# Patient Record
Sex: FEMALE | Race: WHITE | NOT HISPANIC OR LATINO | Employment: FULL TIME | URBAN - METROPOLITAN AREA
[De-identification: names, ages, dates, MRNs, and addresses within clinical notes are randomized per-mention and may not be internally consistent; named-entity substitution may affect disease eponyms.]

---

## 2018-04-13 ENCOUNTER — OFFICE VISIT (OUTPATIENT)
Dept: URGENT CARE | Facility: CLINIC | Age: 38
End: 2018-04-13
Payer: COMMERCIAL

## 2018-04-13 VITALS
BODY MASS INDEX: 21.34 KG/M2 | OXYGEN SATURATION: 97 % | HEIGHT: 64 IN | TEMPERATURE: 98 F | SYSTOLIC BLOOD PRESSURE: 133 MMHG | WEIGHT: 125 LBS | HEART RATE: 77 BPM | RESPIRATION RATE: 18 BRPM | DIASTOLIC BLOOD PRESSURE: 92 MMHG

## 2018-04-13 DIAGNOSIS — R39.15 URINARY URGENCY: Primary | ICD-10-CM

## 2018-04-13 DIAGNOSIS — R35.0 URINARY FREQUENCY: ICD-10-CM

## 2018-04-13 DIAGNOSIS — R30.0 DYSURIA: ICD-10-CM

## 2018-04-13 LAB
B-HCG UR QL: NEGATIVE
BILIRUB UR QL STRIP: NEGATIVE
CTP QC/QA: YES
GLUCOSE UR QL STRIP: NEGATIVE
KETONES UR QL STRIP: NEGATIVE
LEUKOCYTE ESTERASE UR QL STRIP: NEGATIVE
PH, POC UA: 6 (ref 5–8)
POC BLOOD, URINE: NEGATIVE
POC NITRATES, URINE: NEGATIVE
PROT UR QL STRIP: NEGATIVE
SP GR UR STRIP: 1.02 (ref 1–1.03)
UROBILINOGEN UR STRIP-ACNC: NORMAL (ref 0.1–1.1)

## 2018-04-13 PROCEDURE — 99203 OFFICE O/P NEW LOW 30 MIN: CPT | Mod: 25,S$GLB,, | Performed by: NURSE PRACTITIONER

## 2018-04-13 PROCEDURE — 81003 URINALYSIS AUTO W/O SCOPE: CPT | Mod: QW,S$GLB,, | Performed by: NURSE PRACTITIONER

## 2018-04-13 PROCEDURE — 81025 URINE PREGNANCY TEST: CPT | Mod: S$GLB,,, | Performed by: NURSE PRACTITIONER

## 2018-04-13 RX ORDER — LEVONORGESTREL AND ETHINYL ESTRADIOL 90; 20 UG/1; UG/1
TABLET ORAL
Refills: 12 | COMMUNITY
Start: 2018-03-29 | End: 2020-07-20

## 2018-04-13 RX ORDER — PHENAZOPYRIDINE HYDROCHLORIDE 200 MG/1
200 TABLET, FILM COATED ORAL 3 TIMES DAILY PRN
Qty: 6 TABLET | Refills: 0 | Status: SHIPPED | OUTPATIENT
Start: 2018-04-13 | End: 2018-04-15

## 2018-04-13 NOTE — PROGRESS NOTES
"Subjective:       Patient ID: Lauren Schmid is a 37 y.o. female.    Vitals:  height is 5' 4" (1.626 m) and weight is 56.7 kg (125 lb). Her oral temperature is 97.6 °F (36.4 °C). Her blood pressure is 133/92 (abnormal) and her pulse is 77. Her respiration is 18 and oxygen saturation is 97%.     Chief Complaint: Urinary Tract Infection    Patient presents with intermittent dysuria, urinary frequency, and urinary urgency x 6 weeks.  She states two days ago she started taking Cystex with some relief of symptoms.  She states that she gets somewhat recurrent UTIs and at one point in time was given a 90 day supply of Macrobid.  She states that she had a full panel of STD testing a month ago.  She is sexually active with continuous birth control and no condom use with one partner and is not concerned with STDs at this time.  She denies vaginal discharge.        Dysuria    This is a new problem. The current episode started more than 1 month ago. The problem occurs intermittently. The problem has been unchanged. The quality of the pain is described as burning. The patient is experiencing no pain. There has been no fever. The fever has been present for less than 1 day. She is sexually active. There is no history of pyelonephritis. Associated symptoms include frequency and urgency. Pertinent negatives include no behavior changes, chills, discharge, flank pain, hematuria, hesitancy, nausea, possible pregnancy, vomiting, bubble bath use, constipation or rash. Treatments tried: Cystex. The treatment provided moderate relief. Her past medical history is significant for recurrent UTIs. There is no history of catheterization, genitourinary reflux, hypertension, kidney stones, a single kidney, STD or a urological procedure.     Review of Systems   Constitution: Negative for chills, decreased appetite, fever, malaise/fatigue and night sweats.   Skin: Negative for itching, rash and suspicious lesions.   Musculoskeletal: Negative for " back pain.   Gastrointestinal: Negative for abdominal pain, constipation, nausea and vomiting.   Genitourinary: Positive for dysuria, frequency and urgency. Negative for flank pain, genital sores, hematuria, hesitancy, missed menses, non-menstrual bleeding and pelvic pain.       Objective:      Physical Exam   Constitutional: She is oriented to person, place, and time. She appears well-developed and well-nourished.   HENT:   Head: Normocephalic and atraumatic.   Right Ear: External ear normal.   Left Ear: External ear normal.   Nose: Nose normal. No nasal deformity. No epistaxis.   Mouth/Throat: Oropharynx is clear and moist and mucous membranes are normal.   Eyes: Conjunctivae and lids are normal.   Neck: Trachea normal, normal range of motion and phonation normal. Neck supple.   Cardiovascular: Normal rate, regular rhythm, normal heart sounds and normal pulses.    Pulmonary/Chest: Effort normal and breath sounds normal.   Abdominal: Soft. Normal appearance and bowel sounds are normal. She exhibits no distension and no mass. There is no tenderness. There is no rigidity, no rebound, no guarding and no CVA tenderness.   Neurological: She is alert and oriented to person, place, and time.   Skin: Skin is warm, dry and intact.   Psychiatric: She has a normal mood and affect. Her speech is normal and behavior is normal. Cognition and memory are normal.   Nursing note and vitals reviewed.      Results for orders placed or performed in visit on 04/13/18   POCT Urinalysis, Dipstick, Automated, W/O Scope   Result Value Ref Range    POC Blood, Urine Negative Negative    POC Bilirubin, Urine Negative Negative    POC Urobilinogen, Urine normal 0.1 - 1.1    POC Ketones, Urine Negative Negative    POC Protein, Urine Negative Negative    POC Nitrates, Urine Negative Negative    POC Glucose, Urine Negative Negative    pH, UA 6.0 5 - 8    POC Specific Gravity, Urine 1.020 1.003 - 1.029    POC Leukocytes, Urine Negative Negative    POCT urine pregnancy   Result Value Ref Range    POC Preg Test, Ur Negative Negative     Acceptable Yes      Assessment:       1. Urinary urgency    2. Urinary frequency    3. Dysuria        Plan:         Urinary urgency  -     POCT Urinalysis, Dipstick, Automated, W/O Scope  -     POCT urine pregnancy  -     Ambulatory referral to Urology  -     C. trachomatis/N. gonorrhoeae by AMP DNA Urine  -     Urine culture    Urinary frequency  -     Ambulatory referral to Urology  -     C. trachomatis/N. gonorrhoeae by AMP DNA Urine  -     Urine culture    Dysuria  -     Ambulatory referral to Urology  -     phenazopyridine (PYRIDIUM) 200 MG tablet; Take 1 tablet (200 mg total) by mouth 3 (three) times daily as needed for Pain.  Dispense: 6 tablet; Refill: 0  -     C. trachomatis/N. gonorrhoeae by AMP DNA Urine  -     Urine culture      Patient Instructions   Call [phone] to confirm appointment for follow up with urology.  Stop Cystex.  Start Pyridium as needed.  Increase water intake.  Stop use of caffeine, alcohol, or spicy foods as they can irritate your bladder.  We will call you with results of your cultures and call you in an antibiotic at that time if needed.        Dysuria with Uncertain Cause (Adult)    The urethra is the tube that allows urine to pass out of the body. In a woman, the urethra is the opening above the vagina. In men, the urethra is the opening on the tip of the penis. Dysuria is the feeling of pain or burning in the urethra when passing urine.  Dysuria can be caused by anything that irritates or inflames the urethra. An infection or chemical irritation can cause this reaction. A bladder infection is the most common cause of dysuria in adults. A urine test can diagnose this. A bladder infection needs antibiotic treatment.  Soaps, lotions, colognes and feminine hygiene products can cause dysuria. So can birth control jellies, creams, and foams. It will go away 1 to 3 days after using  these irritants.  Sexually transmitted diseases (STDs) such as chlamydia or gonorrhea can cause dysuria. Your healthcare provider may take a culture sample. Your provider may start you on antibiotic medicine before the culture test returns.  In women who have gone through menopause, dysuria can be from dryness in the lining of the urethra. This can be treated with hormones. Dysuria becomes long-term (chronic) when it lasts for weeks or months. You may need to see a specialist (urologist) to diagnose and treat chronic dysuria.  Home care  These home care tips may help:  · Don't use any chemicals or products that you think may be causing your symptoms.  · If you were given a prescription medicine, take as directed. Be sure to take it until it is all used up.  · If a culture was taken, don't have sex until you have been told that it is negative. This means you don't have an infection. Then follow your healthcare provider's advice to treat your condition.  If a culture was done and it is positive:  · Both you and your sexual partner may need to be treated. This is true even if your partner has no symptoms.  · Contact your healthcare provider or go to an urgent care clinic or the public health department to be looked at and treated.  · Don't have sex until both you and your partner(s) have finished all antibiotics and your healthcare provider says you are no longer contagious.  · Learn about and use safe sex practices. The safest sex is with a partner who has tested negative and only has sex with you. Condoms can prevent STDs from spreading, but they aren't a guarantee.  Follow-up care  Follow up with your healthcare provider, or as advised. If a culture was taken, you may call as directed for the results. If you have an STD, follow up with your provider or the public health department for a complete STD screening, including HIV testing. For more information, contact CDC-INFO at 976-099-7471.  When to seek medical  advice  Call your healthcare provider right away if any of these occur:  · You aren't better after 3 days of treatment  · Fever of 100.4ºF (38ºC) or higher, or as directed by your healthcare provider  · Back or belly pain that gets worse  · You can't urinate because of pain  · New discharge from the urethra, vagina, or penis  · Painful sores on the penis  · Rash or joint pain  · Painful lumps (lymph nodes) in the groin  · Testicle pain or swelling of the scrotum  Date Last Reviewed: 11/1/2016 © 2000-2017 [employer]. [address] [place], [place] [address]. All rights reserved. This information is not intended as a substitute for professional medical care. Always follow your healthcare professional's instructions.

## 2018-04-13 NOTE — PATIENT INSTRUCTIONS
Call [phone] to confirm appointment for follow up with urology.  Stop Cystex.  Start Pyridium as needed.  Increase water intake.  Stop use of caffeine, alcohol, or spicy foods as they can irritate your bladder.  We will call you with results of your cultures and call you in an antibiotic at that time if needed.        Dysuria with Uncertain Cause (Adult)    The urethra is the tube that allows urine to pass out of the body. In a woman, the urethra is the opening above the vagina. In men, the urethra is the opening on the tip of the penis. Dysuria is the feeling of pain or burning in the urethra when passing urine.  Dysuria can be caused by anything that irritates or inflames the urethra. An infection or chemical irritation can cause this reaction. A bladder infection is the most common cause of dysuria in adults. A urine test can diagnose this. A bladder infection needs antibiotic treatment.  Soaps, lotions, colognes and feminine hygiene products can cause dysuria. So can birth control jellies, creams, and foams. It will go away 1 to 3 days after using these irritants.  Sexually transmitted diseases (STDs) such as chlamydia or gonorrhea can cause dysuria. Your healthcare provider may take a culture sample. Your provider may start you on antibiotic medicine before the culture test returns.  In women who have gone through menopause, dysuria can be from dryness in the lining of the urethra. This can be treated with hormones. Dysuria becomes long-term (chronic) when it lasts for weeks or months. You may need to see a specialist (urologist) to diagnose and treat chronic dysuria.  Home care  These home care tips may help:  · Don't use any chemicals or products that you think may be causing your symptoms.  · If you were given a prescription medicine, take as directed. Be sure to take it until it is all used up.  · If a culture was taken, don't have sex until you have been told that it is negative. This means you don't have  an infection. Then follow your healthcare provider's advice to treat your condition.  If a culture was done and it is positive:  · Both you and your sexual partner may need to be treated. This is true even if your partner has no symptoms.  · Contact your healthcare provider or go to an urgent care clinic or the public health department to be looked at and treated.  · Don't have sex until both you and your partner(s) have finished all antibiotics and your healthcare provider says you are no longer contagious.  · Learn about and use safe sex practices. The safest sex is with a partner who has tested negative and only has sex with you. Condoms can prevent STDs from spreading, but they aren't a guarantee.  Follow-up care  Follow up with your healthcare provider, or as advised. If a culture was taken, you may call as directed for the results. If you have an STD, follow up with your provider or the public health department for a complete STD screening, including HIV testing. For more information, contact CDC-INFO at 872-459-8246.  When to seek medical advice  Call your healthcare provider right away if any of these occur:  · You aren't better after 3 days of treatment  · Fever of 100.4ºF (38ºC) or higher, or as directed by your healthcare provider  · Back or belly pain that gets worse  · You can't urinate because of pain  · New discharge from the urethra, vagina, or penis  · Painful sores on the penis  · Rash or joint pain  · Painful lumps (lymph nodes) in the groin  · Testicle pain or swelling of the scrotum  Date Last Reviewed: 11/1/2016  © 6359-5620 [employer]. [address] [place] [address], [place] [address]. All rights reserved. This information is not intended as a substitute for professional medical care. Always follow your healthcare professional's instructions.

## 2018-04-17 LAB
BACTERIA UR CULT: NORMAL
BACTERIA UR CULT: NORMAL

## 2018-04-18 ENCOUNTER — TELEPHONE (OUTPATIENT)
Dept: INTERNAL MEDICINE | Facility: CLINIC | Age: 38
End: 2018-04-18

## 2018-04-18 ENCOUNTER — TELEPHONE (OUTPATIENT)
Dept: URGENT CARE | Facility: CLINIC | Age: 38
End: 2018-04-18

## 2018-04-18 LAB
C TRACH RRNA SPEC QL NAA+PROBE: NEGATIVE
N GONORRHOEA RRNA SPEC QL NAA+PROBE: NEGATIVE

## 2018-04-18 NOTE — TELEPHONE ENCOUNTER
----- Message from Jenn Powell MD sent at 4/18/2018  9:30 AM CDT -----  Notify patient that all lab results are normal. No STD. Followup with primary care doctor as needed

## 2018-04-23 ENCOUNTER — OFFICE VISIT (OUTPATIENT)
Dept: UROLOGY | Facility: CLINIC | Age: 38
End: 2018-04-23
Payer: COMMERCIAL

## 2018-04-23 VITALS
DIASTOLIC BLOOD PRESSURE: 86 MMHG | WEIGHT: 119.69 LBS | BODY MASS INDEX: 20.43 KG/M2 | HEIGHT: 64 IN | HEART RATE: 92 BPM | SYSTOLIC BLOOD PRESSURE: 128 MMHG

## 2018-04-23 DIAGNOSIS — R30.0 DYSURIA: Primary | ICD-10-CM

## 2018-04-23 PROCEDURE — 99203 OFFICE O/P NEW LOW 30 MIN: CPT | Mod: S$GLB,,, | Performed by: UROLOGY

## 2018-04-23 PROCEDURE — 99999 PR PBB SHADOW E&M-EST. PATIENT-LVL III: CPT | Mod: PBBFAC,,, | Performed by: UROLOGY

## 2018-04-23 NOTE — PROGRESS NOTES
Subjective:       Patient ID: Lauren Schmid is a 37 y.o. female.    Chief Complaint: new patient (c/o burning with urination . she feel pressure like she have to urinate all the times for the pass 6weeks.)    JASONnt is here with several week history of dysuria.  She's had negative cultures done.  She's had UTIs in the past.  She was on antibiotics 1 time in the past.  No history of fever chills nausea vomiting flank pain history of stones.  She's never had a workup.  Her urinalysis is clear    History reviewed. No pertinent past medical history.    History reviewed. No pertinent surgical history.    History reviewed. No pertinent family history.    Social History     Social History    Marital status:      Spouse name: N/A    Number of children: N/A    Years of education: N/A     Occupational History    Not on file.     Social History Main Topics    Smoking status: Never Smoker    Smokeless tobacco: Never Used    Alcohol use Yes    Drug use: No    Sexual activity: Yes     Partners: Male     Other Topics Concern    Not on file     Social History Narrative    No narrative on file       Allergies:  Codeine    Medications:    Current Outpatient Prescriptions:     ROSALES 90-20 mcg per tablet, TK 1 T PO QD, Disp: , Rfl: 12    Review of Systems   Constitutional: Negative.    HENT: Negative.    Eyes: Negative.    Respiratory: Negative.    Endocrine: Negative.    Genitourinary: Negative.    Musculoskeletal: Negative.    Allergic/Immunologic: Negative.    Neurological: Negative.    Hematological: Negative.    Psychiatric/Behavioral: Negative.        Objective:      Physical Exam   Constitutional: She appears well-developed.   HENT:   Head: Normocephalic.   Cardiovascular: Normal rate.    Pulmonary/Chest: Effort normal.   Abdominal: Soft.   Musculoskeletal: Normal range of motion.   Neurological: She is alert.   Skin: Skin is warm.         Assessment:       1. Dysuria        Plan:       Lauren was seen  today for new patient.    Diagnoses and all orders for this visit:    Dysuria  -     US Retroperitoneal Complete (Kidney and; Future  -     Cystoscopy; Future     patient will take probiotics and drink plenty of fluids.  She will continue taking Cystex.  This is helping somewhat

## 2018-04-23 NOTE — LETTER
April 23, 2018      Sharon Morrow, NP  [address] [place] [address]           [facility] - Urology 4th Floor  [address]  [place] [address]  Phone: [phone]          Patient: Lauren Schmid   MR Number: [id number]   YOB: 1980   Date of Visit: 4/23/2018       Dear Sharon Morrow:    Thank you for referring Lauren Schmid to me for evaluation. Attached you will find relevant portions of my assessment and plan of care.    If you have questions, please do not hesitate to call me. I look forward to following Lauren Schmid along with you.    Sincerely,    Sung Salazar Jr., MD    Enclosure  CC:  No Recipients    If you would like to receive this communication electronically, please contact [email or web address] or [phone] to request more information on [employer] Link access.    For providers and/or their staff who would like to refer a patient to [facility], please contact us through our one-stop-shop provider referral line, [facility], at [phone].    If you feel you have received this communication in error or would no longer like to receive these types of communications, please e-mail [email or web address]

## 2018-06-10 RX ORDER — CIPROFLOXACIN 500 MG/1
500 TABLET ORAL ONCE
Status: CANCELLED | OUTPATIENT
Start: 2018-06-10 | End: 2018-06-10

## 2018-06-11 ENCOUNTER — HOSPITAL ENCOUNTER (OUTPATIENT)
Dept: UROLOGY | Facility: HOSPITAL | Age: 38
Discharge: HOME | End: 2018-06-11
Attending: UROLOGY
Payer: COMMERCIAL

## 2018-06-11 ENCOUNTER — HOSPITAL ENCOUNTER (OUTPATIENT)
Dept: RADIOLOGY | Facility: HOSPITAL | Age: 38
Discharge: HOME | End: 2018-06-11
Attending: UROLOGY
Payer: COMMERCIAL

## 2018-06-11 VITALS
WEIGHT: 120.56 LBS | HEIGHT: 64 IN | RESPIRATION RATE: 18 BRPM | HEART RATE: 85 BPM | BODY MASS INDEX: 20.58 KG/M2 | DIASTOLIC BLOOD PRESSURE: 92 MMHG | SYSTOLIC BLOOD PRESSURE: 145 MMHG | TEMPERATURE: 99 F

## 2018-06-11 DIAGNOSIS — R30.0 DYSURIA: ICD-10-CM

## 2018-06-11 PROCEDURE — 52000 CYSTOURETHROSCOPY: CPT

## 2018-06-11 PROCEDURE — 52000 CYSTOURETHROSCOPY: CPT | Mod: ,,, | Performed by: UROLOGY

## 2018-06-11 PROCEDURE — 76770 US EXAM ABDO BACK WALL COMP: CPT | Mod: 26,,, | Performed by: RADIOLOGY

## 2018-06-11 PROCEDURE — 76770 US EXAM ABDO BACK WALL COMP: CPT | Mod: TC

## 2018-06-11 RX ORDER — LIDOCAINE HYDROCHLORIDE 20 MG/ML
JELLY TOPICAL ONCE
Status: COMPLETED | OUTPATIENT
Start: 2018-06-11 | End: 2018-06-11

## 2018-06-11 RX ADMIN — LIDOCAINE HYDROCHLORIDE: 20 JELLY TOPICAL at 01:06

## 2018-06-11 NOTE — PATIENT INSTRUCTIONS
What to Expect After a Cystoscopy  For the next 24-48 hours, you may feel a mild burning when you urinate. This burning is normal and expected. Drink plenty of water to dilute the urine to help relieve the burning sensation. You may also see a small amount of blood in your urine after the procedure.    Unless you are already taking antibiotics, you may be given an antibiotic after the test to prevent infection.    Signs and Symptoms to Report  Call the [facility] at [phone] if you develop any of the following:  · Fever of 101 degrees or higher  · Chills or persistent bleeding  · Inability to urinate

## 2018-06-11 NOTE — OP NOTE
DATE OF PROCEDURE:  06/11/2018.    PREOPERATIVE DIAGNOSIS:  Dysuria.    POSTOPERATIVE DIAGNOSIS:  Chronic cystitis.    OPERATION PERFORMED:  Flexible cystoscopy.    PROCEDURE IN DETAIL:  The patient was having lower tract irritative symptoms   with urgency.  Her urine cultures were negative.  She was prepped and draped in   dorsal lithotomy position.  Xylocaine jelly was instilled per urethra.  Urethra   was normal without evidence of diverticula.  Bladder neck was normal.  Both the   orifices were normal in size, shape, and position with clear efflux.  There were   several reddish areas consistent with cystitis cystica.  There were no stones,   tumors, foreign bodies, or active infections noted.  There was clear efflux from   each ureteral orifice.    IMPRESSION:  Cystitis cystica.    RECOMMENDATIONS:  Bactrim DS one p.o. b.i.d. x30-60 days, and the patient will   return to clinic in six weeks for urinalysis.  I will add oxybutynin p.r.n.      KESHIA  dd: 06/11/2018 14:08:34 (CDT)  td: 06/11/2018 15:40:10 (CDT)  Doc ID   #[id number]  Job ID #[id number]    CC:

## 2018-06-25 ENCOUNTER — TELEPHONE (OUTPATIENT)
Dept: UROLOGY | Facility: CLINIC | Age: 38
End: 2018-06-25

## 2018-06-25 RX ORDER — SULFAMETHOXAZOLE AND TRIMETHOPRIM 800; 160 MG/1; MG/1
1 TABLET ORAL 2 TIMES DAILY
Qty: 60 TABLET | Refills: 2 | Status: SHIPPED | OUTPATIENT
Start: 2018-06-25 | End: 2018-07-25

## 2018-06-25 NOTE — TELEPHONE ENCOUNTER
----- Message from Michaela Moses LPN sent at 6/22/2018  3:56 PM CDT -----  Patient had cysto-I do not see that a urine culture was done   ----- Message -----  From: Rosalina Arrieta  Sent: 6/22/2018   3:17 PM  To: Marie ANDERSON Jr Staff    Needs Advice    Reason for call:  Pt states she was told that an antibiotic was being sent to her rx and rx has not received it. Pt states she was seen on 06/11/18    Communication Preference: [phone]  Additional Information:  [facility] [address] [place] [address]   [address] [facility]   [phone] (Phone)  [phone] (Fax)

## 2020-02-08 ENCOUNTER — OFFICE VISIT (OUTPATIENT)
Dept: OBSTETRICS AND GYNECOLOGY | Facility: CLINIC | Age: 40
End: 2020-02-08
Payer: COMMERCIAL

## 2020-02-08 VITALS
HEIGHT: 64 IN | DIASTOLIC BLOOD PRESSURE: 80 MMHG | SYSTOLIC BLOOD PRESSURE: 120 MMHG | BODY MASS INDEX: 23.66 KG/M2 | WEIGHT: 138.56 LBS

## 2020-02-08 DIAGNOSIS — Z30.011 ENCOUNTER FOR INITIAL PRESCRIPTION OF CONTRACEPTIVE PILLS: Primary | ICD-10-CM

## 2020-02-08 DIAGNOSIS — N93.9 VAGINAL SPOTTING: ICD-10-CM

## 2020-02-08 PROCEDURE — 99999 PR PBB SHADOW E&M-EST. PATIENT-LVL III: ICD-10-PCS | Mod: PBBFAC,,,

## 2020-02-08 PROCEDURE — 87481 CANDIDA DNA AMP PROBE: CPT | Mod: 59

## 2020-02-08 PROCEDURE — 99999 PR PBB SHADOW E&M-EST. PATIENT-LVL III: CPT | Mod: PBBFAC,,,

## 2020-02-08 PROCEDURE — 99204 OFFICE O/P NEW MOD 45 MIN: CPT | Mod: S$GLB,,, | Performed by: OBSTETRICS & GYNECOLOGY

## 2020-02-08 PROCEDURE — 3008F PR BODY MASS INDEX (BMI) DOCUMENTED: ICD-10-PCS | Mod: CPTII,S$GLB,, | Performed by: OBSTETRICS & GYNECOLOGY

## 2020-02-08 PROCEDURE — 99204 PR OFFICE/OUTPT VISIT, NEW, LEVL IV, 45-59 MIN: ICD-10-PCS | Mod: S$GLB,,, | Performed by: OBSTETRICS & GYNECOLOGY

## 2020-02-08 PROCEDURE — 87491 CHLMYD TRACH DNA AMP PROBE: CPT

## 2020-02-08 PROCEDURE — 3008F BODY MASS INDEX DOCD: CPT | Mod: CPTII,S$GLB,, | Performed by: OBSTETRICS & GYNECOLOGY

## 2020-02-08 RX ORDER — LEVONORGESTREL AND ETHINYL ESTRADIOL 90; 20 UG/1; UG/1
1 TABLET ORAL DAILY
Qty: 90 TABLET | Refills: 3 | Status: SHIPPED | OUTPATIENT
Start: 2020-02-08 | End: 2020-07-20

## 2020-02-08 NOTE — PROGRESS NOTES
HISTORY OF PRESENT ILLNESS:    Lauren Schmid is a 39 y.o. female, No obstetric history on file., No LMP recorded. (Menstrual status: Birth Control)., presents for an annual exam and ti initiate care with OB/GYN at [facility]. Pt reports normal Pap in 2019 thru [facility]. Pt is on OCPs and takes them continuously thru the month so as not to have a cycle. Has been taking them for approx 10 years with no problems. Pt reports new onset of spotting and mild cramping x 2 weeks. Spotting is light and does not require her to wear a pad or tampons. Pt desires to continue on OCPs.     History reviewed. No pertinent past medical history.    History reviewed. No pertinent surgical history.    MEDICATIONS AND ALLERGIES:      Current Outpatient Medications:     ROSALES 90-20 mcg per tablet, TK 1 T PO QD, Disp: , Rfl: 12    levonorgestrel-ethinyl estrad (LYBREL) 90-20 mcg (28) per tablet, Take 1 tablet by mouth once daily., Disp: 90 tablet, Rfl: 3    Review of patient's allergies indicates:   Allergen Reactions    Codeine Other (See Comments)     vomitting       History reviewed. No pertinent family history.    Social History     Socioeconomic History    Marital status:      Spouse name: Not on file    Number of children: Not on file    Years of education: Not on file    Highest education level: Not on file   Occupational History    Not on file   Social Needs    Financial resource strain: Not on file    Food insecurity:     Worry: Not on file     Inability: Not on file    Transportation needs:     Medical: Not on file     Non-medical: Not on file   Tobacco Use    Smoking status: Never Smoker    Smokeless tobacco: Never Used   Substance and Sexual Activity    Alcohol use: Yes    Drug use: No    Sexual activity: Yes     Partners: Male   Lifestyle    Physical activity:     Days per week: Not on file     Minutes per session: Not on file    Stress: Not on file   Relationships    Social connections:     " Talks on phone: Not on file     Gets together: Not on file     Attends [employer] witness service: Not on file     Active member of club or organization: Not on file     Attends meetings of clubs or organizations: Not on file     Relationship status: Not on file   Other Topics Concern    Not on file   Social History Narrative    Not on file       COMPREHENSIVE GYN HISTORY:  PAP History: Denies abnormal Paps.  Infection History: Denies STDs. Denies PID.  Benign History: Denies uterine fibroids. Denies ovarian cysts. Denies endometriosis. Denies other conditions.  Cancer History: Denies cervical cancer. Denies uterine cancer or hyperplasia. Denies ovarian cancer. Denies vulvar cancer or pre-cancer. Denies vaginal cancer or pre-cancer. Denies breast cancer. Denies colon cancer.  Sexual Activity History:  currently  sexually active  Menstrual History: No cycles sec to OCPs  Dysmenorrhea History:None  Contraception:OCPs    ROS:  GENERAL: No weight changes. No swelling. No fatigue. No fever.  CARDIOVASCULAR: No chest pain. No shortness of breath. No leg cramps.   NEUROLOGICAL: No headaches. No vision changes.  BREASTS: No pain. No lumps. No discharge.  ABDOMEN: No pain. No nausea. No vomiting. No diarrhea. No constipation.  REPRODUCTIVE: No abnormal bleeding.   VULVA: No pain. No lesions. No itching.  VAGINA: No relaxation. No itching. No odor. No discharge. No lesions.Reports spotting cramping x 2 weeks  URINARY: No incontinence. No nocturia. No frequency. No dysuria.    /80   Ht 5' 4" (1.626 m)   Wt 62.9 kg (138 lb 9 oz)   BMI 23.78 kg/m²     PE:  APPEARANCE: Well nourished, well developed, in no acute distress.  AFFECT: WNL, alert and oriented x 3. Interactive during exam  SKIN: No acne or hirsutism.  NECK: Neck symmetric, without masses or thyromegaly.  NODES: No i femoral lymph node enlargement.  CHEST: Good respiratory effort.   BREASTS: Symmetrical, no skin changes, visible lesions, palpable masses or nipple " discharge bilaterally.  PELVIC: External female genitalia without lesions.  Female hair distribution. Adequate perineal body, Normal urethral meatus. Vagina moist and well rugated without lesions or discharge.  No significant cystocele or rectocele present. Cervix pink without lesions, discharge or tenderness. Uterus is 4-6 week size, regular, mobile and nontender. Adnexa without masses or tenderness.  EXTREMITIES: No edema    PROCEDURES:      DIAGNOSIS:  1. Gyn exam    LABS AND TESTS ORDERED: DNA probe for GC/chlam/ Affirm    MEDICATIONS PRESCRIBED:Ikm OCPs    COUNSELING:  The patient was counseled today on: possible etiology of spotting and OCPs. Discussed monitor x 2 week and if no resolution will consider changing brands.Pt agrees.    -A.C.S. Pap and pelvic exam guidelines, , monthly self breast exams and to follow up with her PCP for other health maintenance.    FOLLOW-UP with me annually.

## 2020-02-09 LAB
C TRACH DNA SPEC QL NAA+PROBE: NOT DETECTED
N GONORRHOEA DNA SPEC QL NAA+PROBE: NOT DETECTED

## 2020-02-10 LAB
BACTERIAL VAGINOSIS DNA: NEGATIVE
CANDIDA GLABRATA DNA: NEGATIVE
CANDIDA KRUSEI DNA: NEGATIVE
CANDIDA RRNA VAG QL PROBE: POSITIVE
T VAGINALIS RRNA GENITAL QL PROBE: NEGATIVE

## 2020-02-12 ENCOUNTER — PATIENT MESSAGE (OUTPATIENT)
Dept: OBSTETRICS AND GYNECOLOGY | Facility: CLINIC | Age: 40
End: 2020-02-12

## 2020-02-12 DIAGNOSIS — B37.9 CANDIDA ALBICANS INFECTION: Primary | ICD-10-CM

## 2020-02-12 RX ORDER — FLUCONAZOLE 200 MG/1
200 TABLET ORAL EVERY OTHER DAY
Qty: 2 TABLET | Refills: 0 | Status: SHIPPED | OUTPATIENT
Start: 2020-02-12 | End: 2020-02-15

## 2020-03-02 ENCOUNTER — PATIENT MESSAGE (OUTPATIENT)
Dept: OBSTETRICS AND GYNECOLOGY | Facility: CLINIC | Age: 40
End: 2020-03-02

## 2020-03-09 ENCOUNTER — PATIENT MESSAGE (OUTPATIENT)
Dept: OBSTETRICS AND GYNECOLOGY | Facility: CLINIC | Age: 40
End: 2020-03-09

## 2020-03-10 ENCOUNTER — PATIENT MESSAGE (OUTPATIENT)
Dept: OBSTETRICS AND GYNECOLOGY | Facility: CLINIC | Age: 40
End: 2020-03-10

## 2020-07-14 ENCOUNTER — OFFICE VISIT (OUTPATIENT)
Dept: OBSTETRICS AND GYNECOLOGY | Facility: CLINIC | Age: 40
End: 2020-07-14
Payer: COMMERCIAL

## 2020-07-14 VITALS
DIASTOLIC BLOOD PRESSURE: 78 MMHG | SYSTOLIC BLOOD PRESSURE: 110 MMHG | WEIGHT: 142.63 LBS | HEIGHT: 64 IN | BODY MASS INDEX: 24.35 KG/M2

## 2020-07-14 DIAGNOSIS — R10.2 PELVIC PAIN: Primary | ICD-10-CM

## 2020-07-14 PROCEDURE — 99213 PR OFFICE/OUTPT VISIT, EST, LEVL III, 20-29 MIN: ICD-10-PCS | Mod: S$GLB,,, | Performed by: NURSE PRACTITIONER

## 2020-07-14 PROCEDURE — 99999 PR PBB SHADOW E&M-EST. PATIENT-LVL III: CPT | Mod: PBBFAC,,, | Performed by: NURSE PRACTITIONER

## 2020-07-14 PROCEDURE — 99999 PR PBB SHADOW E&M-EST. PATIENT-LVL III: ICD-10-PCS | Mod: PBBFAC,,, | Performed by: NURSE PRACTITIONER

## 2020-07-14 PROCEDURE — 3008F BODY MASS INDEX DOCD: CPT | Mod: CPTII,S$GLB,, | Performed by: NURSE PRACTITIONER

## 2020-07-14 PROCEDURE — 99213 OFFICE O/P EST LOW 20 MIN: CPT | Mod: S$GLB,,, | Performed by: NURSE PRACTITIONER

## 2020-07-14 PROCEDURE — 3008F PR BODY MASS INDEX (BMI) DOCUMENTED: ICD-10-PCS | Mod: CPTII,S$GLB,, | Performed by: NURSE PRACTITIONER

## 2020-07-14 NOTE — PROGRESS NOTES
CC: pain after orgasm    HPI: Pt is a 39 y.o.  female who presents for pain after orgasm. New patient to me-last seen by Marimar Duran in [facility] in February. Takes continuous OCPs to avoid cycles-occ BTB x 1-2 days q few months. No new partners-pain started about a year ago. Feels like period cramps-always comes after orgasm. Always spontaneously resolves after 10 minutes or so. Questioning if this is normal or a concern. Denies hx of ovarian cysts or pelvic surgery. Never been pregnant.     ROS:  GENERAL: Feeling well overall. Denies fever or chills.   SKIN: Denies rash or lesions.   HEAD: Denies head injury or headache.   NODES: Denies enlarged lymph nodes.   CHEST: Denies chest pain or shortness of breath.   CARDIOVASCULAR: Denies palpitations or left sided chest pain.   ABDOMEN: No abdominal pain, constipation, diarrhea, nausea, vomiting or rectal bleeding.   URINARY: No dysuria, hematuria, or burning on urination.  REPRODUCTIVE: See HPI.   BREASTS: Denies pain, lumps, or nipple discharge.   HEMATOLOGIC: No easy bruisability or excessive bleeding.   MUSCULOSKELETAL: Denies joint pain or swelling.   NEUROLOGIC: Denies syncope or weakness.   PSYCHIATRIC: Denies depression, anxiety or mood swings.    PE:   APPEARANCE: Well nourished, well developed, White female in no acute distress.  PELVIC: Deferred-talk only    Diagnosis:  1. Pelvic pain        Plan:     Orders Placed This Encounter    US Pelvis Comp with Transvag NON-OB (xpd     1. Pelvic US to r/o anatomical cause of pain-if normal d/w pt trying NSAIDs prn   2. C/w OCPs-d/w pt BTB and option of allowing a cycle q 3 months to decrease frequency of BTB    Total duration face to face visit time 15 minutes.     Follow-up pending US results

## 2020-07-17 ENCOUNTER — HOSPITAL ENCOUNTER (OUTPATIENT)
Dept: RADIOLOGY | Facility: OTHER | Age: 40
Discharge: HOME | End: 2020-07-17
Attending: NURSE PRACTITIONER
Payer: COMMERCIAL

## 2020-07-17 DIAGNOSIS — R10.2 PELVIC PAIN: ICD-10-CM

## 2020-07-17 PROCEDURE — 76856 US PELVIS COMP WITH TRANSVAG NON-OB (XPD): ICD-10-PCS | Mod: 26,,, | Performed by: RADIOLOGY

## 2020-07-17 PROCEDURE — 76830 TRANSVAGINAL US NON-OB: CPT | Mod: 26,,, | Performed by: RADIOLOGY

## 2020-07-17 PROCEDURE — 76830 US PELVIS COMP WITH TRANSVAG NON-OB (XPD): ICD-10-PCS | Mod: 26,,, | Performed by: RADIOLOGY

## 2020-07-17 PROCEDURE — 76830 TRANSVAGINAL US NON-OB: CPT | Mod: TC

## 2020-07-17 PROCEDURE — 76856 US EXAM PELVIC COMPLETE: CPT | Mod: 26,,, | Performed by: RADIOLOGY

## 2020-07-20 RX ORDER — LEVONORGESTREL / ETHINYL ESTRADIOL AND ETHINYL ESTRADIOL 150-30(84)
1 KIT ORAL DAILY
Qty: 91 EACH | Refills: 3 | Status: SHIPPED | OUTPATIENT
Start: 2020-07-20 | End: 2021-06-04 | Stop reason: SDUPTHER

## 2020-07-30 ENCOUNTER — TELEPHONE (OUTPATIENT)
Dept: OBSTETRICS AND GYNECOLOGY | Facility: CLINIC | Age: 40
End: 2020-07-30

## 2020-08-03 ENCOUNTER — OFFICE VISIT (OUTPATIENT)
Dept: OBSTETRICS AND GYNECOLOGY | Facility: CLINIC | Age: 40
End: 2020-08-03
Payer: COMMERCIAL

## 2020-08-03 VITALS
SYSTOLIC BLOOD PRESSURE: 122 MMHG | HEIGHT: 64 IN | DIASTOLIC BLOOD PRESSURE: 80 MMHG | WEIGHT: 143.75 LBS | BODY MASS INDEX: 24.54 KG/M2

## 2020-08-03 DIAGNOSIS — R10.2 VAGINAL PAIN: ICD-10-CM

## 2020-08-03 DIAGNOSIS — R10.2 PELVIC PAIN IN FEMALE: Primary | ICD-10-CM

## 2020-08-03 PROCEDURE — 99243 OFF/OP CNSLTJ NEW/EST LOW 30: CPT | Mod: S$GLB,,, | Performed by: OBSTETRICS & GYNECOLOGY

## 2020-08-03 PROCEDURE — 99243 PR OFFICE CONSULTATION,LEVEL III: ICD-10-PCS | Mod: S$GLB,,, | Performed by: OBSTETRICS & GYNECOLOGY

## 2020-08-03 PROCEDURE — 99999 PR PBB SHADOW E&M-EST. PATIENT-LVL III: CPT | Mod: PBBFAC,,, | Performed by: OBSTETRICS & GYNECOLOGY

## 2020-08-03 PROCEDURE — 99999 PR PBB SHADOW E&M-EST. PATIENT-LVL III: ICD-10-PCS | Mod: PBBFAC,,, | Performed by: OBSTETRICS & GYNECOLOGY

## 2020-08-03 RX ORDER — AMITRIPTYLINE HYDROCHLORIDE 10 MG/1
10 TABLET, FILM COATED ORAL NIGHTLY PRN
Qty: 30 TABLET | Refills: 5 | Status: SHIPPED | OUTPATIENT
Start: 2020-08-03 | End: 2020-09-21

## 2020-08-03 NOTE — PROGRESS NOTES
Subjective:       Patient ID: Lauren Schmid is a 39 y.o. female.    Chief Complaint:  Endometriosis - pevic pain (NP Jeffery - very bad cycles entire life - ), Dysmenorrhea (following intercourse and urination or BM), and Bloated (started Thursday)      History of Present Illness  Pt is 39 y.o. sent in consultation from MICHELLE Gil regarding pain after orgasms.  Pt has been on Kim OCP's for > 10 years and did well.  But recently started to have some spotting for a couple of days.  Had discussions about switching pills.    But over the last year, she has been having pain and cramps after an orgasm.  It only lasts 5 minutes.  Then has some slight spotting.  Now starting to have cramps after BM / urination.  Those episodes (not consistent) will last for 10 mintues.  + associated bloating.    No trauma/accidents.  Has been with the same partner.  No heavy lifting.  Has been doing yoga since 4/2019.  No blood in urine / stool        Pelvic Pain  The patient's primary symptoms include pelvic pain. The patient's pertinent negatives include no vaginal discharge. This is a recurrent problem. The current episode started more than 1 month ago. The problem occurs daily. The problem has been gradually worsening. The pain is moderate. She is not pregnant. Associated symptoms include abdominal pain, back pain, constipation, headaches and painful intercourse. Pertinent negatives include no anorexia, chills, diarrhea, discolored urine, dysuria, fever, flank pain, frequency, hematuria, nausea, rash, urgency or vomiting. The symptoms are aggravated by bowel movements, intercourse and urinating. She has tried heating pad and NSAIDs for the symptoms. The treatment provided no relief. She is sexually active. Yes, her partner has an STD. She uses oral contraceptives for contraception. Her past medical history is significant for herpes simplex, menorrhagia, metrorrhagia, an STD and vaginosis. There is no history of an abdominal surgery, a   section, an ectopic pregnancy, endometriosis, a gynecological surgery, miscarriage, ovarian cysts, perineal abscess, PID or a terminated pregnancy.         GYN & OB History  No LMP recorded (exact date). (Menstrual status: Birth Control).   Date of Last Pap: No result found    OB History    Para Term  AB Living   0 0 0 0 0 0   SAB TAB Ectopic Multiple Live Births   0 0 0 0 0       Review of Systems  Review of Systems   Constitutional: Negative for activity change, appetite change, chills, fatigue and fever.   HENT: Negative.  Negative for tinnitus.    Eyes: Negative.    Respiratory: Negative for cough and shortness of breath.    Cardiovascular: Negative for chest pain and palpitations.   Gastrointestinal: Positive for abdominal pain and constipation. Negative for anorexia, blood in stool, diarrhea, nausea and vomiting.   Endocrine: Negative.  Negative for hot flashes.   Genitourinary: Positive for menorrhagia and pelvic pain. Negative for dysmenorrhea, dyspareunia, dysuria, flank pain, frequency, hematuria, menstrual problem, urgency, vaginal discharge, urinary incontinence and postcoital bleeding.   Musculoskeletal: Positive for back pain. Negative for joint swelling.   Integumentary:  Negative for rash, breast mass, nipple discharge and breast skin changes.   Neurological: Positive for headaches.   Hematological: Negative.  Does not bruise/bleed easily.   Psychiatric/Behavioral: The patient is not nervous/anxious.    Breast: negative.  Negative for mass, nipple discharge and skin changes          Objective:    Physical Exam:   Constitutional: She is oriented to person, place, and time. She appears well-developed and well-nourished. No distress.    HENT:   Head: Normocephalic and atraumatic.    Eyes: Pupils are equal, round, and reactive to light. Conjunctivae and lids are normal.    Neck: Normal range of motion and full passive range of motion without pain. Neck supple.    Cardiovascular:  Normal rate and regular rhythm.  Exam reveals no edema.     Pulmonary/Chest: Effort normal and breath sounds normal. She has no wheezes.        Abdominal: Soft. Normal appearance and bowel sounds are normal. She exhibits no distension. There is no abdominal tenderness. There is no rebound and no guarding.     Genitourinary:    Vagina and uterus normal.      Pelvic exam was performed with patient supine.   There is no tenderness or lesion on the right labia. There is no tenderness or lesion on the left labia. Uterus is not deviated, not fixed and not hosting fibroids. Cervix is normal. Right adnexum displays no mass and no tenderness. Left adnexum displays no mass and no tenderness. No rectocele, cystocele or unspecified prolapse of vaginal walls in the vagina. Labial bartholins normal.Cervix exhibits no motion tenderness and no discharge.    Genitourinary Comments: No point tenderness vaginally.  Pelvic floor with normal tension  No CMT     negative for vaginal discharge          Musculoskeletal: Normal range of motion and moves all extremeties.       Neurological: She is alert and oriented to person, place, and time. She has normal strength.    Skin: Skin is warm and dry.    Psychiatric: She has a normal mood and affect. Her speech is normal and behavior is normal. Thought content normal. Her mood appears not anxious. She does not exhibit a depressed mood. She expresses no suicidal plans and no homicidal plans.          Assessment:        1. Pelvic pain in female    2. Vaginal pain                Plan:      Lauren was seen today for endometriosis - pevic pain, dysmenorrhea and bloated.    Diagnoses and all orders for this visit:    Pelvic pain in female  -     amitriptyline (ELAVIL) 10 MG tablet; Take 1 tablet (10 mg total) by mouth nightly as needed for Insomnia.  Had a long discussion with the patient today lasting approximately 30 min.  Her case is complicated as her pain is only with orgasm.  She does note  that she has mild discomfort with initiation of intercourse despite irrigation.  She may have some vaginal irritation/vulvodynia type pain.  We will try a course of Elavil to see if this helps.  If this does not help, we could consider pelvic floor physical therapy.  I do not feel that her pain is related to endometriosis.  The pain does not cycle only very with her menstrual cycle.    Patient will keep a pain diary and follow up in 6-8 weeks.  All questions were answered.  Vaginal pain  -     amitriptyline (ELAVIL) 10 MG tablet; Take 1 tablet (10 mg total) by mouth nightly as needed for Insomnia.

## 2020-09-21 ENCOUNTER — OFFICE VISIT (OUTPATIENT)
Dept: OBSTETRICS AND GYNECOLOGY | Facility: CLINIC | Age: 40
End: 2020-09-21
Payer: COMMERCIAL

## 2020-09-21 VITALS
HEIGHT: 64 IN | DIASTOLIC BLOOD PRESSURE: 86 MMHG | SYSTOLIC BLOOD PRESSURE: 120 MMHG | WEIGHT: 147.06 LBS | BODY MASS INDEX: 25.11 KG/M2

## 2020-09-21 DIAGNOSIS — R10.2 FEMALE PELVIC PAIN: Primary | ICD-10-CM

## 2020-09-21 PROCEDURE — 99214 PR OFFICE/OUTPT VISIT, EST, LEVL IV, 30-39 MIN: ICD-10-PCS | Mod: S$GLB,,, | Performed by: OBSTETRICS & GYNECOLOGY

## 2020-09-21 PROCEDURE — 3008F BODY MASS INDEX DOCD: CPT | Mod: CPTII,S$GLB,, | Performed by: OBSTETRICS & GYNECOLOGY

## 2020-09-21 PROCEDURE — 99214 OFFICE O/P EST MOD 30 MIN: CPT | Mod: S$GLB,,, | Performed by: OBSTETRICS & GYNECOLOGY

## 2020-09-21 PROCEDURE — 99999 PR PBB SHADOW E&M-EST. PATIENT-LVL III: CPT | Mod: PBBFAC,,, | Performed by: OBSTETRICS & GYNECOLOGY

## 2020-09-21 PROCEDURE — 3008F PR BODY MASS INDEX (BMI) DOCUMENTED: ICD-10-PCS | Mod: CPTII,S$GLB,, | Performed by: OBSTETRICS & GYNECOLOGY

## 2020-09-21 PROCEDURE — 99999 PR PBB SHADOW E&M-EST. PATIENT-LVL III: ICD-10-PCS | Mod: PBBFAC,,, | Performed by: OBSTETRICS & GYNECOLOGY

## 2020-09-22 NOTE — PROGRESS NOTES
Subjective:       Patient ID: Lauren Schmid is a 39 y.o. female.    Chief Complaint:  Pelvic Pain (follow up)      History of Present Illness  HPI  Pt is 39 y.o. here in follow up.  Note at last visit, pt was having pain isolated to times of orgasm.  We tried elavil but after 4 weeks, no improvements so she stopped.  Still taking ocp's with no menses.  Happens 80% of the time still.  No difference if orgasm is obtained through intercourse or masturbation.  No pain with intercourse.    GYN & OB History  No LMP recorded (exact date). (Menstrual status: Birth Control).   Date of Last Pap: No result found    OB History    Para Term  AB Living   0 0 0 0 0 0   SAB TAB Ectopic Multiple Live Births   0 0 0 0 0       Review of Systems  Review of Systems   Constitutional: Negative for activity change, appetite change and fatigue.   HENT: Negative.  Negative for tinnitus.    Eyes: Negative.    Respiratory: Negative for cough and shortness of breath.    Cardiovascular: Negative for chest pain and palpitations.   Gastrointestinal: Negative.  Negative for abdominal pain, blood in stool, constipation, diarrhea and nausea.   Endocrine: Negative.  Negative for hot flashes.   Genitourinary: Negative for dysmenorrhea, dyspareunia, dysuria, frequency, menstrual problem, pelvic pain, vaginal discharge, urinary incontinence and postcoital bleeding.   Musculoskeletal: Negative for back pain and joint swelling.   Integumentary:  Negative for rash, breast mass, nipple discharge and breast skin changes.   Neurological: Negative.  Negative for headaches.   Hematological: Negative.  Does not bruise/bleed easily.   Psychiatric/Behavioral: The patient is not nervous/anxious.    Breast: negative.  Negative for mass, nipple discharge and skin changes          Objective:    Physical Exam     def  Assessment:        1. Female pelvic pain    2. Orgasm disorder                Plan:      Lauren was seen today for pelvic  pain.    Diagnoses and all orders for this visit:    Female pelvic pain  -     Ambulatory referral/consult to Physical/Occupational Therapy; Future  We had a long discussion about the different origins of her pain.  Since this is only at the time of orgasm, it is possible that the muscular tension leads to increase in pelvic floor activity during orgasm that is less organized and the source of her pain (b/c it only lasts about 15 min).  Would start with a PT evaluation first.    We discussed how we could consider Dx LSC to rule out any endometriosis.  The pain pattern doesn't match endometriosis but it is possible.  Will see how pelvic floor PT works first.  We talked for about 35 min.  All questions answered.    Orgasm disorder  -     Ambulatory referral/consult to Physical/Occupational Therapy; Future

## 2020-10-07 ENCOUNTER — CLINICAL SUPPORT (OUTPATIENT)
Dept: REHABILITATION | Facility: OTHER | Age: 40
End: 2020-10-07
Attending: OBSTETRICS & GYNECOLOGY
Payer: COMMERCIAL

## 2020-10-07 DIAGNOSIS — R10.2 FEMALE PELVIC PAIN: ICD-10-CM

## 2020-10-07 DIAGNOSIS — R27.9 LACK OF COORDINATION: ICD-10-CM

## 2020-10-07 DIAGNOSIS — M79.10 MYALGIA: ICD-10-CM

## 2020-10-07 PROCEDURE — 97161 PT EVAL LOW COMPLEX 20 MIN: CPT

## 2020-10-07 PROCEDURE — 97112 NEUROMUSCULAR REEDUCATION: CPT

## 2020-10-07 PROCEDURE — 97140 MANUAL THERAPY 1/> REGIONS: CPT

## 2020-10-08 NOTE — PLAN OF CARE
[facility]  Pelvic Health Physical Therapy Initial Evaluation    Date: 10/7/2020   Name: Lauren Schmid  Clinic Number: [id number]  Therapy Diagnosis: No diagnosis found.  Physician: Eliud Govea MD    Physician Orders: PT Eval and Treat  Medical Diagnosis from Referral:   R10.2 (ICD-10-CM) - Female pelvic pain   F52.31 (ICD-10-CM) - Orgasm disorder   Evaluation Date: 10/7/2020  Authorization Period Expiration: 20 visits through 20  Plan of Care Expiration: 21  Visit # / Visits authorized:     Time In: 2:35  Time Out: 3:30  Total Appointment Time (timed & untimed codes): 55 minutes    Precautions: universal    Subjective     Date of onset: 2 years ago    History of current condition - Lauren reports: Started I/M, cramping immediately after orgasm, lasts 5-10 minutes. Now happens everytime. Started doing yoga daily 18 months ago. Has helped with back problems. Has herniated disc L5-S1 for a long time with back pain. Yoga really helps with that. Has been seeing chiropractor 2.5 years ago once/week. Hx of frequent UTIs, had cystoscopy two years ago. Recently (since January-Feb) began having cramping and bloating after BM, not every one.     OB/GYN History:   Birth Control: was on continuous birth control for 10 years, but recently switched two months ago to different kind and having less spotting and cramping  Sexually active? Yes  Pain with vaginal exams, intercourse or tampon use? No  Pleasure with sex? yes  Ability to achieve orgasm? Yes, painful    Bladder/Bowel History:    Frequency of urination:   Daytime: >2 hours           Nighttime: 1   Complete emptying: Yes   Bladder leakage: No   Urinary Urgency: No   Frequency of bowel movements: 2 times a day, gets bloated and cramping afterwards that lasts 10-15 minutes   Difficulty initiating BM: No   Quality/Shape of BM: Melvin Stool Chart 3-4   History of coccyx injury: no    Pain:  Location: abdomen  Current 0/10, worst  8/10, best 0/10   Description: cramping  Aggravating Factors/Activities that cause symptoms: Vaginal exam/provocation and Bowel movement    Easing Factors: supporting the area with hands     Medical History: Lauren  has a past medical history of Herpes simplex virus (HSV) infection.     Surgical History: Lauren Schmid  has no past surgical history on file.    Medications: Lauren has a current medication list which includes the following prescription(s): l norgest/e.estradiol-e.estrad.    Allergies:   Review of patient's allergies indicates:   Allergen Reactions    Codeine Other (See Comments)     vomitting          Prior Therapy/Previous treatment included: none  Social History:  lives with their partner  Current exercise: yoga every day, walking hour daily  Occupation: Pt works as a  and job-related duties include standing long hours.    Types of fluid intake: water - 75 ounces/day, coffee - 0-1 cups, wine - couple glasses every night  Diet: yogurt, granola bar, salad with grilled chicken, stir tapia for dinner   Habitus: well developed, well nourished  Abuse/Neglect: No     Pts goals: to figure out what is going on, to increase hope for the future    OBJECTIVE     See EMR under MEDIA for written consent provided 10/7/2020  Chaperone: Declined    ORTHO SCREEN  Posture in sitting: slouched   Posture in standing: posterior pelvic tilt , apparent lateral trunk flexion to R  Pelvic alignment: no sign of deviations noted in supine     ABDOMINAL WALL ASSESSMENT  Palpation: WNL  Abdominal strength: poor  Scarring: none  Pelvic Floor Muscle and Transverse Abdominus Synergy: absent  Diastasis: absent      BREATHING MECHANICS ASSESSMENT   Thorax Assessment During Quiet Respiration: Decreased excursion of abdominal wall  and Decreased excursion bilaterally of lateral ribs   Thorax Assessment During Deep Respiration: Decreased excursion of abdominal wall  and Decreased excursion bilaterally of lateral ribs      VAGINAL PELVIC FLOOR EXAM    EXTERNAL ASSESSMENT  Introitus: WNL  Skin condition: redness noted, some introital stenosis  Scarring: none   Sensation: WNL   Pain: none  Voluntary contraction: visible lift  Voluntary relaxation: visible drop  Involuntary contraction: bulge  Bearing down: visible drop  Perineal descent: absent  Comments: n/a      INTERNAL ASSESSMENT  Pain: tender areas noted as follows: throughout, significant sensitivity and max reflexive guarding response   Sensation: able to localized pressure appropriately   Vaginal vault: stenotic   Muscle Bulk: hypertonus   Muscle Power: 2/5  Muscle Endurance: 5 sec  # Reps To Fatigue: 2    Fast Contractions in 10 seconds: 4     Quality of contraction: slow relaxation and incomplete relaxation   Specificity: WNL   Coordination: tends to hold breath during PFM contration   Prolapse check: none  Comments: n/a    TREATMENT     Treatment Time In: 3:05  Treatment Time Out: 3:30  Total Treatment time (time-based codes) separate from Evaluation: 25 minutes    Neuromuscular Re-education to develop Coordination, Control, Down training and Proprioception for 10 minutes including:   pelvic floor relaxation/bulging training, diaphragmatic breathing and diaphragmatic breathing with Kegel    Manual Therapy to develop flexibility, extensibility and desensitization for 15 minutes including:   trigger point/myofascial release of pelvic floor mm, OI, endopelvic fascia    Patient Education provided:   general anatomy/physiology of urinary/ bowel  system and benefits of treatment were discussed with the pt. Additionally, anatomy/physiology of pelvic floor and diaphragmatic breathing were reviewed.     Home Exercises provided:  Written Home Exercises provided: Yes  Exercises were reviewed and Lauren was able to demonstrate them prior to the end of the session.    Lauren demonstrated good  understanding of the education provided.     See EMR under Patient Instructions for  exercises provided 10/7/2020.    Assessment     Lauren is a 39 y.o. female referred to outpatient Physical Therapy with a medical diagnosis of pelvic pain, orgasm disorder. Pt presents with altered posture, poor trunk stability, pelvic floor tenderness, increased tension of the pelvic muscles, poor quality of pelvic muscle contraction and unable to co-contract or co-relax abdominal wall and pelvic floor muscles. Overactivity of mm with high sensitivity noted. Maximal reflexive guarding demonstrated. She will benefit from downtraining as well as relaxation training to use during sexual activity. She also reports similar symptoms after BM and noted myofascial restrictions in abdominal wall. May be worthwhile to exam potential dietary contributors as she reports bloating as well. Worthy of note, pt reports longterm history of continuous use of OCP (20 years).    Pt prognosis is Excellent  Pt will benefit from skilled outpatient Physical Therapy to address the deficits stated above and in the chart below, provide pt/family education, and to maximize pt's level of independence.     Plan of care discussed with patient: Yes  Pt's spiritual, cultural and educational needs considered and patient is agreeable to the plan of care and goals as stated below:     Anticipated Barriers for therapy: None    Medical Necessity is demonstrated by the following:    History  Co-morbidities and personal factors that may impact the plan of care Co-morbidities   none    Personal Factors  no deficits     low   Examination  Body structures and functions, activity limitations and participation restrictions that may impact the plan of care Body Regions/Systems/Functions:  altered posture, poor trunk stability, pelvic floor tenderness, increased tension of the pelvic muscles, poor quality of pelvic muscle contraction and unable to co-contract or co-relax abdominal wall and pelvic floor muscles    Activity Limitations:  intercourse/vaginal  exam/tampon use without pain    Participation Restrictions:  relationship with spouse/partner    Activity limitations:   Learning and applying knowledge  No deficits    General Tasks and Commands  No deficits    Communication  No deficits    Mobility  No deficits    Self care  No deficits    Domestic Life  No deficits    Interactions/Relationships  No deficits    Life Areas  No deficits    Community and Social Life  No deficits       moderate   Clinical Presentation evolving clinical presentation with changing clinical characteristics moderate   Decision Making/ Complexity Score: low       Goals:  Short Term Goals: 4 weeks   Pt indep in HEP  Pt indep in functional brace technique for decreased strain of pelvic structures with activities which increase IAP  Pt demo complete contraction and deactivation of pelvic floor muscles x 10 reps    Long Term Goals: 8 weeks   Pt indep in progressive HEP  Pt reports able to have painfree orgasm for improved participation in ADLs and improved intimacy with partner  Pt reports no pain after BM for 2 weeks for improved participation in ADLs    Plan     Plan of care Certification: 10/7/2020 to 1/5/21.    Outpatient Physical Therapy 1 times weekly for 8 weeks to include the following interventions: therapeutic exercises, therapeutic activity, neuromuscular re-education, manual therapy, patient/family education and self care/home management    Tamara Pizarro, PT

## 2020-10-08 NOTE — PATIENT INSTRUCTIONS
Home Program 10/7/20:    1) Belly breathing - inhale long slow and deep so belly expands with inhalation. Do 5 and follow with:  2) Piston breath - inhale as above, as you exhale contract pelvic floor muscles. Do 10, and follow with:  3) Belly breathing - inhale long slow and deep so belly expands with inhalation. Do 5.    Do above sequence 3x/day

## 2020-10-19 ENCOUNTER — CLINICAL SUPPORT (OUTPATIENT)
Dept: REHABILITATION | Facility: OTHER | Age: 40
End: 2020-10-19
Attending: OBSTETRICS & GYNECOLOGY
Payer: COMMERCIAL

## 2020-10-19 DIAGNOSIS — R27.9 LACK OF COORDINATION: Primary | ICD-10-CM

## 2020-10-19 DIAGNOSIS — M79.10 MYALGIA: ICD-10-CM

## 2020-10-19 PROCEDURE — 97110 THERAPEUTIC EXERCISES: CPT | Mod: PN

## 2020-10-19 PROCEDURE — 97140 MANUAL THERAPY 1/> REGIONS: CPT | Mod: PN

## 2020-10-19 NOTE — PROGRESS NOTES
Pelvic Health Physical Therapy   Treatment Note     Name: Lauren Schmid  Clinic Number: [id number]    Therapy Diagnosis:   Encounter Diagnoses   Name Primary?   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 Back has been sore and has had limited time to do yoga but trying to do at least 10 minutes at night. Harder to get in PT exercises since bakery opening but doing at least once/day.   She was compliant with home exercise program.  Response to previous treatment: felt fine, felt really hopeful and positive after the evaluation  Functional change: no change    Pain: 0/10  Location: no pain    Objective     Lauren received therapeutic exercises to develop  ROM and flexibility for 14 minutes including: stretching: reclined pigeon b/l, hip IR stretch b/l, happy baby, nreathing    Lauren received the following manual therapy techniques: to develop flexibility and extensibility for 40 minutes including: trigger point/myofascial release of pelvic floor mm and abd wall    Lauren participated in neuromuscular re-education activities to develop Coordination, Control, Down training and Proprioception for 00 minutes including:not performed today    Lauren participated in dynamic functional therapeutic activities to improve functional performance for 00 minutes, including:  Not performed today     Home Exercises Provided and  Patient Education Provided     Education provided:   - anatomy/physiology of pelvic floor and diaphragmatic breathing  Discussed progression of plan of care with patient; educated pt in activity modification; reviewed HEP with pt. Pt demonstrated and verbalized understanding of all instruction and was provided with a handout of HEP (see Patient Instructions).    Written Home Exercises Provided: yes.  Exercises were reviewed and Lauren was able to demonstrate them prior to the end of the session.  Lauren demonstrated good  understanding of the education provided.     See EMR under Patient Instructions for exercises provided 10/19/2020.    Assessment     Decreased sensitivity today and improved demo of voluntary relaxation.   Lauren is progressing well towards her goals.   Pt prognosis is Excellent.     Pt will continue to benefit from skilled outpatient physical therapy to address the deficits listed in the problem list box on initial evaluation, provide pt/family education and to maximize pt's level of independence in the home and community environment.     Pt's spiritual, cultural and educational needs considered and pt agreeable to plan of care and goals.     Anticipated barriers to physical therapy: none    Goals:   Short Term Goals: 4 weeks   Pt indep in HEP  Pt indep in functional brace technique for decreased strain of pelvic structures with activities which increase IAP  Pt demo complete contraction and deactivation of pelvic floor muscles x 10 reps     Long Term Goals: 8 weeks   Pt indep in progressive HEP  Pt reports able to have painfree orgasm for improved participation in ADLs and improved intimacy with partner  Pt reports no pain after BM for 2 weeks for improved participation in ADLs      Plan     Manual, progress exercises    Tamara Pizarro, PT

## 2020-10-19 NOTE — PATIENT INSTRUCTIONS
Home Program 10/19/20:    1) Continue same pelvic floor exercise    2) Add these stretches, 60 seconds each. 1 or 2 times/day    Reclined pigeon    Seated version    Hip internal rotation stretch    Happy baby

## 2020-10-21 NOTE — PROGRESS NOTES
Pelvic Health Physical Therapy   Treatment Note     Name: Lauren Schmid  Clinic Number: [id number]    Therapy Diagnosis:   Encounter Diagnoses   Name Primary?    Lack of coordination Yes    Myalgia      Physician: Eliud Govea MD    Visit Date: 10/26/2020    Physician Orders: PT Eval and Treat  Medical Diagnosis from Referral:   R10.2 (ICD-10-CM) - Female pelvic pain   F52.31 (ICD-10-CM) - Orgasm disorder   Evaluation Date: 10/7/2020  Authorization Period Expiration: 20 visits through 12/31/20  Plan of Care Expiration: 1/5/21  Visit # / Visits authorized: 3/ 20    Cancelled Visits: 0  No Show Visits: 0    Time In: 3:30  Time Out: 4:30  Total Billable Time: 60 minutes    Precautions: Standard    Subjective     Pt reports: Had two orgasms this week and both were painfree. BM have also continued painfree. Has continued to notice the pulling jocelynn diagonally across RLQ in morning, lasts about an hour.   She was compliant with home exercise program.  Response to previous treatment: felt fine, felt really hopeful and positive after the evaluation  Functional change: no change    Pain: 0/10  Location: no pain    Objective     Lauren received therapeutic exercises to develop  ROM and flexibility for 20 minutes including: stretching: reclined pigeon b/l, hip IR stretch b/l, happy baby, breathing  Pelvic clocks 12-6, then 3-9, then 5x Cw/CCW  TrA isolation    Lauren received the following manual therapy techniques: to develop flexibility and extensibility for 40 minutes including: trigger point/myofascial release of pelvic floor mm and abd wall    Lauren participated in neuromuscular re-education activities to develop Coordination, Control, Down training and Proprioception for 00 minutes including:not performed today    Lauren participated in dynamic functional therapeutic activities to improve functional performance for 00 minutes, including:  Not performed today     Home Exercises Provided and Patient Education  Provided     Education provided:   - anatomy/physiology of pelvic floor and diaphragmatic breathing  Discussed progression of plan of care with patient; educated pt in activity modification; reviewed HEP with pt. Pt demonstrated and verbalized understanding of all instruction and was provided with a handout of HEP (see Patient Instructions).    Written Home Exercises Provided: yes.  Exercises were reviewed and Lauren was able to demonstrate them prior to the end of the session.  Lauren demonstrated good  understanding of the education provided.     See EMR under Patient Instructions for exercises provided 10/19/2020.    Assessment     Pt symptoms improving. Noted much more relaxation of mm and decreased TTP. Some pain in RLQ to mild pressure which reproduces the sensation she gets in morning.  Lauren is progressing well towards her goals.   Pt prognosis is Excellent.     Pt will continue to benefit from skilled outpatient physical therapy to address the deficits listed in the problem list box on initial evaluation, provide pt/family education and to maximize pt's level of independence in the home and community environment.     Pt's spiritual, cultural and educational needs considered and pt agreeable to plan of care and goals.     Anticipated barriers to physical therapy: none    Goals:   Short Term Goals: 4 weeks   Pt indep in HEP MET  Pt indep in functional brace technique for decreased strain of pelvic structures with activities which increase IAP PROGRESSING  Pt demo complete contraction and deactivation of pelvic floor muscles x 10 reps PROGRESSING     Long Term Goals: 8 weeks   Pt indep in progressive HEP  PROGRESSING  Pt reports able to have painfree orgasm for improved participation in ADLs and improved intimacy with partner  PROGRESSING  Pt reports no pain after BM for 2 weeks for improved participation in ADLs PROGRESSING      Plan     Review and progress Karen Pizarro, PT

## 2020-10-26 ENCOUNTER — CLINICAL SUPPORT (OUTPATIENT)
Dept: REHABILITATION | Facility: OTHER | Age: 40
End: 2020-10-26
Attending: OBSTETRICS & GYNECOLOGY
Payer: COMMERCIAL

## 2020-10-26 DIAGNOSIS — R27.9 LACK OF COORDINATION: Primary | ICD-10-CM

## 2020-10-26 DIAGNOSIS — M79.10 MYALGIA: ICD-10-CM

## 2020-10-26 PROCEDURE — 97110 THERAPEUTIC EXERCISES: CPT | Mod: PN

## 2020-10-26 PROCEDURE — 97140 MANUAL THERAPY 1/> REGIONS: CPT | Mod: PN

## 2020-10-29 NOTE — PROGRESS NOTES
Pelvic Health Physical Therapy   Treatment Note     Name: Lauren Schmid  Clinic Number: [id number]    Therapy Diagnosis:   Encounter Diagnoses   Name Primary?    Lack of coordination Yes    Myalgia      Physician: Eliud Govea MD    Visit Date: 11/2/2020    Physician Orders: PT Eval and Treat  Medical Diagnosis from Referral:   R10.2 (ICD-10-CM) - Female pelvic pain   F52.31 (ICD-10-CM) - Orgasm disorder   Evaluation Date: 10/7/2020  Authorization Period Expiration: 20 visits through 12/31/20  Plan of Care Expiration: 1/5/21  Visit # / Visits authorized: 4/ 20    Cancelled Visits: 0  No Show Visits: 0    Time In:4:35  Time Out: 5:30  Total Billable Time: 55 minutes    Precautions: Standard    Subjective     Pt reports: Still not having pain after BM. Because of storm and stress did not do yoga and stretching as much. Had orgasm yesterday and did not go well - had pain. Tried to stay relaxed through it and thinks maybe pain resolved 1-2 minutes sooner, but not dramatic difference.   She was compliant with home exercise program.  Response to previous treatment: felt fine, felt really hopeful and positive after the evaluation  Functional change: no change    Pain: 0/10  Location: no pain    Objective     Lauren received therapeutic exercises to develop  ROM and flexibility for 15 minutes including: stretching: reclined pigeon b/l, hip IR stretch b/l, happy baby, breathing  Pelvic clocks 10x Cw/CCW  TrA isolation, 5 sec x 10  TrA +  Bridging x 10  Reclined pigeon  PPU stretch    Lauren received the following manual therapy techniques: to develop flexibility and extensibility for 40 minutes including: trigger point/myofascial release of pelvic floor mm and abd wall    Lauren participated in neuromuscular re-education activities to develop Coordination, Control, Down training and Proprioception for 00 minutes including:not performed today    Lauren participated in dynamic functional therapeutic activities to  improve functional performance for 00 minutes, including:  Not performed today     Home Exercises Provided and Patient Education Provided     Education provided:   - anatomy/physiology of pelvic floor and diaphragmatic breathing  Discussed progression of plan of care with patient; educated pt in activity modification; reviewed HEP with pt. Pt demonstrated and verbalized understanding of all instruction and was provided with a handout of HEP (see Patient Instructions).    Written Home Exercises Provided: yes.  Exercises were reviewed and Lauren was able to demonstrate them prior to the end of the session.  Lauren demonstrated good  understanding of the education provided.     See EMR under Patient Instructions for exercises provided 10/19/2020.    Assessment     Some increased tension compared with last session. Required reinforcement of correct TrA isolation.  Lauren is progressing well towards her goals.   Pt prognosis is Excellent.     Pt will continue to benefit from skilled outpatient physical therapy to address the deficits listed in the problem list box on initial evaluation, provide pt/family education and to maximize pt's level of independence in the home and community environment.     Pt's spiritual, cultural and educational needs considered and pt agreeable to plan of care and goals.     Anticipated barriers to physical therapy: none    Goals:   Short Term Goals: 4 weeks   Pt indep in HEP MET  Pt indep in functional brace technique for decreased strain of pelvic structures with activities which increase IAP PROGRESSING  Pt demo complete contraction and deactivation of pelvic floor muscles x 10 reps PROGRESSING     Long Term Goals: 8 weeks   Pt indep in progressive HEP  PROGRESSING  Pt reports able to have painfree orgasm for improved participation in ADLs and improved intimacy with partner  PROGRESSING  Pt reports no pain after BM for 2 weeks for improved participation in ADLs PROGRESSING      Plan      Progress core, add hip strengthening, manual  Tamara Pizarro, PT

## 2020-11-02 ENCOUNTER — CLINICAL SUPPORT (OUTPATIENT)
Dept: REHABILITATION | Facility: OTHER | Age: 40
End: 2020-11-02
Attending: OBSTETRICS & GYNECOLOGY
Payer: COMMERCIAL

## 2020-11-02 DIAGNOSIS — M79.10 MYALGIA: ICD-10-CM

## 2020-11-02 DIAGNOSIS — R27.9 LACK OF COORDINATION: Primary | ICD-10-CM

## 2020-11-02 PROCEDURE — 97140 MANUAL THERAPY 1/> REGIONS: CPT | Mod: PN

## 2020-11-02 PROCEDURE — 97110 THERAPEUTIC EXERCISES: CPT | Mod: PN

## 2020-11-09 ENCOUNTER — CLINICAL SUPPORT (OUTPATIENT)
Dept: REHABILITATION | Facility: OTHER | Age: 40
End: 2020-11-09
Attending: OBSTETRICS & GYNECOLOGY
Payer: COMMERCIAL

## 2020-11-09 DIAGNOSIS — R27.9 LACK OF COORDINATION: Primary | ICD-10-CM

## 2020-11-09 DIAGNOSIS — M79.10 MYALGIA: ICD-10-CM

## 2020-11-09 PROCEDURE — 97110 THERAPEUTIC EXERCISES: CPT | Mod: PN

## 2020-11-09 PROCEDURE — 97140 MANUAL THERAPY 1/> REGIONS: CPT | Mod: PN

## 2020-11-09 NOTE — PROGRESS NOTES
Pelvic Health Physical Therapy   Treatment Note     Name: Lauren Schmid  Clinic Number: [id number]    Therapy Diagnosis:   No diagnosis found.  Physician: Eliud Govea MD    Visit Date: 11/9/2020    Physician Orders: PT Eval and Treat  Medical Diagnosis from Referral:   R10.2 (ICD-10-CM) - Female pelvic pain   F52.31 (ICD-10-CM) - Orgasm disorder   Evaluation Date: 10/7/2020  Authorization Period Expiration: 20 visits through 12/31/20  Plan of Care Expiration: 1/5/21  Visit # / Visits authorized: 5/ 20    Cancelled Visits: 0  No Show Visits: 0    Time In:8:05  Time Out: 9:00  Total Billable Time: 55 minutes    Precautions: Standard    Subjective     Pt reports: Hasn't had orgasm so nothing to report on that. Still hasnt had any pain with BM.  She was compliant with home exercise program.  Response to previous treatment: felt fine, felt really hopeful and positive after the evaluation  Functional change: no change    Pain: 0/10  Location: no pain    Objective     Lauren received therapeutic exercises to develop  ROM and flexibility for 23 minutes including: stretching: reclined pigeon b/l, hip IR stretch b/l, happy baby, breathing  Pelvic clocks 10x 12-6 with coordination with breath and awareness of PFM position  TrA isolation with 5 sec hold and self palpation 1 x 10 add hip adduction co-contract, 1 x 10  Clamshells, 3 x 10    Lauren received the following manual therapy techniques: to develop flexibility and extensibility for 32 minutes including: trigger point/myofascial release of pelvic floor mm and abd wall    Lauren participated in neuromuscular re-education activities to develop Coordination, Control, Down training and Proprioception for 00 minutes including:not performed today    Lauren participated in dynamic functional therapeutic activities to improve functional performance for 00 minutes, including:  Not performed today     Home Exercises Provided and Patient Education Provided     Education  provided:   - anatomy/physiology of pelvic floor and diaphragmatic breathing  Discussed progression of plan of care with patient; educated pt in activity modification; reviewed HEP with pt. Pt demonstrated and verbalized understanding of all instruction and was provided with a handout of HEP (see Patient Instructions).    Written Home Exercises Provided: yes.  Exercises were reviewed and Lauren was able to demonstrate them prior to the end of the session.  Lauren demonstrated good  understanding of the education provided.     See EMR under Patient Instructions for exercises provided 10/19/2020.    Assessment   Continues elevated tension particularly posterior PFM and piriformis. Progressed exercise program for hip strengthening, for improved pelvic stability as pelvic floor may be clenching during prolonged standing at work. Good demo of TrA isolaton, though required cues for maintaining neutral pelvis. Progressed for hip adduction co-contract.  Lauren is progressing well towards her goals.   Pt prognosis is Excellent.     Pt will continue to benefit from skilled outpatient physical therapy to address the deficits listed in the problem list box on initial evaluation, provide pt/family education and to maximize pt's level of independence in the home and community environment.     Pt's spiritual, cultural and educational needs considered and pt agreeable to plan of care and goals.     Anticipated barriers to physical therapy: none    Goals:   Short Term Goals: 4 weeks   Pt indep in HEP MET  Pt indep in functional brace technique for decreased strain of pelvic structures with activities which increase IAP PROGRESSING  Pt demo complete contraction and deactivation of pelvic floor muscles x 10 reps PROGRESSING     Long Term Goals: 8 weeks   Pt indep in progressive HEP  PROGRESSING  Pt reports able to have painfree orgasm for improved participation in ADLs and improved intimacy with partner  PROGRESSING  Pt reports  no pain after BM for 2 weeks for improved participation in ADLs PROGRESSING      Plan     Progress core - planks?, progress hip strengthening - SL hip abd, circles, manual  Tamara Pizarro, PT

## 2020-11-09 NOTE — PATIENT INSTRUCTIONS
Home Program 11/9/20:    1) Clamshells -   Lay on side with knees bent. Lift top knee while keeping ankles together. Do not let pelvis roll backwards.       Do 3 sets of 10.    2) Continue 1 set of 10 of isolating deep core muscle, hold each squeeze for 5 seconds. Feel inside hip bones for correct contraction and keep spine and pelvis  neutral throughout. Add a second set where you engage deep core then squeeze knees together on either a yoga block or folded pillow.

## 2020-12-07 ENCOUNTER — CLINICAL SUPPORT (OUTPATIENT)
Dept: REHABILITATION | Facility: OTHER | Age: 40
End: 2020-12-07
Attending: OBSTETRICS & GYNECOLOGY
Payer: COMMERCIAL

## 2020-12-07 DIAGNOSIS — R27.9 LACK OF COORDINATION: Primary | ICD-10-CM

## 2020-12-07 DIAGNOSIS — M79.10 MYALGIA: ICD-10-CM

## 2020-12-07 PROCEDURE — 97110 THERAPEUTIC EXERCISES: CPT | Mod: PN

## 2020-12-07 PROCEDURE — 97140 MANUAL THERAPY 1/> REGIONS: CPT | Mod: PN

## 2020-12-07 NOTE — PROGRESS NOTES
Pelvic Health Physical Therapy   Treatment Note     Name: Lauren Schmid  Clinic Number: [id number]    Therapy Diagnosis:   Encounter Diagnoses   Name Primary?    Lack of coordination Yes    Myalgia      Physician: Eliud Govea MD    Visit Date: 12/7/2020    Physician Orders: PT Eval and Treat  Medical Diagnosis from Referral:   R10.2 (ICD-10-CM) - Female pelvic pain   F52.31 (ICD-10-CM) - Orgasm disorder   Evaluation Date: 10/7/2020  Authorization Period Expiration: 20 visits through 12/31/20  Plan of Care Expiration: 1/5/21  Visit # / Visits authorized: 6/ 20    Cancelled Visits: 0  No Show Visits: 0    Time In: 4:34  Time Out: 5:29  Total Billable Time: 55 minutes    Precautions: Standard    Subjective     Pt reports: Has had a lot of work stress and didn't have as much time for exercises, yoga, etc. But hasn't had any lower abd pain. Had one orgasm and it wasn't painful.   She was compliant with home exercise program.  Response to previous treatment: felt fine, felt really hopeful and positive after the evaluation  Functional change: no change    Pain: 0/10  Location: no pain    Objective     Lauren received therapeutic exercises to develop  ROM and flexibility for 17 minutes including: stretching: reclined pigeon b/l, hip IR stretch b/l, happy baby, breathing  Cat-cow x 10, open books x 10 b/l  TrA isolation with 5 sec hold and self palpation 1 x 10 add bridge x 10  Clamshells, 3 x 10  Sl hip abd    Lauren received the following manual therapy techniques: to develop flexibility and extensibility for 38 minutes including: trigger point/myofascial release of pelvic floor mm and abd wall, L hip flexor mobilization, CPA through low thoracic and mid lumbar spine    Lauren participated in neuromuscular re-education activities to develop Coordination, Control, Down training and Proprioception for 00 minutes including:not performed today    Lauren participated in dynamic functional therapeutic activities to  improve functional performance for 00 minutes, including:  Not performed today     Home Exercises Provided and Patient Education Provided     Education provided:   - anatomy/physiology of pelvic floor and diaphragmatic breathing  Discussed progression of plan of care with patient; educated pt in activity modification; reviewed HEP with pt. Pt demonstrated and verbalized understanding of all instruction and was provided with a handout of HEP (see Patient Instructions).    Written Home Exercises Provided: yes.  Exercises were reviewed and Lauren was able to demonstrate them prior to the end of the session.  Lauren demonstrated good  understanding of the education provided.     See EMR under Patient Instructions for exercises provided 10/19/2020.    Assessment   Greatly normalized resting position of pelvic floor mm with reduced areas of tenderness. L anterior LA and b/l OI . Some myofascial restrictions noted through lower abd wall, R>L. Tolerates all exercises and progressions well.  Lauren is progressing well towards her goals.   Pt prognosis is Excellent.     Pt will continue to benefit from skilled outpatient physical therapy to address the deficits listed in the problem list box on initial evaluation, provide pt/family education and to maximize pt's level of independence in the home and community environment.     Pt's spiritual, cultural and educational needs considered and pt agreeable to plan of care and goals.     Anticipated barriers to physical therapy: none    Goals:   Short Term Goals: 4 weeks   Pt indep in HEP MET  Pt indep in functional brace technique for decreased strain of pelvic structures with activities which increase IAP PROGRESSING  Pt demo complete contraction and deactivation of pelvic floor muscles x 10 reps PROGRESSING     Long Term Goals: 8 weeks   Pt indep in progressive HEP  PROGRESSING  Pt reports able to have painfree orgasm for improved participation in ADLs and  improved intimacy with partner  PROGRESSING  Pt reports no pain after BM for 2 weeks for improved participation in ADLs PROGRESSING      Plan     Manual, progress hip and core, stretching  Tamara Pizarro, PT

## 2020-12-07 NOTE — PATIENT INSTRUCTIONS
Home Program 12/7/20:    1) Sidelying leg raises - pelvis rolled forward, leg back in line with trunk, lift and lower. 2 sets of 10.      2) Bridges -   Lay on mat with knees bent. Tuck pelvis and lift hips up off table.  Do 10 times and perform 2 sets.

## 2020-12-11 NOTE — PROGRESS NOTES
Pelvic Health Physical Therapy   Treatment Note     Name: Lauren Schmid  Clinic Number: [id number]    Therapy Diagnosis:   Encounter Diagnoses   Name Primary?    Lack of coordination Yes    Myalgia      Physician: Eliud Govea MD    Visit Date: 12/14/2020    Physician Orders: PT Eval and Treat  Medical Diagnosis from Referral:   R10.2 (ICD-10-CM) - Female pelvic pain   F52.31 (ICD-10-CM) - Orgasm disorder   Evaluation Date: 10/7/2020  Authorization Period Expiration: 20 visits through 12/31/20  Plan of Care Expiration: 1/5/21  Visit # / Visits authorized: 7/ 20    Cancelled Visits: 0  No Show Visits: 0    Time In: 4:30  Time Out: 5:25  Total Billable Time: 55 minutes    Precautions: Standard    Subjective     Pt reports: Everything feeling good.   She was compliant with home exercise program.  Response to previous treatment: felt fine, felt really hopeful and positive after the evaluation  Functional change: no change    Pain: 0/10  Location: no pain    Objective     Lauren received therapeutic exercises to develop  ROM and flexibility for 25 minutes including: stretching: reclined pigeon b/l, hip IR stretch b/l, happy baby, breathing  Cat-cow x 10, open books x 10 b/l  TrA + bridge x 10, add bridge + hip add x 10  Clamshells, 3 x 10  Reverse clams 3 x 10  Sl hip abd    Lauren received the following manual therapy techniques: to develop flexibility and extensibility for 30 minutes including: trigger point/myofascial release of pelvic floor mm and abd wall , coccyx mob    Lauren participated in neuromuscular re-education activities to develop Coordination, Control, Down training and Proprioception for 00 minutes including:not performed today    Lauren participated in dynamic functional therapeutic activities to improve functional performance for 00 minutes, including:  Not performed today     Home Exercises Provided and Patient Education Provided     Education provided:   - anatomy/physiology of pelvic  floor and diaphragmatic breathing  Discussed progression of plan of care with patient; educated pt in activity modification; reviewed HEP with pt. Pt demonstrated and verbalized understanding of all instruction and was provided with a handout of HEP (see Patient Instructions).    Written Home Exercises Provided: yes.  Exercises were reviewed and Lauren was able to demonstrate them prior to the end of the session.  Lauren demonstrated good  understanding of the education provided.     See EMR under Patient Instructions for exercises provided 10/19/2020.    Assessment   Most tension posterior pelvic floor mm, coccygeus and OI. Responded well to coccyx mob. Tolerated progressions to strengthening well including pelvic floor, excellent deactivation after each contraction.   Lauren is progressing well towards her goals.   Pt prognosis is Excellent.     Pt will continue to benefit from skilled outpatient physical therapy to address the deficits listed in the problem list box on initial evaluation, provide pt/family education and to maximize pt's level of independence in the home and community environment.     Pt's spiritual, cultural and educational needs considered and pt agreeable to plan of care and goals.     Anticipated barriers to physical therapy: none    Goals:   Short Term Goals: 4 weeks   Pt indep in HEP MET  Pt indep in functional brace technique for decreased strain of pelvic structures with activities which increase IAP PROGRESSING  Pt demo complete contraction and deactivation of pelvic floor muscles x 10 reps PROGRESSING     Long Term Goals: 8 weeks   Pt indep in progressive HEP  PROGRESSING  Pt reports able to have painfree orgasm for improved participation in ADLs and improved intimacy with partner  PROGRESSING  Pt reports no pain after BM for 2 weeks for improved participation in ADLs PROGRESSING      Plan   Manual, progress stretching and strengthening  Tamara Pizarro, PT

## 2020-12-14 ENCOUNTER — CLINICAL SUPPORT (OUTPATIENT)
Dept: REHABILITATION | Facility: OTHER | Age: 40
End: 2020-12-14
Attending: OBSTETRICS & GYNECOLOGY
Payer: COMMERCIAL

## 2020-12-14 DIAGNOSIS — R27.9 LACK OF COORDINATION: Primary | ICD-10-CM

## 2020-12-14 DIAGNOSIS — M79.10 MYALGIA: ICD-10-CM

## 2020-12-14 PROCEDURE — 97110 THERAPEUTIC EXERCISES: CPT | Mod: PN

## 2020-12-14 PROCEDURE — 97140 MANUAL THERAPY 1/> REGIONS: CPT | Mod: PN

## 2020-12-28 ENCOUNTER — CLINICAL SUPPORT (OUTPATIENT)
Dept: REHABILITATION | Facility: OTHER | Age: 40
End: 2020-12-28
Attending: OBSTETRICS & GYNECOLOGY
Payer: COMMERCIAL

## 2020-12-28 DIAGNOSIS — R27.9 LACK OF COORDINATION: Primary | ICD-10-CM

## 2020-12-28 DIAGNOSIS — M79.10 MYALGIA: ICD-10-CM

## 2020-12-28 PROCEDURE — 97140 MANUAL THERAPY 1/> REGIONS: CPT | Mod: PN

## 2020-12-28 PROCEDURE — 97110 THERAPEUTIC EXERCISES: CPT | Mod: PN

## 2020-12-28 NOTE — PROGRESS NOTES
"  Pelvic Health Physical Therapy   Treatment Note     Name: Lauren Schmid  Clinic Number: [id number]    Therapy Diagnosis:   No diagnosis found.  Physician: Eliud Govea MD    Visit Date: 12/28/2020    Physician Orders: PT Eval and Treat  Medical Diagnosis from Referral:   R10.2 (ICD-10-CM) - Female pelvic pain   F52.31 (ICD-10-CM) - Orgasm disorder   Evaluation Date: 10/7/2020  Authorization Period Expiration: 20 visits through 12/31/20  Plan of Care Expiration: 1/5/21  Visit # / Visits authorized: 8/ 20    Cancelled Visits: 0  No Show Visits: 0    Time In: 4:35  Time Out: 5:30  Total Billable Time: 55 minutes    Precautions: Standard    Subjective     Pt reports: Had 3 orgasms since last session and "did not go well". Pain with each.   She was compliant with home exercise program.  Response to previous treatment: felt fine, felt really hopeful and positive after the evaluation  Functional change: no change    Pain: 0/10  Location: no pain    Objective     Lauren received therapeutic exercises to develop  ROM and flexibility for 15 minutes including: stretching: reclined pigeon b/l, hip IR stretch b/l, happy baby, breathing  Cat-cow x 10, open books x 10 b/l      Lauren received the following manual therapy techniques: to develop flexibility and extensibility for 40 minutes including: trigger point/myofascial release of pelvic floor mm and abd wall , coccyx mob, R hip flexor mob, CT mobs abd wall    Lauren participated in neuromuscular re-education activities to develop Coordination, Control, Down training and Proprioception for 00 minutes including:not performed today    Lauren participated in dynamic functional therapeutic activities to improve functional performance for 00 minutes, including:  Not performed today     Home Exercises Provided and Patient Education Provided     Education provided:   - anatomy/physiology of pelvic floor and diaphragmatic breathing  Discussed progression of plan of care with " patient; educated pt in activity modification; reviewed HEP with pt. Pt demonstrated and verbalized understanding of all instruction and was provided with a handout of HEP (see Patient Instructions).    Written Home Exercises Provided: yes.  Exercises were reviewed and Lauren was able to demonstrate them prior to the end of the session.  Lauren demonstrated good  understanding of the education provided.     See EMR under Patient Instructions for exercises provided 10/19/2020.    Assessment   Continues pelvic floor mm tension, treated findings. It seems like overalls ome improvement but continues with fluctuations which make it hard to fully assess if therapy is helping. Will continue two more sessions at bi-weekly intervals with switching HEP to relaxation only to continue to monitor.  Lauren is progressing well towards her goals.   Pt prognosis is Excellent.     Pt will continue to benefit from skilled outpatient physical therapy to address the deficits listed in the problem list box on initial evaluation, provide pt/family education and to maximize pt's level of independence in the home and community environment.     Pt's spiritual, cultural and educational needs considered and pt agreeable to plan of care and goals.     Anticipated barriers to physical therapy: none    Goals:   Short Term Goals: 4 weeks   Pt indep in HEP MET  Pt indep in functional brace technique for decreased strain of pelvic structures with activities which increase IAP PROGRESSING  Pt demo complete contraction and deactivation of pelvic floor muscles x 10 reps PROGRESSING     Long Term Goals: 8 weeks   Pt indep in progressive HEP  PROGRESSING  Pt reports able to have painfree orgasm for improved participation in ADLs and improved intimacy with partner  PROGRESSING  Pt reports no pain after BM for 2 weeks for improved participation in ADLs PROGRESSING      Plan   2 add'l visits at bi-weekly frequency -Manual, stretching   Tamara Pizarro  PT

## 2021-02-08 ENCOUNTER — CLINICAL SUPPORT (OUTPATIENT)
Dept: REHABILITATION | Facility: OTHER | Age: 41
End: 2021-02-08
Attending: OBSTETRICS & GYNECOLOGY
Payer: COMMERCIAL

## 2021-02-08 DIAGNOSIS — R27.9 LACK OF COORDINATION: Primary | ICD-10-CM

## 2021-02-08 DIAGNOSIS — M79.10 MYALGIA: ICD-10-CM

## 2021-02-08 PROCEDURE — 97110 THERAPEUTIC EXERCISES: CPT | Mod: PN

## 2021-02-08 PROCEDURE — 97140 MANUAL THERAPY 1/> REGIONS: CPT | Mod: PN

## 2021-02-20 ENCOUNTER — OFFICE VISIT (OUTPATIENT)
Dept: URGENT CARE | Facility: CLINIC | Age: 41
End: 2021-02-20
Payer: COMMERCIAL

## 2021-02-20 VITALS
BODY MASS INDEX: 23.9 KG/M2 | WEIGHT: 140 LBS | SYSTOLIC BLOOD PRESSURE: 123 MMHG | TEMPERATURE: 99 F | DIASTOLIC BLOOD PRESSURE: 78 MMHG | HEIGHT: 64 IN | RESPIRATION RATE: 18 BRPM | HEART RATE: 95 BPM | OXYGEN SATURATION: 98 %

## 2021-02-20 DIAGNOSIS — J02.9 SORE THROAT: Primary | ICD-10-CM

## 2021-02-20 LAB
CTP QC/QA: YES
CTP QC/QA: YES
MOLECULAR STREP A: NEGATIVE
SARS-COV-2 RDRP RESP QL NAA+PROBE: NEGATIVE

## 2021-02-20 PROCEDURE — 99214 PR OFFICE/OUTPT VISIT, EST, LEVL IV, 30-39 MIN: ICD-10-PCS | Mod: S$GLB,,, | Performed by: NURSE PRACTITIONER

## 2021-02-20 PROCEDURE — U0002 COVID-19 LAB TEST NON-CDC: HCPCS | Mod: QW,S$GLB,, | Performed by: NURSE PRACTITIONER

## 2021-02-20 PROCEDURE — 99214 OFFICE O/P EST MOD 30 MIN: CPT | Mod: S$GLB,,, | Performed by: NURSE PRACTITIONER

## 2021-02-20 PROCEDURE — 3008F BODY MASS INDEX DOCD: CPT | Mod: CPTII,S$GLB,, | Performed by: NURSE PRACTITIONER

## 2021-02-20 PROCEDURE — 87651 STREP A DNA AMP PROBE: CPT | Mod: QW,S$GLB,, | Performed by: NURSE PRACTITIONER

## 2021-02-20 PROCEDURE — 3008F PR BODY MASS INDEX (BMI) DOCUMENTED: ICD-10-PCS | Mod: CPTII,S$GLB,, | Performed by: NURSE PRACTITIONER

## 2021-02-20 PROCEDURE — 87651 POCT STREP A MOLECULAR: ICD-10-PCS | Mod: QW,S$GLB,, | Performed by: NURSE PRACTITIONER

## 2021-02-20 PROCEDURE — U0002: ICD-10-PCS | Mod: QW,S$GLB,, | Performed by: NURSE PRACTITIONER

## 2021-02-20 RX ORDER — MELOXICAM 15 MG/1
TABLET ORAL
COMMUNITY
Start: 2021-02-04 | End: 2021-10-06

## 2021-02-20 RX ORDER — COVID-19 MOLECULAR TEST ASSAY
KIT MISCELLANEOUS
COMMUNITY
Start: 2021-01-04 | End: 2023-08-22

## 2021-04-26 ENCOUNTER — PATIENT MESSAGE (OUTPATIENT)
Dept: RESEARCH | Facility: HOSPITAL | Age: 41
End: 2021-04-26

## 2021-06-04 RX ORDER — LEVONORGESTREL / ETHINYL ESTRADIOL AND ETHINYL ESTRADIOL 150-30(84)
1 KIT ORAL DAILY
Qty: 91 EACH | Refills: 0 | Status: SHIPPED | OUTPATIENT
Start: 2021-06-04 | End: 2021-10-06 | Stop reason: SDUPTHER

## 2021-10-06 ENCOUNTER — OFFICE VISIT (OUTPATIENT)
Dept: OBSTETRICS AND GYNECOLOGY | Facility: CLINIC | Age: 41
End: 2021-10-06
Payer: COMMERCIAL

## 2021-10-06 VITALS
BODY MASS INDEX: 23.53 KG/M2 | WEIGHT: 137.81 LBS | HEIGHT: 64 IN | SYSTOLIC BLOOD PRESSURE: 128 MMHG | DIASTOLIC BLOOD PRESSURE: 80 MMHG

## 2021-10-06 DIAGNOSIS — Z01.419 WELL WOMAN EXAM WITH ROUTINE GYNECOLOGICAL EXAM: Primary | ICD-10-CM

## 2021-10-06 DIAGNOSIS — Z30.41 ENCOUNTER FOR SURVEILLANCE OF CONTRACEPTIVE PILLS: ICD-10-CM

## 2021-10-06 PROCEDURE — 1159F MED LIST DOCD IN RCRD: CPT | Mod: CPTII,S$GLB,, | Performed by: NURSE PRACTITIONER

## 2021-10-06 PROCEDURE — 99396 PREV VISIT EST AGE 40-64: CPT | Mod: S$GLB,,, | Performed by: NURSE PRACTITIONER

## 2021-10-06 PROCEDURE — 3008F PR BODY MASS INDEX (BMI) DOCUMENTED: ICD-10-PCS | Mod: CPTII,S$GLB,, | Performed by: NURSE PRACTITIONER

## 2021-10-06 PROCEDURE — 99396 PR PREVENTIVE VISIT,EST,40-64: ICD-10-PCS | Mod: S$GLB,,, | Performed by: NURSE PRACTITIONER

## 2021-10-06 PROCEDURE — 3074F PR MOST RECENT SYSTOLIC BLOOD PRESSURE < 130 MM HG: ICD-10-PCS | Mod: CPTII,S$GLB,, | Performed by: NURSE PRACTITIONER

## 2021-10-06 PROCEDURE — 3074F SYST BP LT 130 MM HG: CPT | Mod: CPTII,S$GLB,, | Performed by: NURSE PRACTITIONER

## 2021-10-06 PROCEDURE — 1160F RVW MEDS BY RX/DR IN RCRD: CPT | Mod: CPTII,S$GLB,, | Performed by: NURSE PRACTITIONER

## 2021-10-06 PROCEDURE — 1160F PR REVIEW ALL MEDS BY PRESCRIBER/CLIN PHARMACIST DOCUMENTED: ICD-10-PCS | Mod: CPTII,S$GLB,, | Performed by: NURSE PRACTITIONER

## 2021-10-06 PROCEDURE — 3079F DIAST BP 80-89 MM HG: CPT | Mod: CPTII,S$GLB,, | Performed by: NURSE PRACTITIONER

## 2021-10-06 PROCEDURE — 3008F BODY MASS INDEX DOCD: CPT | Mod: CPTII,S$GLB,, | Performed by: NURSE PRACTITIONER

## 2021-10-06 PROCEDURE — 1159F PR MEDICATION LIST DOCUMENTED IN MEDICAL RECORD: ICD-10-PCS | Mod: CPTII,S$GLB,, | Performed by: NURSE PRACTITIONER

## 2021-10-06 PROCEDURE — 99999 PR PBB SHADOW E&M-EST. PATIENT-LVL III: CPT | Mod: PBBFAC,,, | Performed by: NURSE PRACTITIONER

## 2021-10-06 PROCEDURE — 99999 PR PBB SHADOW E&M-EST. PATIENT-LVL III: ICD-10-PCS | Mod: PBBFAC,,, | Performed by: NURSE PRACTITIONER

## 2021-10-06 PROCEDURE — 3079F PR MOST RECENT DIASTOLIC BLOOD PRESSURE 80-89 MM HG: ICD-10-PCS | Mod: CPTII,S$GLB,, | Performed by: NURSE PRACTITIONER

## 2021-10-06 RX ORDER — LEVONORGESTREL / ETHINYL ESTRADIOL AND ETHINYL ESTRADIOL 150-30(84)
1 KIT ORAL DAILY
Qty: 91 EACH | Refills: 3 | Status: SHIPPED | OUTPATIENT
Start: 2021-10-06 | End: 2022-09-15 | Stop reason: SDUPTHER

## 2021-10-06 RX ORDER — AMOXICILLIN 875 MG/1
875 TABLET, FILM COATED ORAL 2 TIMES DAILY
COMMUNITY
Start: 2021-10-05 | End: 2023-08-22

## 2021-10-08 ENCOUNTER — IMMUNIZATION (OUTPATIENT)
Dept: PHARMACY | Facility: CLINIC | Age: 41
End: 2021-10-08
Payer: COMMERCIAL

## 2021-11-06 NOTE — PROGRESS NOTES
Therapy plan by Tamara reviewed and I agree with the plan.  Thanks,     Eliud Govea MD       stretcher

## 2022-04-20 ENCOUNTER — OFFICE VISIT (OUTPATIENT)
Dept: URGENT CARE | Facility: CLINIC | Age: 42
End: 2022-04-20
Payer: COMMERCIAL

## 2022-04-20 VITALS
HEART RATE: 96 BPM | SYSTOLIC BLOOD PRESSURE: 137 MMHG | DIASTOLIC BLOOD PRESSURE: 84 MMHG | RESPIRATION RATE: 18 BRPM | WEIGHT: 137 LBS | OXYGEN SATURATION: 98 % | HEIGHT: 64 IN | BODY MASS INDEX: 23.39 KG/M2 | TEMPERATURE: 98 F

## 2022-04-20 DIAGNOSIS — M25.521 RIGHT ELBOW PAIN: Primary | ICD-10-CM

## 2022-04-20 DIAGNOSIS — M77.8 ELBOW TENDONITIS: ICD-10-CM

## 2022-04-20 PROCEDURE — 3075F PR MOST RECENT SYSTOLIC BLOOD PRESS GE 130-139MM HG: ICD-10-PCS | Mod: CPTII,S$GLB,, | Performed by: NURSE PRACTITIONER

## 2022-04-20 PROCEDURE — 73080 XR ELBOW COMPLETE 3 VIEW RIGHT: ICD-10-PCS | Mod: FY,RT,S$GLB, | Performed by: RADIOLOGY

## 2022-04-20 PROCEDURE — 3075F SYST BP GE 130 - 139MM HG: CPT | Mod: CPTII,S$GLB,, | Performed by: NURSE PRACTITIONER

## 2022-04-20 PROCEDURE — 3008F BODY MASS INDEX DOCD: CPT | Mod: CPTII,S$GLB,, | Performed by: NURSE PRACTITIONER

## 2022-04-20 PROCEDURE — 1159F MED LIST DOCD IN RCRD: CPT | Mod: CPTII,S$GLB,, | Performed by: NURSE PRACTITIONER

## 2022-04-20 PROCEDURE — 1159F PR MEDICATION LIST DOCUMENTED IN MEDICAL RECORD: ICD-10-PCS | Mod: CPTII,S$GLB,, | Performed by: NURSE PRACTITIONER

## 2022-04-20 PROCEDURE — 3079F PR MOST RECENT DIASTOLIC BLOOD PRESSURE 80-89 MM HG: ICD-10-PCS | Mod: CPTII,S$GLB,, | Performed by: NURSE PRACTITIONER

## 2022-04-20 PROCEDURE — 73080 X-RAY EXAM OF ELBOW: CPT | Mod: FY,RT,S$GLB, | Performed by: RADIOLOGY

## 2022-04-20 PROCEDURE — 1160F PR REVIEW ALL MEDS BY PRESCRIBER/CLIN PHARMACIST DOCUMENTED: ICD-10-PCS | Mod: CPTII,S$GLB,, | Performed by: NURSE PRACTITIONER

## 2022-04-20 PROCEDURE — 3079F DIAST BP 80-89 MM HG: CPT | Mod: CPTII,S$GLB,, | Performed by: NURSE PRACTITIONER

## 2022-04-20 PROCEDURE — 99213 OFFICE O/P EST LOW 20 MIN: CPT | Mod: S$GLB,,, | Performed by: NURSE PRACTITIONER

## 2022-04-20 PROCEDURE — 3008F PR BODY MASS INDEX (BMI) DOCUMENTED: ICD-10-PCS | Mod: CPTII,S$GLB,, | Performed by: NURSE PRACTITIONER

## 2022-04-20 PROCEDURE — 1160F RVW MEDS BY RX/DR IN RCRD: CPT | Mod: CPTII,S$GLB,, | Performed by: NURSE PRACTITIONER

## 2022-04-20 PROCEDURE — 99213 PR OFFICE/OUTPT VISIT, EST, LEVL III, 20-29 MIN: ICD-10-PCS | Mod: S$GLB,,, | Performed by: NURSE PRACTITIONER

## 2022-04-20 RX ORDER — DICLOFENAC SODIUM 10 MG/G
2 GEL TOPICAL 4 TIMES DAILY
Qty: 1 EACH | Refills: 0 | Status: SHIPPED | OUTPATIENT
Start: 2022-04-20 | End: 2023-08-22

## 2022-04-20 RX ORDER — MELOXICAM 7.5 MG/1
7.5 TABLET ORAL DAILY
Qty: 14 TABLET | Refills: 0 | Status: SHIPPED | OUTPATIENT
Start: 2022-04-20 | End: 2022-05-04

## 2022-04-20 NOTE — PROGRESS NOTES
"Subjective:       Patient ID: Lauren Schmid is a 41 y.o. female.    Vitals:  height is 5' 4" (1.626 m) and weight is 62.1 kg (137 lb). Her temperature is 97.9 °F (36.6 °C). Her blood pressure is 137/84 and her pulse is 96. Her respiration is 18 and oxygen saturation is 98%.     Chief Complaint: Elbow Pain    Patient reports that she has been having right elbow pain for the last several weeks. She is a  and thought it was from over use. This weekend the pain got worse and started moving into her forearm and is now having weakness in her right arm.   Provider note begins below  Patient is a patient .  She has an orthopedic that she sees for her back.  She just finished a Medrol Dosepak which has not helped for the right elbow pain.  Pain is provoked with flexing and extending the bicep.  Denies any known trauma.  States that it has been intermittent and usually goes away with rest.  This time is not.  He mobilizing arm has helped.    Elbow Pain  This is a new problem. The current episode started 1 to 4 weeks ago. Associated symptoms include arthralgias and joint swelling. Pertinent negatives include no chest pain, coughing, diaphoresis, fatigue, fever, nausea or vomiting. The symptoms are aggravated by bending. She has tried NSAIDs and ice for the symptoms. The treatment provided mild relief.       Constitution: Negative for sweating, fatigue and fever.   Cardiovascular: Negative for chest pain and sob on exertion.   Respiratory: Negative for cough and shortness of breath.    Gastrointestinal: Negative for nausea, vomiting, constipation and diarrhea.   Musculoskeletal: Positive for pain, joint pain and joint swelling. Negative for trauma.       Objective:      Physical Exam   Constitutional: She is oriented to person, place, and time.   HENT:   Head: Normocephalic and atraumatic.   Cardiovascular: Normal rate and regular rhythm.   Pulmonary/Chest: Effort normal and breath sounds normal. No " respiratory distress.   Abdominal: Normal appearance.   Musculoskeletal:      Right elbow: She exhibits decreased range of motion. She exhibits no swelling, no effusion, no deformity and no laceration. Tenderness found. Medial epicondyle tenderness noted.   Neurological: She is alert and oriented to person, place, and time.   Skin: Skin is warm and dry.   Psychiatric: Her behavior is normal. Mood normal.           XR ELBOW COMPLETE 3 VIEW RIGHT    Result Date: 4/20/2022  EXAMINATION: XR ELBOW COMPLETE 3 VIEW RIGHT CLINICAL HISTORY: . Pain in right elbow TECHNIQUE: AP, lateral, and oblique views of the right elbow were performed. COMPARISON: None FINDINGS: No fracture.  No malalignment.  No joint effusion.  No obvious soft tissue swelling.     No acute or significant bony findings. Electronically signed by: Asael Rico Date:    04/20/2022 Time:    15:30  Assessment:       1. Right elbow pain    2. Elbow tendonitis          Plan:       Patient had recent Medrol Dosepak.  Will lack of steroids today.  Follow-up with orthopedic  Topical Voltaren gel  Mobic.  Do not take with other anti-inflammatories.  Recommend an elbow brace, rest in eyes.  Concerning for right biceps tendon inflammation  Right elbow x ray- no fracture        Right elbow pain  -     XR ELBOW COMPLETE 3 VIEW RIGHT; Future; Expected date: 04/20/2022  -     diclofenac sodium (VOLTAREN) 1 % Gel; Apply 2 g topically 4 (four) times daily. for 5 days  Dispense: 1 each; Refill: 0  -     meloxicam (MOBIC) 7.5 MG tablet; Take 1 tablet (7.5 mg total) by mouth once daily. for 14 days  Dispense: 14 tablet; Refill: 0    Elbow tendonitis

## 2022-09-15 DIAGNOSIS — Z30.41 ENCOUNTER FOR SURVEILLANCE OF CONTRACEPTIVE PILLS: ICD-10-CM

## 2022-09-15 RX ORDER — LEVONORGESTREL / ETHINYL ESTRADIOL AND ETHINYL ESTRADIOL 150-30(84)
1 KIT ORAL DAILY
Qty: 91 EACH | Refills: 0 | Status: SHIPPED | OUTPATIENT
Start: 2022-09-15 | End: 2022-10-13 | Stop reason: SDUPTHER

## 2022-10-13 ENCOUNTER — OFFICE VISIT (OUTPATIENT)
Dept: OBSTETRICS AND GYNECOLOGY | Facility: CLINIC | Age: 42
End: 2022-10-13
Payer: COMMERCIAL

## 2022-10-13 VITALS
BODY MASS INDEX: 23.45 KG/M2 | HEIGHT: 64 IN | DIASTOLIC BLOOD PRESSURE: 82 MMHG | WEIGHT: 137.38 LBS | SYSTOLIC BLOOD PRESSURE: 120 MMHG

## 2022-10-13 DIAGNOSIS — Z12.31 VISIT FOR SCREENING MAMMOGRAM: ICD-10-CM

## 2022-10-13 DIAGNOSIS — Z01.419 WELL WOMAN EXAM WITH ROUTINE GYNECOLOGICAL EXAM: Primary | ICD-10-CM

## 2022-10-13 DIAGNOSIS — Z30.41 ENCOUNTER FOR SURVEILLANCE OF CONTRACEPTIVE PILLS: ICD-10-CM

## 2022-10-13 DIAGNOSIS — Z12.4 PAP SMEAR FOR CERVICAL CANCER SCREENING: ICD-10-CM

## 2022-10-13 PROCEDURE — 1160F PR REVIEW ALL MEDS BY PRESCRIBER/CLIN PHARMACIST DOCUMENTED: ICD-10-PCS | Mod: CPTII,S$GLB,, | Performed by: NURSE PRACTITIONER

## 2022-10-13 PROCEDURE — 3079F PR MOST RECENT DIASTOLIC BLOOD PRESSURE 80-89 MM HG: ICD-10-PCS | Mod: CPTII,S$GLB,, | Performed by: NURSE PRACTITIONER

## 2022-10-13 PROCEDURE — 1160F RVW MEDS BY RX/DR IN RCRD: CPT | Mod: CPTII,S$GLB,, | Performed by: NURSE PRACTITIONER

## 2022-10-13 PROCEDURE — 99396 PREV VISIT EST AGE 40-64: CPT | Mod: S$GLB,,, | Performed by: NURSE PRACTITIONER

## 2022-10-13 PROCEDURE — 3074F PR MOST RECENT SYSTOLIC BLOOD PRESSURE < 130 MM HG: ICD-10-PCS | Mod: CPTII,S$GLB,, | Performed by: NURSE PRACTITIONER

## 2022-10-13 PROCEDURE — 3074F SYST BP LT 130 MM HG: CPT | Mod: CPTII,S$GLB,, | Performed by: NURSE PRACTITIONER

## 2022-10-13 PROCEDURE — 88175 CYTOPATH C/V AUTO FLUID REDO: CPT | Performed by: NURSE PRACTITIONER

## 2022-10-13 PROCEDURE — 87624 HPV HI-RISK TYP POOLED RSLT: CPT | Performed by: NURSE PRACTITIONER

## 2022-10-13 PROCEDURE — 99999 PR PBB SHADOW E&M-EST. PATIENT-LVL III: ICD-10-PCS | Mod: PBBFAC,,, | Performed by: NURSE PRACTITIONER

## 2022-10-13 PROCEDURE — 3079F DIAST BP 80-89 MM HG: CPT | Mod: CPTII,S$GLB,, | Performed by: NURSE PRACTITIONER

## 2022-10-13 PROCEDURE — 99396 PR PREVENTIVE VISIT,EST,40-64: ICD-10-PCS | Mod: S$GLB,,, | Performed by: NURSE PRACTITIONER

## 2022-10-13 PROCEDURE — 1159F MED LIST DOCD IN RCRD: CPT | Mod: CPTII,S$GLB,, | Performed by: NURSE PRACTITIONER

## 2022-10-13 PROCEDURE — 1159F PR MEDICATION LIST DOCUMENTED IN MEDICAL RECORD: ICD-10-PCS | Mod: CPTII,S$GLB,, | Performed by: NURSE PRACTITIONER

## 2022-10-13 PROCEDURE — 99999 PR PBB SHADOW E&M-EST. PATIENT-LVL III: CPT | Mod: PBBFAC,,, | Performed by: NURSE PRACTITIONER

## 2022-10-13 RX ORDER — LEVONORGESTREL / ETHINYL ESTRADIOL AND ETHINYL ESTRADIOL 150-30(84)
1 KIT ORAL DAILY
Qty: 91 EACH | Refills: 3 | Status: SHIPPED | OUTPATIENT
Start: 2022-10-13 | End: 2023-10-16 | Stop reason: SDUPTHER

## 2022-10-13 NOTE — PROGRESS NOTES
CC: Annual  HPI: Pt is a 41 y.o.  female who presents for routine annual exam. She uses ocps for contraception. She does not want STD screening. Wants to start doing mammograms through [facility] versus [facility]. Met with pelvic floor PT a few years back for pelvic pain and orgasm disorder- did help some. Pain still occurs sometimes but is much more manageable. Works as a  on [employer] at [employer].    Notes from last gyn visit with me in 2021-  CC: Annual  HPI: Pt is a 40 y.o.  female who presents for routine annual exam. She uses OCPs for contraception. She does not want STD screening. Likes pills, requesting refill today. [place]- results were negative, told she does have dense breasts. Initial BP reading elevated- repeat normal. No personal history of HTN, mother does have it.   Notes from dr zhou visit in 2020 with pelvic pain  History of Present Illness  HPI  Pt is 39 y.o. here in follow up.  Note at last visit, pt was having pain isolated to times of orgasm.  We tried elavil but after 4 weeks, no improvements so she stopped.  Still taking ocp's with no menses.  Happens 80% of the time still.  No difference if orgasm is obtained through intercourse or masturbation.  No pain with intercourse.    FH:   Breast cancer: none  Colon cancer: none  Ovarian cancer: none  Uterine cancer: none    Last pap smear: 2019 at [facility]  History of abnormal pap smears: no   Colonoscopy: na  DEXA: na  Mammogram: current- October 2021 negative at [place]  STD history: HSV  Birth control: ocps  OB history: G0  Tobacco use: no    ROS:  GENERAL: Feeling well overall. Denies fever or chills.   SKIN: Denies rash or lesions.   HEAD: Denies head injury or headache.   NODES: Denies enlarged lymph nodes.   CHEST: Denies chest pain or shortness of breath.   CARDIOVASCULAR: Denies palpitations or left sided chest pain.   ABDOMEN: No abdominal pain, constipation, diarrhea, nausea, vomiting or rectal  bleeding.   URINARY: No dysuria, hematuria, or burning on urination.  REPRODUCTIVE: See HPI.   BREASTS: Denies pain, lumps, or nipple discharge.   HEMATOLOGIC: No easy bruisability or excessive bleeding.   MUSCULOSKELETAL: Denies joint pain or swelling.   NEUROLOGIC: Denies syncope or weakness.   PSYCHIATRIC: Denies depression, anxiety or mood swings.    PE:   APPEARANCE: Well nourished, well developed, White female in no acute distress.  NODES: no cervical, supraclavicular, or inguinal lymphadenopathy  BREASTS: Symmetrical, no skin changes or visible lesions. No palpable masses, nipple discharge or adenopathy bilaterally.  ABDOMEN: Soft. No tenderness or masses. No distention. No hernias palpated. No CVA tenderness.  VULVA: No lesions. Normal external female genitalia.  URETHRAL MEATUS: Normal size and location, no lesions, no prolapse.  URETHRA: No masses, tenderness, or prolapse.  VAGINA: Moist. No lesions or lacerations noted. No abnormal discharge present. No odor present.   CERVIX: No lesions or discharge. No cervical motion tenderness.   UTERUS: Normal size, regular shape, mobile, non-tender.  ADNEXA: No tenderness. No fullness or masses palpated in the adnexal regions.   ANUS PERINEUM: Normal.      Diagnosis:  1. Well woman exam with routine gynecological exam    2. Encounter for surveillance of contraceptive pills    3. Pap smear for cervical cancer screening    4. Visit for screening mammogram        Plan:     Orders Placed This Encounter    HPV High Risk Genotypes, PCR    Mammo Digital Screening Bilat w/ Neri    Liquid-Based Pap Smear, Screening    L norgest/e.estradioL-e.estrad (SEASONIQUE) 0.15 mg-30 mcg (84)/10 mcg (7) 3MPk     Pap updated  Mammogram scheduled  C/w ocps    Patient was counseled today on the new ACS guidelines for cervical cytology screening as well as the current recommendations for breast cancer screening. She was counseled to follow up with her PCP for other routine health  maintenance. Counseling session lasted approximately 10 minutes, and all her questions were answered.  For women over the age of 65, you can stop having cervical cancer screenings if you have never had abnormal cervical cells or cervical cancer, and youve had three negative Pap tests in a row. (You also can stop screening if youve had two negative Pap and HPV tests in a row in the past 10 years, with at least one test in the past 5 years.),    Follow-up with me in 1 year for routine exam; pap in 3 years.     I spent a total of 15 minutes on the day of the visit.This includes face to face time and non-face to face time preparing to see the patient (eg, review of tests), obtaining and/or reviewing separately obtained history, documenting clinical information in the electronic or other health record, independently interpreting results and communicating results to the patient/family/caregiver, or care coordinator.

## 2022-10-19 LAB
CLINICAL INFO: NORMAL
CYTO CVX: NORMAL
CYTOLOGIST CVX/VAG CYTO: NORMAL
CYTOLOGIST CVX/VAG CYTO: NORMAL
CYTOLOGY CMNT CVX/VAG CYTO-IMP: NORMAL
CYTOLOGY PAP THIN PREP EXPLANATION: NORMAL
DATE OF PREVIOUS PAP: NO
DATE PREVIOUS BX: NO
GEN CATEG CVX/VAG CYTO-IMP: NORMAL
HPV I/H RISK 4 DNA CVX QL NAA+PROBE: NOT DETECTED
LMP START DATE: NORMAL
MICROORGANISM CVX/VAG CYTO: NORMAL
PATHOLOGIST CVX/VAG CYTO: NORMAL
SERVICE CMNT-IMP: NORMAL
SPECIMEN SOURCE CVX/VAG CYTO: NORMAL
STAT OF ADQ CVX/VAG CYTO-IMP: NORMAL

## 2023-01-05 ENCOUNTER — HOSPITAL ENCOUNTER (OUTPATIENT)
Dept: RADIOLOGY | Facility: OTHER | Age: 43
Discharge: HOME | End: 2023-01-05
Attending: NURSE PRACTITIONER
Payer: COMMERCIAL

## 2023-01-05 DIAGNOSIS — Z12.31 VISIT FOR SCREENING MAMMOGRAM: ICD-10-CM

## 2023-01-05 PROCEDURE — 77067 SCR MAMMO BI INCL CAD: CPT | Mod: TC

## 2023-01-05 PROCEDURE — 77067 SCR MAMMO BI INCL CAD: CPT | Mod: 26,,, | Performed by: RADIOLOGY

## 2023-01-05 PROCEDURE — 77063 MAMMO DIGITAL SCREENING BILAT WITH TOMO: ICD-10-PCS | Mod: 26,,, | Performed by: RADIOLOGY

## 2023-01-05 PROCEDURE — 77067 MAMMO DIGITAL SCREENING BILAT WITH TOMO: ICD-10-PCS | Mod: 26,,, | Performed by: RADIOLOGY

## 2023-01-05 PROCEDURE — 77063 BREAST TOMOSYNTHESIS BI: CPT | Mod: 26,,, | Performed by: RADIOLOGY

## 2023-08-22 ENCOUNTER — OFFICE VISIT (OUTPATIENT)
Dept: OBSTETRICS AND GYNECOLOGY | Facility: CLINIC | Age: 43
End: 2023-08-22
Payer: COMMERCIAL

## 2023-08-22 ENCOUNTER — LAB VISIT (OUTPATIENT)
Dept: LAB | Facility: OTHER | Age: 43
End: 2023-08-22
Attending: NURSE PRACTITIONER
Payer: COMMERCIAL

## 2023-08-22 VITALS
WEIGHT: 140.63 LBS | DIASTOLIC BLOOD PRESSURE: 90 MMHG | BODY MASS INDEX: 24.14 KG/M2 | SYSTOLIC BLOOD PRESSURE: 130 MMHG

## 2023-08-22 DIAGNOSIS — R63.5 WEIGHT GAIN: ICD-10-CM

## 2023-08-22 DIAGNOSIS — R63.5 WEIGHT GAIN: Primary | ICD-10-CM

## 2023-08-22 LAB
BASOPHILS # BLD AUTO: 0.06 K/UL (ref 0–0.2)
BASOPHILS NFR BLD: 1 % (ref 0–1.9)
DIFFERENTIAL METHOD: NORMAL
EOSINOPHIL # BLD AUTO: 0.1 K/UL (ref 0–0.5)
EOSINOPHIL NFR BLD: 2.1 % (ref 0–8)
ERYTHROCYTE [DISTWIDTH] IN BLOOD BY AUTOMATED COUNT: 12.9 % (ref 11.5–14.5)
ESTIMATED AVG GLUCOSE: 88 MG/DL (ref 68–131)
HBA1C MFR BLD: 4.7 % (ref 4–5.6)
HCT VFR BLD AUTO: 43.6 % (ref 37–48.5)
HGB BLD-MCNC: 14 G/DL (ref 12–16)
IMM GRANULOCYTES # BLD AUTO: 0.03 K/UL (ref 0–0.04)
IMM GRANULOCYTES NFR BLD AUTO: 0.5 % (ref 0–0.5)
LYMPHOCYTES # BLD AUTO: 1.8 K/UL (ref 1–4.8)
LYMPHOCYTES NFR BLD: 30.1 % (ref 18–48)
MCH RBC QN AUTO: 29.1 PG (ref 27–31)
MCHC RBC AUTO-ENTMCNC: 32.1 G/DL (ref 32–36)
MCV RBC AUTO: 91 FL (ref 82–98)
MONOCYTES # BLD AUTO: 0.5 K/UL (ref 0.3–1)
MONOCYTES NFR BLD: 8.9 % (ref 4–15)
NEUTROPHILS # BLD AUTO: 3.3 K/UL (ref 1.8–7.7)
NEUTROPHILS NFR BLD: 57.4 % (ref 38–73)
NRBC BLD-RTO: 0 /100 WBC
PLATELET # BLD AUTO: 198 K/UL (ref 150–450)
PMV BLD AUTO: 10.6 FL (ref 9.2–12.9)
RBC # BLD AUTO: 4.81 M/UL (ref 4–5.4)
T4 FREE SERPL-MCNC: 0.79 NG/DL (ref 0.71–1.51)
TSH SERPL DL<=0.005 MIU/L-ACNC: 1.32 UIU/ML (ref 0.4–4)
WBC # BLD AUTO: 5.82 K/UL (ref 3.9–12.7)

## 2023-08-22 PROCEDURE — 3008F BODY MASS INDEX DOCD: CPT | Mod: CPTII,S$GLB,, | Performed by: NURSE PRACTITIONER

## 2023-08-22 PROCEDURE — 1160F PR REVIEW ALL MEDS BY PRESCRIBER/CLIN PHARMACIST DOCUMENTED: ICD-10-PCS | Mod: CPTII,S$GLB,, | Performed by: NURSE PRACTITIONER

## 2023-08-22 PROCEDURE — 1160F RVW MEDS BY RX/DR IN RCRD: CPT | Mod: CPTII,S$GLB,, | Performed by: NURSE PRACTITIONER

## 2023-08-22 PROCEDURE — 99999 PR PBB SHADOW E&M-EST. PATIENT-LVL III: CPT | Mod: PBBFAC,,, | Performed by: NURSE PRACTITIONER

## 2023-08-22 PROCEDURE — 3008F PR BODY MASS INDEX (BMI) DOCUMENTED: ICD-10-PCS | Mod: CPTII,S$GLB,, | Performed by: NURSE PRACTITIONER

## 2023-08-22 PROCEDURE — 83036 HEMOGLOBIN GLYCOSYLATED A1C: CPT | Performed by: NURSE PRACTITIONER

## 2023-08-22 PROCEDURE — 99213 PR OFFICE/OUTPT VISIT, EST, LEVL III, 20-29 MIN: ICD-10-PCS | Mod: S$GLB,,, | Performed by: NURSE PRACTITIONER

## 2023-08-22 PROCEDURE — 85025 COMPLETE CBC W/AUTO DIFF WBC: CPT | Performed by: NURSE PRACTITIONER

## 2023-08-22 PROCEDURE — 84439 ASSAY OF FREE THYROXINE: CPT | Performed by: NURSE PRACTITIONER

## 2023-08-22 PROCEDURE — 99213 OFFICE O/P EST LOW 20 MIN: CPT | Mod: S$GLB,,, | Performed by: NURSE PRACTITIONER

## 2023-08-22 PROCEDURE — 1159F PR MEDICATION LIST DOCUMENTED IN MEDICAL RECORD: ICD-10-PCS | Mod: CPTII,S$GLB,, | Performed by: NURSE PRACTITIONER

## 2023-08-22 PROCEDURE — 3080F DIAST BP >= 90 MM HG: CPT | Mod: CPTII,S$GLB,, | Performed by: NURSE PRACTITIONER

## 2023-08-22 PROCEDURE — 36415 COLL VENOUS BLD VENIPUNCTURE: CPT | Performed by: NURSE PRACTITIONER

## 2023-08-22 PROCEDURE — 3075F SYST BP GE 130 - 139MM HG: CPT | Mod: CPTII,S$GLB,, | Performed by: NURSE PRACTITIONER

## 2023-08-22 PROCEDURE — 3075F PR MOST RECENT SYSTOLIC BLOOD PRESS GE 130-139MM HG: ICD-10-PCS | Mod: CPTII,S$GLB,, | Performed by: NURSE PRACTITIONER

## 2023-08-22 PROCEDURE — 84443 ASSAY THYROID STIM HORMONE: CPT | Performed by: NURSE PRACTITIONER

## 2023-08-22 PROCEDURE — 99999 PR PBB SHADOW E&M-EST. PATIENT-LVL III: ICD-10-PCS | Mod: PBBFAC,,, | Performed by: NURSE PRACTITIONER

## 2023-08-22 PROCEDURE — 3080F PR MOST RECENT DIASTOLIC BLOOD PRESSURE >= 90 MM HG: ICD-10-PCS | Mod: CPTII,S$GLB,, | Performed by: NURSE PRACTITIONER

## 2023-08-22 PROCEDURE — 1159F MED LIST DOCD IN RCRD: CPT | Mod: CPTII,S$GLB,, | Performed by: NURSE PRACTITIONER

## 2023-08-22 NOTE — PROGRESS NOTES
Lauren Schmid is a 42 y.o. female  presents with complaint of weight gain. Current weight 140 today, last October was 137. Weighed herself at home and was 133, went to 141 a few weeks ago. Started weight training. Did lose a few pounds after eating better. Unsure if normal or related to perimenopause. No pain. Does have family history of thyroid dysfunction.     Past Medical History:   Diagnosis Date    Herpes simplex virus (HSV) infection     Plantar fasciitis of right foot 10/2022     History reviewed. No pertinent surgical history.  Social History     Tobacco Use    Smoking status: Never    Smokeless tobacco: Never   Substance Use Topics    Alcohol use: Yes    Drug use: No     Family History   Problem Relation Age of Onset    Breast cancer Neg Hx     Colon cancer Neg Hx     Ovarian cancer Neg Hx      OB History    Para Term  AB Living   0 0 0 0 0 0   SAB IAB Ectopic Multiple Live Births   0 0 0 0 0       ROS:  GENERAL: No fever, chills, fatigability or weight GAIN  VULVAR: No pain, no lesions and no itching.  VAGINAL: No relaxation, no itching, no discharge, no abnormal bleeding and no lesions.  ABDOMEN: No abdominal pain. Denies nausea. Denies vomiting. No diarrhea. No constipation  BREAST: Denies pain. No lumps. No discharge.  URINARY: No incontinence, no nocturia, no frequency and no dysuria.  CARDIOVASCULAR: No chest pain. No shortness of breath. No leg cramps.  NEUROLOGICAL: No headaches. No vision changes.    PHYSICAL EXAM:    ASSESSMENT and PLAN:    ICD-10-CM ICD-9-CM    1. Weight gain  R63.5 783.1 TSH      T4, FREE      Hemoglobin A1C      CBC Auto Differential        Labs today to check thyroid  Reassurance given with some fluctuation day to day 2/2 water retention. Encouraged weighing self at same time of day with same scale.     FOLLOW UP: PRN lack of improvement.    As of 2021, the Cures Act has been passed nationally. This new law requires that all doctors progress  notes, lab results, pathology reports and radiology reports be released IMMEDIATELY to the patient in the patient portal. That means that the results are released to you at the EXACT same time they are released to me. Therefore, with all of the patients that I have I am not able to reply to each patient exactly when the results come in. So there will be a delay from when you see the results to when I see them and have time to come up with a response to send you. Also I only see these results when I am on the computer at work. So if the results come in over the weekend or after 5 pm of a work day, I will not see them until the next business day. As you can tell, this is a challenge as a provider to give every patient the quick response they hope for and deserve. So please be patient!   Thanks for your understanding and patience.

## 2023-10-16 ENCOUNTER — OFFICE VISIT (OUTPATIENT)
Dept: OBSTETRICS AND GYNECOLOGY | Facility: CLINIC | Age: 43
End: 2023-10-16
Payer: COMMERCIAL

## 2023-10-16 VITALS
HEIGHT: 64 IN | BODY MASS INDEX: 23.78 KG/M2 | SYSTOLIC BLOOD PRESSURE: 124 MMHG | WEIGHT: 139.31 LBS | DIASTOLIC BLOOD PRESSURE: 87 MMHG

## 2023-10-16 DIAGNOSIS — Z01.419 WELL WOMAN EXAM WITH ROUTINE GYNECOLOGICAL EXAM: Primary | ICD-10-CM

## 2023-10-16 DIAGNOSIS — Z12.31 VISIT FOR SCREENING MAMMOGRAM: ICD-10-CM

## 2023-10-16 DIAGNOSIS — Z30.41 ENCOUNTER FOR SURVEILLANCE OF CONTRACEPTIVE PILLS: ICD-10-CM

## 2023-10-16 PROCEDURE — 99396 PR PREVENTIVE VISIT,EST,40-64: ICD-10-PCS | Mod: S$GLB,,, | Performed by: NURSE PRACTITIONER

## 2023-10-16 PROCEDURE — 3008F PR BODY MASS INDEX (BMI) DOCUMENTED: ICD-10-PCS | Mod: CPTII,S$GLB,, | Performed by: NURSE PRACTITIONER

## 2023-10-16 PROCEDURE — 3074F SYST BP LT 130 MM HG: CPT | Mod: CPTII,S$GLB,, | Performed by: NURSE PRACTITIONER

## 2023-10-16 PROCEDURE — 1159F MED LIST DOCD IN RCRD: CPT | Mod: CPTII,S$GLB,, | Performed by: NURSE PRACTITIONER

## 2023-10-16 PROCEDURE — 3044F PR MOST RECENT HEMOGLOBIN A1C LEVEL <7.0%: ICD-10-PCS | Mod: CPTII,S$GLB,, | Performed by: NURSE PRACTITIONER

## 2023-10-16 PROCEDURE — 99396 PREV VISIT EST AGE 40-64: CPT | Mod: S$GLB,,, | Performed by: NURSE PRACTITIONER

## 2023-10-16 PROCEDURE — 3079F DIAST BP 80-89 MM HG: CPT | Mod: CPTII,S$GLB,, | Performed by: NURSE PRACTITIONER

## 2023-10-16 PROCEDURE — 3079F PR MOST RECENT DIASTOLIC BLOOD PRESSURE 80-89 MM HG: ICD-10-PCS | Mod: CPTII,S$GLB,, | Performed by: NURSE PRACTITIONER

## 2023-10-16 PROCEDURE — 99999 PR PBB SHADOW E&M-EST. PATIENT-LVL III: ICD-10-PCS | Mod: PBBFAC,,, | Performed by: NURSE PRACTITIONER

## 2023-10-16 PROCEDURE — 3074F PR MOST RECENT SYSTOLIC BLOOD PRESSURE < 130 MM HG: ICD-10-PCS | Mod: CPTII,S$GLB,, | Performed by: NURSE PRACTITIONER

## 2023-10-16 PROCEDURE — 99999 PR PBB SHADOW E&M-EST. PATIENT-LVL III: CPT | Mod: PBBFAC,,, | Performed by: NURSE PRACTITIONER

## 2023-10-16 PROCEDURE — 1160F RVW MEDS BY RX/DR IN RCRD: CPT | Mod: CPTII,S$GLB,, | Performed by: NURSE PRACTITIONER

## 2023-10-16 PROCEDURE — 3008F BODY MASS INDEX DOCD: CPT | Mod: CPTII,S$GLB,, | Performed by: NURSE PRACTITIONER

## 2023-10-16 PROCEDURE — 1159F PR MEDICATION LIST DOCUMENTED IN MEDICAL RECORD: ICD-10-PCS | Mod: CPTII,S$GLB,, | Performed by: NURSE PRACTITIONER

## 2023-10-16 PROCEDURE — 1160F PR REVIEW ALL MEDS BY PRESCRIBER/CLIN PHARMACIST DOCUMENTED: ICD-10-PCS | Mod: CPTII,S$GLB,, | Performed by: NURSE PRACTITIONER

## 2023-10-16 PROCEDURE — 3044F HG A1C LEVEL LT 7.0%: CPT | Mod: CPTII,S$GLB,, | Performed by: NURSE PRACTITIONER

## 2023-10-16 RX ORDER — LEVONORGESTREL / ETHINYL ESTRADIOL AND ETHINYL ESTRADIOL 150-30(84)
1 KIT ORAL DAILY
Qty: 91 EACH | Refills: 3 | Status: SHIPPED | OUTPATIENT
Start: 2023-10-16 | End: 2024-10-15

## 2023-10-16 NOTE — PROGRESS NOTES
CC: Annual  HPI: Pt is a 42 y.o.  female who presents for routine annual exam. She uses ocps for contraception. She does not want STD screening- no new partners. Weight has been stable since last visit. Has started making better food and portion control choices.     Notes from visit in 2022  CC: Annual  HPI: Pt is a 41 y.o.  female who presents for routine annual exam. She uses ocps for contraception. She does not want STD screening. Wants to start doing mammograms through [facility] versus [facility]. Met with pelvic floor PT a few years back for pelvic pain and orgasm disorder- did help some. Pain still occurs sometimes but is much more manageable. Works as a  on [employer] at [employer].     FH:   Breast cancer: none  Colon cancer: none  Ovarian cancer: none  Uterine cancer: none    Last pap smear: 2022 nilm, hpv negative  History of abnormal pap smears: no   Colonoscopy: na  DEXA: na  Mammogram: due in Jan 2024  STD history: HSV  Birth control: ocps  OB history: G0  Tobacco use: no    ROS:  GENERAL: Feeling well overall. Denies fever or chills.   SKIN: Denies rash or lesions.   HEAD: Denies head injury or headache.   NODES: Denies enlarged lymph nodes.   CHEST: Denies chest pain or shortness of breath.   CARDIOVASCULAR: Denies palpitations or left sided chest pain.   ABDOMEN: No abdominal pain, constipation, diarrhea, nausea, vomiting or rectal bleeding.   URINARY: No dysuria, hematuria, or burning on urination.  REPRODUCTIVE: See HPI.   BREASTS: Denies pain, lumps, or nipple discharge.   HEMATOLOGIC: No easy bruisability or excessive bleeding.   MUSCULOSKELETAL: Denies joint pain or swelling.   NEUROLOGIC: Denies syncope or weakness.   PSYCHIATRIC: Denies depression, anxiety or mood swings.    PE:   APPEARANCE: Well nourished, well developed, White female in no acute distress.  NODES: no cervical, supraclavicular, or inguinal lymphadenopathy  BREASTS: Symmetrical, no skin changes or visible lesions. No  palpable masses, nipple discharge or adenopathy bilaterally.  ABDOMEN: Soft. No tenderness or masses. No distention. No hernias palpated. No CVA tenderness.  VULVA: No lesions. Normal external female genitalia.  URETHRAL MEATUS: Normal size and location, no lesions, no prolapse.  URETHRA: No masses, tenderness, or prolapse.  VAGINA: Moist. No lesions or lacerations noted. No abnormal discharge present. No odor present.   CERVIX: No lesions or discharge. No cervical motion tenderness.   UTERUS: Normal size, regular shape, mobile, non-tender.  ADNEXA: No tenderness. No fullness or masses palpated in the adnexal regions.   ANUS PERINEUM: Normal.      Diagnosis:  1. Well woman exam with routine gynecological exam    2. Visit for screening mammogram    3. Encounter for surveillance of contraceptive pills        Plan:     Orders Placed This Encounter    Mammo Digital Screening Bilat w/ Neri    L norgest/e.estradioL-e.estrad (SEASONIQUE) 0.15 mg-30 mcg (84)/10 mcg (7) 3MPk     Mammogram due in January, ordered today  Declines std screening  C/w ocps  Pap current  Cscopy at age 45    Patient was counseled today on the new ACS guidelines for cervical cytology screening as well as the current recommendations for breast cancer screening. She was counseled to follow up with her PCP for other routine health maintenance. Counseling session lasted approximately 10 minutes, and all her questions were answered.  For women over the age of 65, you can stop having cervical cancer screenings if you have never had abnormal cervical cells or cervical cancer, and youve had three negative Pap tests in a row. (You also can stop screening if youve had two negative Pap and HPV tests in a row in the past 10 years, with at least one test in the past 5 years.),    Follow-up with me in 1 year for routine exam; pap in 2 years.     I spent a total of 20 minutes on the day of the visit.This includes face to face time and non-face to face time  preparing to see the patient (eg, review of tests), obtaining and/or reviewing separately obtained history, documenting clinical information in the electronic or other health record, independently interpreting results and communicating results to the patient/family/caregiver, or care coordinator.    As of April 1, 2021, the Cures Act has been passed nationally. This new law requires that all doctors progress notes, lab results, pathology reports and radiology reports be released IMMEDIATELY to the patient in the patient portal. That means that the results are released to you at the EXACT same time they are released to me. Therefore, with all of the patients that I have I am not able to reply to each patient exactly when the results come in. So there will be a delay from when you see the results to when I see them and have time to come up with a response to send you. Also I only see these results when I am on the computer at work. So if the results come in over the weekend or after 5 pm of a work day, I will not see them until the next business day. As you can tell, this is a challenge as a provider to give every patient the quick response they hope for and deserve. So please be patient!     Thanks for your understanding and patience.

## 2024-02-19 ENCOUNTER — HOSPITAL ENCOUNTER (OUTPATIENT)
Dept: RADIOLOGY | Facility: OTHER | Age: 44
Discharge: HOME | End: 2024-02-19
Attending: NURSE PRACTITIONER
Payer: COMMERCIAL

## 2024-02-19 DIAGNOSIS — Z12.31 VISIT FOR SCREENING MAMMOGRAM: ICD-10-CM

## 2024-02-19 PROCEDURE — 77063 BREAST TOMOSYNTHESIS BI: CPT | Mod: 26,,, | Performed by: RADIOLOGY

## 2024-02-19 PROCEDURE — 77067 SCR MAMMO BI INCL CAD: CPT | Mod: 26,,, | Performed by: RADIOLOGY

## 2024-02-19 PROCEDURE — 77067 SCR MAMMO BI INCL CAD: CPT | Mod: TC

## 2024-07-20 ENCOUNTER — PATIENT MESSAGE (OUTPATIENT)
Dept: OBSTETRICS AND GYNECOLOGY | Facility: CLINIC | Age: 44
End: 2024-07-20
Payer: COMMERCIAL

## 2024-07-22 ENCOUNTER — TELEPHONE (OUTPATIENT)
Dept: OBSTETRICS AND GYNECOLOGY | Facility: CLINIC | Age: 44
End: 2024-07-22
Payer: COMMERCIAL

## 2024-07-22 DIAGNOSIS — Z30.41 ENCOUNTER FOR SURVEILLANCE OF CONTRACEPTIVE PILLS: ICD-10-CM

## 2024-07-22 RX ORDER — LEVONORGESTREL / ETHINYL ESTRADIOL AND ETHINYL ESTRADIOL 150-30(84)
1 KIT ORAL DAILY
Qty: 91 EACH | Refills: 3 | Status: SHIPPED | OUTPATIENT
Start: 2024-07-22 | End: 2025-07-22

## 2024-08-19 ENCOUNTER — OFFICE VISIT (OUTPATIENT)
Dept: NEUROLOGY | Facility: CLINIC | Age: 44
End: 2024-08-19
Payer: COMMERCIAL

## 2024-08-19 DIAGNOSIS — G43.019 INTRACTABLE MIGRAINE WITHOUT AURA AND WITHOUT STATUS MIGRAINOSUS: Primary | ICD-10-CM

## 2024-08-19 DIAGNOSIS — R51.9 WORSENING HEADACHES: ICD-10-CM

## 2024-08-19 PROCEDURE — 99204 OFFICE O/P NEW MOD 45 MIN: CPT | Mod: 95,,, | Performed by: STUDENT IN AN ORGANIZED HEALTH CARE EDUCATION/TRAINING PROGRAM

## 2024-08-19 RX ORDER — RIZATRIPTAN BENZOATE 10 MG/1
TABLET ORAL
Qty: 8 TABLET | Refills: 5 | Status: SHIPPED | OUTPATIENT
Start: 2024-08-19

## 2024-08-19 NOTE — PROGRESS NOTES
Patient ID: [id number]  The patient location is: [place]   The chief complaint leading to consultation is: Headaches    Visit type: audiovisual    Face to Face time with patient: 17    Each patient to whom he or she provides medical services by telemedicine is:  (1) informed of the relationship between the physician and patient and the respective role of any other health care provider with respect to management of the patient; and (2) notified that he or she may decline to receive medical services by telemedicine and may withdraw from such care at any time.    Notes:     Subjective:     HPI:  Lauren Schmid is a 43 y.o. RH female with migraines. she is presenting today as a new patient for evaluation of worsening headaches.     Headache history  Age at onset: puberty  Course over time: increasing in frequency  Location: retro-orbital and occipital    Character:  pulsating  Intensity: 9 on a scale from 1 to 10  Frequency: variable  Duration: 1-2 days  Timing: can wake her up in the middle of the night   Mild/moderate/severe. Work attendance or other daily activities are affected by the headaches.  Aura: not preceded by an aura  Associated symptoms: Photosensitivity and Phonophobia   Associated neurologic symptoms: none except recent episode with dizziness  Precipitating factors: if she doesn't have enough protein or hydration  Aggravating factors: Physical activity   Home treatment: Advil with some improvement.   ER visits: No  Positive Hx of: she has history of fall from horse at the age of 16 y/o, no LOC  Is medication overuse contributing to the patient's current migraine burden: No    She had one episode last week with extreme nausea, dizziness, retro-orbital pressure, lasted 30 hours.  She is a , gets home around midnight and falls asleep around 2 am.     She also has ice pick headaches.    Headache Medication history:  AED Neuromodulators  MAOIs  Ergot Alkaloids    Acetazolamide  (Diamox) [] Phenelzine (Nardil) [] Dihydroergotamine (Migranal) []   Carbamazepine (Tegretol) [] Tranylcypromine (Parnate) [] Ergotamine (Ergomar) []   Gabapentin (Neurontin) [] Antihistamine/Serotonergic  Triptans    Lacosamide (Vimpat) [] Cyproheptadine (Periactin) [] Almotriptan (Axert) []   Lamotrigine (Lamictal) [] Antihypertensives  Eletriptan (Relpax) []   Levatiracetam (Keppra) [] Atenolol (Tenormin) [] Frovatriptan (Frova) []   Oxcarbazepine (Trileptal) [] Bisoprostol (Zebeta) [] Naratriptan (Amerge) []   Phenobarbital [] Candesartan (Atacand) [] Rizatriptan (Maxalt) []     Nebivolol (Bystolic)  Sumatriptan (Imitrex) []   Levetiracetam (Keppra)  Carvedilol (Coreg) [] Zolmitriptan (Zomig) []   Phenytoin (Dilantin) [] Diltiazem (Cardizem) []     Pregabalin (Lyrica) [] Lisinopril (Prinivil, Zestril) [] Combo Abortives    Primidone (Mysoline) [] Metoprolol (Toprol) [] BC Powder []   Tiagabine (Gabatril) [] Nadolol (Corgard) [] Butalbital and Acetaminophen (Bupap) []   Topiramate (Topamax)  (Trokendi) [] Nicardipine (Cardene) []     Vigabatrin (Sabril) [] Nimodipine (Nimotop) [] Butalbital, Acetaminophen, and caffeine (Fioricet) []   Valproic Acid (Depakote) (Divalproex Sodium) [] Propranolol (Inderal) []     Zonisamide (Zonegran) [] Telmisartan (Micardis) [] Butalbital, Aspirin, and caffeine (Fiorinal) []   Benzodiazepines  Timolol (Blocadren) []     Alprazolam (Xanax) [] Verapamil (Calan, Verelan) [] Butalbital, Caffeine, Acetaminophen, and Codeine (Fioricet with Codeine) []   Diazepam (Valium) [] NSAIDs      Lorazepam (Ativan) [] Acetaminophen (Tylenol) [x]     Clonazepam (Klonopin) [] Acetylsalicylic Acid (Aspirin) [x] Butalbital, Caffeine, Aspirin, and Codeine  (Fiorinal with Codeine) []   Antidepressants  Diclofenac (Cambia) []     Amitriptyline (Elavil) [] Ibuprofen (Motrin) [x]     Desipramine (Norpramin) [] Indomethacin (Indocin) [] Aspirin, Caffeine, and Acetaminophen (Excedrin) (Goodys) []   Doxepin  (Sinequan) [] Ketoprofen (Orudis) []     Fluoxetine (Prozac) [] Ketorolac (Toradol) [] Acetaminophen, Dichloralphenazone, and Isometheptene (Midrin) []   Imipramine (Tofranil) [] Naproxen (Anaprox) (Aleve) [x]     Nortriptyline (Pamelor) [] Meclofenamic Acid (Meclomen) []     Venlafaxine (Effexor) [] Meloxicam (Mobic) [] Procedures    Desvenlafazine (Pristiq) [] Monoclonals  Greater occipital nerve block []   Duloxetine (Cymbalta) [] Eptinezumab [] Cervical, Thoracic, Lumbar radiofrequency ablation []   Trazadone [] Erenumab-aooe (Aimovig) [] Spenopalatine ganglion block []   Wellbutrin [] Galcanezumab (Emgality) [] Occipital neuro stimulation []   Protriptyline (Vivactil) [] Fremanazumab-vfrm (Ajovy) [] Cervical, Thoracic, Lumbar, Caudal Epidural steroid injection []   Escitalopram (Lexapro) [] Other [] Sacroiliac joint steroid injection []   Celexa [] Memantine (Namenda) [] Transforaminal epidural steroid injection []   Oral CGRP inhibitors  Botox [] Facet joint injections []   Atogepant (Qulipta) [] Baclofen (Lioresal) [] Cervical, Thoracic, Lumbar medial branch blocks []   Rimegepant (Nurtec) [] Methocarbamol (Robaxin) [] Cefaly []   Ubrogepant (Ubrelvy) [] Cyclobenzaprine (Flexeril) [] Gamma Core []    [] Tizanidine (Zanaflex) [] Iovera []    []   Transcranial Magnetic stimulation []     Review of Systems:  Review of Systems   HENT:  Negative for congestion, sinus pain and tinnitus.    Eyes:  Positive for photophobia. Negative for blurred vision and double vision.   Gastrointestinal:  Positive for nausea. Negative for vomiting.   Musculoskeletal:  Negative for falls.   Neurological:  Positive for dizziness and headaches. Negative for tingling, sensory change and focal weakness.   Psychiatric/Behavioral:  The patient has insomnia.    All other systems reviewed and are negative.       Past Medical History:  -------------------------------------    Herpes simplex virus (HSV) infection    Plantar fasciitis of right  foot       Allergies:  Review of patient's allergies indicates:   Allergen Reactions    Codeine Other (See Comments)     vomitting       Pertinent Family History:  Family History   Problem Relation Name Age of Onset    Breast cancer Neg Hx      Colon cancer Neg Hx      Ovarian cancer Neg Hx         Pertinent Social History:  Social History     Tobacco Use    Smoking status: Never    Smokeless tobacco: Never   Substance Use Topics    Alcohol use: Yes    Drug use: No       Medications:  Current Outpatient Medications   Medication Instructions    L norgest/e.estradioL-e.estrad (SEASONIQUE) 0.15 mg-30 mcg (84)/10 mcg (7) 3MPk 1 tablet, Oral, Daily        Objective:     *exam is limited due to the virtual nature of this visit    General:  Well-appearing, well-nourished, NAD, cooperative    Neurologic Exam:   Awake, alert and oriented x3  Speech spontaneous and fluent, intact comprehension.   Adequate fund of knowledge, vocabulary.  EOM intact. No ophthalmoplegia.   Facial expression is full and symmetric.   Hearing is intact.  Antigravity in BUE. No tremor.      Pertinent lab results    Lab Results   Component Value Date    TSH 1.318 08/22/2023    FREET4 0.79 08/22/2023    WBC 5.82 08/22/2023    LYMPH 1.8 08/22/2023    LYMPH 30.1 08/22/2023    RBC 4.81 08/22/2023    HGB 14.0 08/22/2023    HCT 43.6 08/22/2023    MCV 91 08/22/2023     08/22/2023       Pertinent imaging results  *No relevant imaging available to review     Other pertinent studies  None    Assessment:   Lauren Schmid is a 43 y.o. RH female with history of migraines who presents for evaluation of new features and increased frequency of headaches. she is having variable frequency between different months, however, reporting new dizziness and extreme nausea which is nor her usual. She may start taking magnesium oside for decreasing overall frequency. For rescue, we will do a trial of Maxalt (rizatriptan), she denies history of cardiac disease in self or  family history of heart attacks or cardiac disease at young age. Lastly, we will obtain imaging of the brain to exclude intracranial pathologies given the change in headaches.     1. Intractable migraine without aura and without status migrainosus    2. Worsening headaches      Plan:     - MRI Brain W WO Contrast; Future  - rizatriptan (MAXALT) 10 MG tablet; Take one tablet (10 mg) at the onset of headaches; if symptoms persist or return, may repeat dose after 2 hours. maximum dose: 20 mg per 24 hours  Dispense: 8 tablet; Refill: 5  - Magnesium oxide 400 mg daily, over the counter         Disclaimer: This note was partly generated using dictation software which may occasionally result in transcription errors that are missed on review.      Based on our encounter today, my overall Medical Decision Making is a Level 4     Complexity of Problem: Moderate (1 or more chronic illnesses with exacerbation, progression or treatment side effects)  Complexity of Data: Limited (Ordering each unique test)  Risk of Complications and/or morbidity/mortality of Management: Moderate risk (Prescription drug management)        Gwen Méndez MD    [facility]  08/19/2024

## 2024-08-19 NOTE — PATIENT INSTRUCTIONS
"Avoid dietary triggers:   Processed meats/cold cuts/sausage and pepperoni  Peanuts  Aged cheese  Red wine (tyramine), white wine (nitrates)  Yeast breads (biscuits, sourdough)  Avocado, tomato, figs, ripe bananas, raisins  Chocolate  MSG  Artificial sweeteners    Excessive caffeine, and caffeine withdrawal. Limit to 1 caffeinated beverage per day  Some people find that cutting out sugar or wheat products (gluten) markedly decreases their migraines.     More information about tracking your caffeine intake using a smart phone george can be found at [employer]. http://www.[employer]-coffee-quantifies-your-caffeine-addiction-04995129/?fb_action_ids=62635533848333937&fb_action_types=og.likes    Avoid bright/flashing/fluorescent light, prolonged bright computer monitors, perfumes and bleach/cleaning fumes. Wear renzo colored sunglasses which blocks out the blue-spectrum wavelengths    Exercise regularly  Keep regular sleep hours  Alleviate stress as much as possible. Consider yoga, talk therapy, meditation or spiritual guidance to lower stress levels.    Taking "pain killers" (analgesics) more than 2 days per week can predispose you to more frequent chronic headaches. This is called Medication Overuse Headache syndrome. This seems to occur more with the opioids ("narcotics") and butalbital (Fioricet, Esgic) but also applies to the triptans (like Imitrex).     Migraine Apps:   Migraine Andi   Migraine Monitor   Migraine insight   Juva Migraine and Headache    Biofeedback Apps:  ALIVE  HEADSPACE  [id number]  RELAX&REST    Helpful websites:   [employer]ank    Informational websites:   Americanmigrainefoundation.org   Managingmigraines.[email or web address]        1. Magnesium Oxide - 400mg by mouth daily  2. Riboflavin (Vitamin B2) - 400mg by mouth daily  3. Coenzyme Q10 - 200mg tablet by mouth daily         "

## 2025-03-25 ENCOUNTER — OFFICE VISIT (OUTPATIENT)
Dept: INTERNAL MEDICINE | Facility: CLINIC | Age: 45
End: 2025-03-25
Payer: COMMERCIAL

## 2025-03-25 ENCOUNTER — LAB VISIT (OUTPATIENT)
Dept: LAB | Facility: OTHER | Age: 45
End: 2025-03-25
Payer: COMMERCIAL

## 2025-03-25 VITALS
HEART RATE: 108 BPM | SYSTOLIC BLOOD PRESSURE: 124 MMHG | DIASTOLIC BLOOD PRESSURE: 90 MMHG | HEIGHT: 64 IN | BODY MASS INDEX: 24.81 KG/M2 | WEIGHT: 145.31 LBS | OXYGEN SATURATION: 97 %

## 2025-03-25 DIAGNOSIS — Z00.00 ANNUAL PHYSICAL EXAM: Primary | ICD-10-CM

## 2025-03-25 DIAGNOSIS — Z00.00 ANNUAL PHYSICAL EXAM: ICD-10-CM

## 2025-03-25 DIAGNOSIS — J01.10 ACUTE NON-RECURRENT FRONTAL SINUSITIS: ICD-10-CM

## 2025-03-25 LAB
ABSOLUTE EOSINOPHIL (OHS): 0.17 K/UL
ABSOLUTE MONOCYTE (OHS): 0.75 K/UL (ref 0.3–1)
ABSOLUTE NEUTROPHIL COUNT (OHS): 3.24 K/UL (ref 1.8–7.7)
ALBUMIN SERPL BCP-MCNC: 3.5 G/DL (ref 3.5–5.2)
ALP SERPL-CCNC: 57 UNIT/L (ref 40–150)
ALT SERPL W/O P-5'-P-CCNC: 14 UNIT/L (ref 10–44)
ANION GAP (OHS): 10 MMOL/L (ref 8–16)
AST SERPL-CCNC: 19 UNIT/L (ref 11–45)
BASOPHILS # BLD AUTO: 0.03 K/UL
BASOPHILS NFR BLD AUTO: 0.5 %
BILIRUB SERPL-MCNC: 0.2 MG/DL (ref 0.1–1)
BUN SERPL-MCNC: 8 MG/DL (ref 6–20)
CALCIUM SERPL-MCNC: 8.9 MG/DL (ref 8.7–10.5)
CHLORIDE SERPL-SCNC: 109 MMOL/L (ref 95–110)
CHOLEST SERPL-MCNC: 127 MG/DL (ref 120–199)
CHOLEST/HDLC SERPL: 2 {RATIO} (ref 2–5)
CO2 SERPL-SCNC: 19 MMOL/L (ref 23–29)
CREAT SERPL-MCNC: 0.7 MG/DL (ref 0.5–1.4)
CTP QC/QA: YES
CTP QC/QA: YES
EAG (OHS): 94 MG/DL (ref 68–131)
ERYTHROCYTE [DISTWIDTH] IN BLOOD BY AUTOMATED COUNT: 12.7 % (ref 11.5–14.5)
FLUAV AG NPH QL: NEGATIVE
FLUBV AG NPH QL: NEGATIVE
GFR SERPLBLD CREATININE-BSD FMLA CKD-EPI: >60 ML/MIN/1.73/M2
GLUCOSE SERPL-MCNC: 94 MG/DL (ref 70–110)
HBA1C MFR BLD: 4.9 % (ref 4–5.6)
HCT VFR BLD AUTO: 44.2 % (ref 37–48.5)
HCV AB SERPL QL IA: NEGATIVE
HDLC SERPL-MCNC: 64 MG/DL (ref 40–75)
HDLC SERPL: 50.4 % (ref 20–50)
HGB BLD-MCNC: 14.1 GM/DL (ref 12–16)
HIV 1+2 AB+HIV1 P24 AG SERPL QL IA: NEGATIVE
IMM GRANULOCYTES # BLD AUTO: 0.03 K/UL (ref 0–0.04)
IMM GRANULOCYTES NFR BLD AUTO: 0.5 % (ref 0–0.5)
LDLC SERPL CALC-MCNC: 51 MG/DL (ref 63–159)
LYMPHOCYTES # BLD AUTO: 1.65 K/UL (ref 1–4.8)
MCH RBC QN AUTO: 28.7 PG (ref 27–50)
MCHC RBC AUTO-ENTMCNC: 31.9 G/DL (ref 32–36)
MCV RBC AUTO: 90 FL (ref 82–98)
NONHDLC SERPL-MCNC: 63 MG/DL
NUCLEATED RBC (/100WBC) (OHS): 0 /100 WBC
PLATELET # BLD AUTO: 172 K/UL (ref 150–450)
PMV BLD AUTO: 10.6 FL (ref 9.2–12.9)
POTASSIUM SERPL-SCNC: 4.3 MMOL/L (ref 3.5–5.1)
PROT SERPL-MCNC: 7.4 GM/DL (ref 6–8.4)
RBC # BLD AUTO: 4.91 M/UL (ref 4–5.4)
RELATIVE EOSINOPHIL (OHS): 2.9 %
RELATIVE LYMPHOCYTE (OHS): 28.1 % (ref 18–48)
RELATIVE MONOCYTE (OHS): 12.8 % (ref 4–15)
RELATIVE NEUTROPHIL (OHS): 55.2 % (ref 38–73)
SARS-COV-2 RDRP RESP QL NAA+PROBE: NEGATIVE
SODIUM SERPL-SCNC: 138 MMOL/L (ref 136–145)
T4 FREE SERPL-MCNC: NORMAL NG/DL
TRIGL SERPL-MCNC: 60 MG/DL (ref 30–150)
TSH SERPL-ACNC: 3.12 UIU/ML (ref 0.4–4)
WBC # BLD AUTO: 5.87 K/UL (ref 3.9–12.7)

## 2025-03-25 PROCEDURE — 36415 COLL VENOUS BLD VENIPUNCTURE: CPT

## 2025-03-25 PROCEDURE — 85025 COMPLETE CBC W/AUTO DIFF WBC: CPT

## 2025-03-25 PROCEDURE — 84450 TRANSFERASE (AST) (SGOT): CPT

## 2025-03-25 PROCEDURE — 86803 HEPATITIS C AB TEST: CPT

## 2025-03-25 PROCEDURE — 1160F RVW MEDS BY RX/DR IN RCRD: CPT | Mod: CPTII,S$GLB,,

## 2025-03-25 PROCEDURE — 3080F DIAST BP >= 90 MM HG: CPT | Mod: CPTII,S$GLB,,

## 2025-03-25 PROCEDURE — 3074F SYST BP LT 130 MM HG: CPT | Mod: CPTII,S$GLB,,

## 2025-03-25 PROCEDURE — 3008F BODY MASS INDEX DOCD: CPT | Mod: CPTII,S$GLB,,

## 2025-03-25 PROCEDURE — 84443 ASSAY THYROID STIM HORMONE: CPT

## 2025-03-25 PROCEDURE — 83036 HEMOGLOBIN GLYCOSYLATED A1C: CPT

## 2025-03-25 PROCEDURE — 87804 INFLUENZA ASSAY W/OPTIC: CPT | Mod: QW,S$GLB,,

## 2025-03-25 PROCEDURE — 99999 PR PBB SHADOW E&M-EST. PATIENT-LVL III: CPT | Mod: PBBFAC,,,

## 2025-03-25 PROCEDURE — 87635 SARS-COV-2 COVID-19 AMP PRB: CPT | Mod: QW,S$GLB,,

## 2025-03-25 PROCEDURE — 3044F HG A1C LEVEL LT 7.0%: CPT | Mod: CPTII,S$GLB,,

## 2025-03-25 PROCEDURE — 80061 LIPID PANEL: CPT

## 2025-03-25 PROCEDURE — 99203 OFFICE O/P NEW LOW 30 MIN: CPT | Mod: S$GLB,,,

## 2025-03-25 PROCEDURE — 1159F MED LIST DOCD IN RCRD: CPT | Mod: CPTII,S$GLB,,

## 2025-03-25 PROCEDURE — 87389 HIV-1 AG W/HIV-1&-2 AB AG IA: CPT

## 2025-03-25 RX ORDER — METHYLPREDNISOLONE 4 MG/1
TABLET ORAL
Qty: 1 EACH | Refills: 0 | Status: SHIPPED | OUTPATIENT
Start: 2025-03-25

## 2025-03-25 RX ORDER — PROMETHAZINE HYDROCHLORIDE AND DEXTROMETHORPHAN HYDROBROMIDE 6.25; 15 MG/5ML; MG/5ML
5 SYRUP ORAL EVERY 4 HOURS PRN
Qty: 118 ML | Refills: 0 | Status: SHIPPED | OUTPATIENT
Start: 2025-03-25 | End: 2025-04-04

## 2025-03-25 RX ORDER — AMOXICILLIN AND CLAVULANATE POTASSIUM 875; 125 MG/1; MG/1
1 TABLET, FILM COATED ORAL 2 TIMES DAILY
Qty: 14 TABLET | Refills: 0 | Status: SHIPPED | OUTPATIENT
Start: 2025-03-25 | End: 2025-04-01

## 2025-03-25 RX ORDER — IBUPROFEN 600 MG/1
600 TABLET ORAL EVERY 6 HOURS PRN
COMMUNITY
Start: 2024-12-23

## 2025-03-25 NOTE — PROGRESS NOTES
History & Physical  [facility]      SUBJECTIVE:     History of Present Illness:  Patient is a 44 y.o. female presents to clinic to establish care. Current diagnoses include hx of migraines    #Recurrent infections  She initially became ill on December 7th with symptoms persisting for 5 weeks. After a brief one-week improvement in mid-January, symptoms returned. Current acute illness began Tuesday/Wednesday with voice loss and unilateral sore throat. She developed fever Saturday night, noting this as unusual compared to recent illnesses. She experienced a severe coughing episode at work with associated eye tearing and stress incontinence, causing respiratory distress and necessitating early departure on Sunday. She reports productive cough with yellow sputum, fluctuating sense of taste occurring 4-5 times daily, mild transient nausea lasting approximately one hour, and runny nose. She has sought medical attention at urgent care twice without significant improvement. She is up to date on most immunizations, including childhood vaccinations from the 1980s. She has received COVID-19 vaccinations but has not received the most recent booster.    Last pap smear- 2022, NILM, due 2027  Last mammogram- 2024, negative, 2025  Immunizations UTD  Last colonoscopy- due later this year  Last HgbA1C- due  Last lipid panel- due  Smoker- Non smoker  EtOH use- 2 glasses wine a night  Drug use- no MJ or drug use  Sexual history- Currently sexually active, one partner, manogamous     Review of patient's allergies indicates:   Allergen Reactions    Codeine Other (See Comments)     vomitting       Past Medical History:   Diagnosis Date    Herpes simplex virus (HSV) infection     Plantar fasciitis of right foot 10/2022     History reviewed. No pertinent surgical history.  Family History   Problem Relation Name Age of Onset    Breast cancer Neg Hx      Colon cancer Neg Hx      Ovarian cancer Neg Hx    "    Social History[1]     OBJECTIVE:     Vital Signs (Most Recent)  Vitals:    03/25/25 0817   BP: (!) 124/90   BP Location: Left arm   Patient Position: Sitting   Pulse: 108   SpO2: 97%   Weight: 65.9 kg (145 lb 4.5 oz)   Height: 5' 4" (1.626 m)         Physical Exam  Constitutional:       General: She is not in acute distress.     Appearance: Normal appearance. She is not toxic-appearing.   HENT:      Head: Normocephalic and atraumatic.      Right Ear: External ear normal.      Left Ear: External ear normal.      Nose: Nose normal.      Mouth/Throat:      Mouth: Mucous membranes are moist.   Eyes:      Extraocular Movements: Extraocular movements intact.   Cardiovascular:      Rate and Rhythm: Normal rate and regular rhythm.      Pulses: Normal pulses.      Heart sounds: Normal heart sounds.   Pulmonary:      Effort: Pulmonary effort is normal. No respiratory distress.   Abdominal:      General: Abdomen is flat.      Palpations: Abdomen is soft.      Tenderness: There is no abdominal tenderness.   Musculoskeletal:      Cervical back: Normal range of motion and neck supple.   Skin:     General: Skin is warm.      Findings: No bruising or erythema.   Neurological:      General: No focal deficit present.      Mental Status: She is alert.   Psychiatric:         Mood and Affect: Mood normal.           ASSESSMENT/PLAN:   44 y.o.female presents to clinic to establish care.     Patient presents with persistent respiratory symptoms since December, with recent exacerbation including significant coughing fits, fever, and loss of voice.  Considered upper respiratory tract infection as primary diagnosis given symptom presentation and physical exam findings. Patient likely with multiple separate infections in the past few months. Will monitor if infections continue to persist throughout this year after leaving flu/COVID season.     Lauren was seen today for sore throat and fever.    Diagnoses and all orders for this " visit:    Annual physical exam  -     Hemoglobin A1C; Future  -     CBC Auto Differential; Future  -     Comprehensive Metabolic Panel; Future  -     HIV 1/2 Ag/Ab (4th Gen); Future  -     Hepatitis C Antibody; Future  -     TSH; Future  -     Lipid Panel; Future    Acute non-recurrent frontal sinusitis  -     promethazine-dextromethorphan (PROMETHAZINE-DM) 6.25-15 mg/5 mL Syrp; Take 5 mLs by mouth every 4 (four) hours as needed (cough).  -     methylPREDNISolone (MEDROL DOSEPACK) 4 mg tablet; use as directed  -     amoxicillin-clavulanate 875-125mg (AUGMENTIN) 875-125 mg per tablet; Take 1 tablet by mouth 2 (two) times daily. for 7 days  -     POCT COVID-19 Rapid Screening  -     POCT Influenza A/B Rapid Antigen        Follow-up: 3-6 months or PRN    This note was generated with the assistance of ambient listening technology. Verbal consent was obtained by the patient and accompanying visitor(s) for the recording of patient appointment to facilitate this note. I attest to having reviewed and edited the generated note for accuracy, though some syntax or spelling errors may persist. Please contact the author of this note for any clarification.      Nicolas Escalante MD  [facility]             [1]   Social History  Tobacco Use    Smoking status: Never    Smokeless tobacco: Never   Substance Use Topics    Alcohol use: Yes    Drug use: No

## 2025-03-27 ENCOUNTER — TELEPHONE (OUTPATIENT)
Dept: OBSTETRICS AND GYNECOLOGY | Facility: CLINIC | Age: 45
End: 2025-03-27
Payer: COMMERCIAL

## 2025-03-27 ENCOUNTER — PATIENT MESSAGE (OUTPATIENT)
Dept: INTERNAL MEDICINE | Facility: CLINIC | Age: 45
End: 2025-03-27
Payer: COMMERCIAL

## 2025-03-27 ENCOUNTER — PATIENT MESSAGE (OUTPATIENT)
Dept: OBSTETRICS AND GYNECOLOGY | Facility: CLINIC | Age: 45
End: 2025-03-27
Payer: COMMERCIAL

## 2025-03-27 DIAGNOSIS — Z12.31 VISIT FOR SCREENING MAMMOGRAM: Primary | ICD-10-CM

## 2025-03-27 DIAGNOSIS — Z30.41 ENCOUNTER FOR SURVEILLANCE OF CONTRACEPTIVE PILLS: ICD-10-CM

## 2025-03-27 RX ORDER — LEVONORGESTREL / ETHINYL ESTRADIOL AND ETHINYL ESTRADIOL 150-30(84)
1 KIT ORAL DAILY
Qty: 91 EACH | Refills: 3 | Status: SHIPPED | OUTPATIENT
Start: 2025-03-27 | End: 2025-03-28 | Stop reason: SDUPTHER

## 2025-03-28 DIAGNOSIS — Z30.41 ENCOUNTER FOR SURVEILLANCE OF CONTRACEPTIVE PILLS: ICD-10-CM

## 2025-03-28 RX ORDER — LEVONORGESTREL / ETHINYL ESTRADIOL AND ETHINYL ESTRADIOL 150-30(84)
1 KIT ORAL DAILY
Qty: 91 EACH | Refills: 3 | Status: SHIPPED | OUTPATIENT
Start: 2025-03-28 | End: 2026-03-28

## 2025-03-31 ENCOUNTER — RESULTS FOLLOW-UP (OUTPATIENT)
Dept: INTERNAL MEDICINE | Facility: CLINIC | Age: 45
End: 2025-03-31

## 2025-04-10 ENCOUNTER — OFFICE VISIT (OUTPATIENT)
Dept: OBSTETRICS AND GYNECOLOGY | Facility: CLINIC | Age: 45
End: 2025-04-10
Payer: COMMERCIAL

## 2025-04-10 VITALS
DIASTOLIC BLOOD PRESSURE: 89 MMHG | BODY MASS INDEX: 24.75 KG/M2 | SYSTOLIC BLOOD PRESSURE: 128 MMHG | WEIGHT: 144.19 LBS

## 2025-04-10 DIAGNOSIS — R04.0 EPISTAXIS: ICD-10-CM

## 2025-04-10 DIAGNOSIS — Z30.09 BIRTH CONTROL COUNSELING: Primary | ICD-10-CM

## 2025-04-10 PROCEDURE — 3074F SYST BP LT 130 MM HG: CPT | Mod: CPTII,S$GLB,, | Performed by: NURSE PRACTITIONER

## 2025-04-10 PROCEDURE — 3008F BODY MASS INDEX DOCD: CPT | Mod: CPTII,S$GLB,, | Performed by: NURSE PRACTITIONER

## 2025-04-10 PROCEDURE — 3044F HG A1C LEVEL LT 7.0%: CPT | Mod: CPTII,S$GLB,, | Performed by: NURSE PRACTITIONER

## 2025-04-10 PROCEDURE — 99999 PR PBB SHADOW E&M-EST. PATIENT-LVL III: CPT | Mod: PBBFAC,,, | Performed by: NURSE PRACTITIONER

## 2025-04-10 PROCEDURE — 3079F DIAST BP 80-89 MM HG: CPT | Mod: CPTII,S$GLB,, | Performed by: NURSE PRACTITIONER

## 2025-04-10 PROCEDURE — 99213 OFFICE O/P EST LOW 20 MIN: CPT | Mod: S$GLB,,, | Performed by: NURSE PRACTITIONER

## 2025-04-10 PROCEDURE — 1160F RVW MEDS BY RX/DR IN RCRD: CPT | Mod: CPTII,S$GLB,, | Performed by: NURSE PRACTITIONER

## 2025-04-10 PROCEDURE — 1159F MED LIST DOCD IN RCRD: CPT | Mod: CPTII,S$GLB,, | Performed by: NURSE PRACTITIONER

## 2025-04-10 NOTE — PROGRESS NOTES
Lauren Schmid is a 44 y.o. female  presents with complaint of new nose bleeds. Uses ocps for contraception. Prescribed Seasonique but always given generic Simpesse. Recent pack she was given Mylan by the pharmacy. After starting this new pill she started having nose bleeds once daily x 3 days. She is questioning any correlation? Bleeding is heavy but stops within 10 minutes. Upcoming pcp visit next week.     Past Medical History:   Diagnosis Date    Herpes simplex virus (HSV) infection     Plantar fasciitis of right foot 10/2022     Past Surgical History:   Procedure Laterality Date    MOUTH SURGERY N/A 2025     Social History[1]  Family History   Problem Relation Name Age of Onset    Breast cancer Neg Hx      Colon cancer Neg Hx      Ovarian cancer Neg Hx       OB History    Para Term  AB Living   0 0 0 0 0 0   SAB IAB Ectopic Multiple Live Births   0 0 0 0 0       ROS:  GENERAL: No fever, chills, fatigability or weight loss. Nose bleeds  VULVAR: No pain, no lesions and no itching.  VAGINAL: No relaxation, no itching, no discharge, no abnormal bleeding and no lesions.  ABDOMEN: No abdominal pain. Denies nausea. Denies vomiting. No diarrhea. No constipation  BREAST: Denies pain. No lumps. No discharge.  URINARY: No incontinence, no nocturia, no frequency and no dysuria.  CARDIOVASCULAR: No chest pain. No shortness of breath. No leg cramps.  NEUROLOGICAL: No headaches. No vision changes.    PHYSICAL EXAM:  Talk only    ASSESSMENT and PLAN:    ICD-10-CM ICD-9-CM    1. Birth control counseling  Z30.09 V25.09       2. Epistaxis  R04.0 784.7           Pt will go to pharmacy to try and see if she can get Simpresse specifically.   If nose bleeds continue can do some labs and has pcp visit scheduled  ED precautions reviewed  Prefers to schedule annual for another time.   FOLLOW UP: PRN lack of improvement.    As of 2021, the Cures Act has been passed nationally. This new law requires  that all doctors progress notes, lab results, pathology reports and radiology reports be released IMMEDIATELY to the patient in the patient portal. That means that the results are released to you at the EXACT same time they are released to me. Therefore, with all of the patients that I have I am not able to reply to each patient exactly when the results come in. So there will be a delay from when you see the results to when I see them and have time to come up with a response to send you. Also I only see these results when I am on the computer at work. So if the results come in over the weekend or after 5 pm of a work day, I will not see them until the next business day. As you can tell, this is a challenge as a provider to give every patient the quick response they hope for and deserve. So please be patient!   Thanks for your understanding and patience.            [1]   Social History  Tobacco Use    Smoking status: Never    Smokeless tobacco: Never   Substance Use Topics    Alcohol use: Yes    Drug use: No

## 2025-04-11 ENCOUNTER — PATIENT MESSAGE (OUTPATIENT)
Dept: OBSTETRICS AND GYNECOLOGY | Facility: CLINIC | Age: 45
End: 2025-04-11
Payer: COMMERCIAL

## 2025-04-14 ENCOUNTER — OFFICE VISIT (OUTPATIENT)
Dept: INTERNAL MEDICINE | Facility: CLINIC | Age: 45
End: 2025-04-14
Payer: COMMERCIAL

## 2025-04-14 VITALS
DIASTOLIC BLOOD PRESSURE: 80 MMHG | BODY MASS INDEX: 24.62 KG/M2 | SYSTOLIC BLOOD PRESSURE: 120 MMHG | HEIGHT: 64 IN | HEART RATE: 74 BPM | OXYGEN SATURATION: 100 % | WEIGHT: 144.19 LBS

## 2025-04-14 DIAGNOSIS — R04.0 BLEEDING NOSE: ICD-10-CM

## 2025-04-14 DIAGNOSIS — H61.23 BILATERAL IMPACTED CERUMEN: Primary | ICD-10-CM

## 2025-04-14 PROCEDURE — 3074F SYST BP LT 130 MM HG: CPT | Mod: CPTII,S$GLB,,

## 2025-04-14 PROCEDURE — 99213 OFFICE O/P EST LOW 20 MIN: CPT | Mod: 25,S$GLB,,

## 2025-04-14 PROCEDURE — 3008F BODY MASS INDEX DOCD: CPT | Mod: CPTII,S$GLB,,

## 2025-04-14 PROCEDURE — 69209 REMOVE IMPACTED EAR WAX UNI: CPT | Mod: S$GLB,,,

## 2025-04-14 PROCEDURE — 3079F DIAST BP 80-89 MM HG: CPT | Mod: CPTII,S$GLB,,

## 2025-04-14 PROCEDURE — 99999 PR PBB SHADOW E&M-EST. PATIENT-LVL III: CPT | Mod: PBBFAC,,,

## 2025-04-14 PROCEDURE — 1159F MED LIST DOCD IN RCRD: CPT | Mod: CPTII,S$GLB,,

## 2025-04-14 PROCEDURE — 3044F HG A1C LEVEL LT 7.0%: CPT | Mod: CPTII,S$GLB,,

## 2025-04-14 NOTE — PROGRESS NOTES
CHIEF COMPLAINT     Chief Complaint   Patient presents with    Cerumen Impaction     Right ear    Epistaxis     Every 25/36 hours a week.        HPI     Lauren Schmid is a 44 y.o. female who presents for xxx today.    PCP is Nicolas Escalante MD, patient is new to me. This note was generated with the assistance of ambient listening technology. Verbal consent was obtained by the patient and accompanying visitor(s) for the recording of patient appointment to facilitate this note. I attest to having reviewed and edited the generated note for accuracy, though some syntax or spelling errors may persist. Please contact the author of this note for any clarification.     History of Present Illness    HPI:  Patient presents with concerns about ear congestion and recent onset of nosebleeds. Patient reports recurrent illnesses, increasing in frequency since early December. She recently completed a course of antibiotics and steroids prescribed by Dr. Nettles, which alleviated her symptoms. Following this illness, she had right ear congestion for several days. Although improved, she still has hearing difficulty in the right ear when waking, which resolves after approximately 1 hour. She has been previously informed of earwax accumulation.    She developed nosebleeds starting last Monday, occurring every 24 to 36 hours, exclusively from the left nostril. She has no prior history of nosebleeds. Each episode lasts about 15 minutes and produces approximately 0.5-0.75 ounces of blood (about 1.5 tablespoons). The bleeding begins as liquid and progresses to a thicker consistency.    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 She reports a history of more frequent and prolonged illnesses compared to the average person.    She denies any pain, dizziness, or other symptoms associated with the nosebleeds. She also denies any recent injuries or illnesses besides the recurring  colds.    MEDICATIONS:  Patient has completed courses of antibiotics and steroids for a respiratory illness.    MEDICAL HISTORY:  Patient has a history of frequent respiratory illnesses.    SOCIAL HISTORY:  Occupation: Works in a restaurant      ROS:  General: -fever, -chills, -fatigue, -weight gain, -weight loss  Eyes: -vision changes, -redness, -discharge  ENT: -ear pain, -nasal congestion, -sore throat, +ear pressure, +difficulty hearing, +nosebleeds  Cardiovascular: -chest pain, -palpitations, -lower extremity edema  Respiratory: -cough, -shortness of breath  Gastrointestinal: -abdominal pain, -nausea, -vomiting, -diarrhea, -constipation, -blood in stool  Genitourinary: -dysuria, -hematuria, -frequency  Musculoskeletal: -joint pain, -muscle pain  Skin: -rash, -lesion  Neurological: -headache, -dizziness, -numbness, -tingling  Psychiatric: -anxiety, -depression, -sleep difficulty          Past Medical History:  Past Medical History:   Diagnosis Date    Herpes simplex virus (HSV) infection     Plantar fasciitis of right foot 10/2022       Home Medications:  Prior to Admission medications    Medication Sig Start Date End Date Taking? Authorizing Provider   ibuprofen (ADVIL,MOTRIN) 600 MG tablet Take 600 mg by mouth every 6 (six) hours as needed. 12/23/24  Yes Provider, Historical   L norgest/e.estradioL-e.estrad (SEASONIQUE) 0.15 mg-30 mcg (84)/10 mcg (7) 3MPk Take 1 tablet by mouth once daily. ( take pills continuously) 3/28/25 3/28/26 Yes Kat Gil NP   methylPREDNISolone (MEDROL DOSEPACK) 4 mg tablet use as directed 3/25/25  Yes Nicolas Escalante MD   rizatriptan (MAXALT) 10 MG tablet Take one tablet (10 mg) at the onset of headaches; if symptoms persist or return, may repeat dose after 2 hours. maximum dose: 20 mg per 24 hours 8/19/24  Yes Gwen Méndez MD       Review of Systems:  Review of Systems   Constitutional: Negative.    HENT:  Positive for nosebleeds.    Respiratory: Negative.    "  Cardiovascular: Negative.        Health Maintainence:   Immunizations:  Health Maintenance         Date Due Completion Date    Influenza Vaccine (1) 09/01/2024 11/7/2023    COVID-19 Vaccine (5 - 2024-25 season) 09/01/2024 11/7/2023    Mammogram 02/19/2025 2/19/2024    Cervical Cancer Screening 10/13/2027 10/13/2022    TETANUS VACCINE 03/10/2030 3/10/2020    RSV Vaccine (Age 60+ and Pregnant patients) (1 - 1-dose 75+ series) 11/18/2055 ---             PHYSICAL EXAM     /80   Pulse 74   Ht 5' 4" (1.626 m)   Wt 65.4 kg (144 lb 2.9 oz)   SpO2 100%   BMI 24.75 kg/m²     Physical Exam  Vitals reviewed.   Constitutional:       Appearance: She is well-developed.   HENT:      Head: Normocephalic and atraumatic.      Right Ear: There is impacted cerumen.      Left Ear: There is impacted cerumen.   Eyes:      Conjunctiva/sclera: Conjunctivae normal.   Cardiovascular:      Rate and Rhythm: Normal rate.   Pulmonary:      Effort: Pulmonary effort is normal. No respiratory distress.   Skin:     General: Skin is warm and dry.      Findings: No rash.   Neurological:      Mental Status: She is alert and oriented to person, place, and time.      Coordination: Coordination normal.   Psychiatric:         Behavior: Behavior normal.           ASSESSMENT/PLAN   Assessment & Plan    EPISTAXIS (NOSEBLEEDS):  - Noted recurrent nosebleeds from left nostril every 24-36 hours, possibly due to ruptured vessel from frequent nose blowing during recent respiratory illness.  - Examined the nasal passage with no significant findings.  - Assessed that nosebleeds are not immediately concerning but warrant monitoring.  - Advised waiting 1 week to see if nosebleeds resolve or become chronic.  - Discussed potential risks of prolonged use of nasal decongestant sprays (>3 days).  - Instructed on proper technique for managing nosebleeds: tilt head forward and pinch bridge of nose.  - Recommend using Afrin (oxymetazoline) nasal spray for nosebleed " management if needed, limited to no more than 3 consecutive days.  - Instructed the patient to contact the office through patient portal if nosebleeds persist or worsen after 1 week for potential ENT referral.    IMPACTED CERUMEN (RIGHT EAR):  - Noted the patient's report of right ear being plugged up for days after recent illness.  - Acknowledged the patient's awareness of previous advice about wax buildup and need for ear cleaning.  - Planned to attempt earwax removal.    OCCUPATIONAL EXPOSURE:  - Noted the patient's report of working in a restaurant with constant exposure to people and potential pathogens.          Lauren was seen today for cerumen impaction and epistaxis.    Diagnoses and all orders for this visit:    Bilateral impacted cerumen    Bleeding nose    Bilateral ears flushed with a mixture of hydrogen peroxide and warm water. Copious amounts of cerumen returned. TM visible after cleaning ear canals.      Alden Deras NP   Department of Internal Medicine - [facility]  10:35 AM

## 2025-04-21 ENCOUNTER — HOSPITAL ENCOUNTER (OUTPATIENT)
Dept: RADIOLOGY | Facility: OTHER | Age: 45
Discharge: HOME | End: 2025-04-21
Attending: NURSE PRACTITIONER
Payer: COMMERCIAL

## 2025-04-21 DIAGNOSIS — Z12.31 VISIT FOR SCREENING MAMMOGRAM: ICD-10-CM

## 2025-04-21 PROCEDURE — 77067 SCR MAMMO BI INCL CAD: CPT | Mod: TC

## 2025-04-21 PROCEDURE — 77063 BREAST TOMOSYNTHESIS BI: CPT | Mod: 26,,, | Performed by: RADIOLOGY

## 2025-04-21 PROCEDURE — 77067 SCR MAMMO BI INCL CAD: CPT | Mod: 26,,, | Performed by: RADIOLOGY

## 2025-04-22 ENCOUNTER — TELEPHONE (OUTPATIENT)
Dept: OBSTETRICS AND GYNECOLOGY | Facility: CLINIC | Age: 45
End: 2025-04-22
Payer: COMMERCIAL

## 2025-04-22 ENCOUNTER — RESULTS FOLLOW-UP (OUTPATIENT)
Dept: OBSTETRICS AND GYNECOLOGY | Facility: CLINIC | Age: 45
End: 2025-04-22

## 2025-04-22 DIAGNOSIS — R92.8 ABNORMAL FINDING ON MAMMOGRAPHY: Primary | ICD-10-CM

## 2025-04-23 ENCOUNTER — TELEPHONE (OUTPATIENT)
Dept: RADIOLOGY | Facility: OTHER | Age: 45
End: 2025-04-23
Payer: COMMERCIAL

## 2025-04-24 ENCOUNTER — PATIENT MESSAGE (OUTPATIENT)
Dept: RADIOLOGY | Facility: OTHER | Age: 45
End: 2025-04-24
Payer: COMMERCIAL

## 2025-04-24 ENCOUNTER — TELEPHONE (OUTPATIENT)
Dept: RADIOLOGY | Facility: OTHER | Age: 45
End: 2025-04-24
Payer: COMMERCIAL

## 2025-05-01 ENCOUNTER — HOSPITAL ENCOUNTER (OUTPATIENT)
Dept: RADIOLOGY | Facility: OTHER | Age: 45
Discharge: HOME | End: 2025-05-01
Attending: NURSE PRACTITIONER
Payer: COMMERCIAL

## 2025-05-01 DIAGNOSIS — R92.8 ABNORMAL FINDING ON MAMMOGRAPHY: ICD-10-CM

## 2025-05-01 PROCEDURE — 76642 ULTRASOUND BREAST LIMITED: CPT | Mod: 26,LT,, | Performed by: RADIOLOGY

## 2025-05-01 PROCEDURE — 77065 DX MAMMO INCL CAD UNI: CPT | Mod: 26,LT,, | Performed by: RADIOLOGY

## 2025-05-01 PROCEDURE — 77065 DX MAMMO INCL CAD UNI: CPT | Mod: TC,LT

## 2025-05-01 PROCEDURE — 77061 BREAST TOMOSYNTHESIS UNI: CPT | Mod: 26,LT,, | Performed by: RADIOLOGY

## 2025-05-01 PROCEDURE — 76642 ULTRASOUND BREAST LIMITED: CPT | Mod: TC,LT

## 2025-05-08 ENCOUNTER — HOSPITAL ENCOUNTER (OUTPATIENT)
Dept: RADIOLOGY | Facility: OTHER | Age: 45
Discharge: HOME | End: 2025-05-08
Attending: NURSE PRACTITIONER
Payer: COMMERCIAL

## 2025-05-08 DIAGNOSIS — N63.0 BREAST MASS IN FEMALE: Primary | ICD-10-CM

## 2025-05-08 DIAGNOSIS — R92.8 ABNORMAL MAMMOGRAM: ICD-10-CM

## 2025-05-08 PROCEDURE — 19083 BX BREAST 1ST LESION US IMAG: CPT | Mod: LT

## 2025-05-08 PROCEDURE — 77065 DX MAMMO INCL CAD UNI: CPT | Mod: TC,LT

## 2025-05-08 PROCEDURE — 63600175 PHARM REV CODE 636 W HCPCS: Performed by: NURSE PRACTITIONER

## 2025-05-08 PROCEDURE — 77065 DX MAMMO INCL CAD UNI: CPT | Mod: 26,LT,, | Performed by: RADIOLOGY

## 2025-05-08 RX ORDER — LIDOCAINE HYDROCHLORIDE AND EPINEPHRINE 10; 20 UG/ML; MG/ML
1 INJECTION, SOLUTION INFILTRATION; PERINEURAL ONCE
Status: COMPLETED | OUTPATIENT
Start: 2025-05-08 | End: 2025-05-08

## 2025-05-08 RX ORDER — LIDOCAINE HYDROCHLORIDE 10 MG/ML
1 INJECTION, SOLUTION INFILTRATION; PERINEURAL ONCE
Status: COMPLETED | OUTPATIENT
Start: 2025-05-08 | End: 2025-05-08

## 2025-05-08 RX ADMIN — LIDOCAINE HYDROCHLORIDE 5 ML: 10 INJECTION, SOLUTION EPIDURAL; INFILTRATION; INTRACAUDAL at 10:05

## 2025-05-08 RX ADMIN — LIDOCAINE HYDROCHLORIDE,EPINEPHRINE BITARTRATE 10 ML: 20; .01 INJECTION, SOLUTION INFILTRATION; PERINEURAL at 10:05

## 2025-05-12 ENCOUNTER — TELEPHONE (OUTPATIENT)
Dept: RADIOLOGY | Facility: OTHER | Age: 45
End: 2025-05-12
Payer: COMMERCIAL

## 2025-05-12 ENCOUNTER — TELEPHONE (OUTPATIENT)
Dept: OBSTETRICS AND GYNECOLOGY | Facility: CLINIC | Age: 45
End: 2025-05-12
Payer: COMMERCIAL

## 2025-05-12 ENCOUNTER — PATIENT MESSAGE (OUTPATIENT)
Dept: RADIOLOGY | Facility: OTHER | Age: 45
End: 2025-05-12
Payer: COMMERCIAL

## 2025-05-12 DIAGNOSIS — C50.912 INVASIVE DUCTAL CARCINOMA OF BREAST, FEMALE, LEFT: Primary | ICD-10-CM

## 2025-05-12 NOTE — TELEPHONE ENCOUNTER
Patient notified of left breast biopsy results per pathology reported on 5/12/25. Diagnosis: INVASIVE DUCTAL CARCINOMA. Explained to patient results are positive for breast cancer. Instructed on need for consultation with breast surgeon. Questions answered. Appointment with breast surgeon requested. Office will call patient directly to confirm appointment. Patient verbalized understanding. Encouraged to call [phone] for further questions or concerns.

## 2025-05-13 DIAGNOSIS — C50.919 BREAST CANCER: Primary | ICD-10-CM

## 2025-05-14 ENCOUNTER — DOCUMENTATION ONLY (OUTPATIENT)
Dept: SURGERY | Facility: CLINIC | Age: 45
End: 2025-05-14
Payer: COMMERCIAL

## 2025-05-14 ENCOUNTER — RESULTS FOLLOW-UP (OUTPATIENT)
Dept: RADIOLOGY | Facility: HOSPITAL | Age: 45
End: 2025-05-14

## 2025-05-14 NOTE — NURSING
Called patient and scheduled appt for VV on 5/16/25, MRI on 5/15/25 and multi D appt on 5/27/25 with Dr. Patterson and Dr. Jacob. Reviewed location of appt. Patient verbalized understanding.  Oncology Navigation   Intake  Date of Diagnosis: 05/08/25  Cancer Type: Breast  Type of Referral: Internal  Date of Referral: 05/12/25  Initial Nurse Navigator Contact: 05/14/25  Referral to Initial Contact Timeline (days): 2  First Appointment Available: 05/16/25  Appointment Date: 05/16/25  First Available Date vs. Scheduled Date (days): 0     Treatment  Current Status: Staging work-up    Surgical Oncologist: Dr. Patterson  Consult Date: 05/27/25    Medical Oncologist: Dr. Jacob  Consult Date: 05/27/25       Procedures: Biopsy  Biopsy Schedule Date: 05/08/25       ER: Positive  CO: Positive       Support Systems: Family members     Acuity      Follow Up  No follow-ups on file.

## 2025-05-15 ENCOUNTER — HOSPITAL ENCOUNTER (OUTPATIENT)
Dept: RADIOLOGY | Facility: OTHER | Age: 45
Discharge: HOME | End: 2025-05-15
Attending: PHYSICIAN ASSISTANT
Payer: COMMERCIAL

## 2025-05-15 DIAGNOSIS — C50.919 BREAST CANCER: ICD-10-CM

## 2025-05-15 PROCEDURE — 77049 MRI BREAST C-+ W/CAD BI: CPT | Mod: TC

## 2025-05-15 PROCEDURE — 77049 MRI BREAST C-+ W/CAD BI: CPT | Mod: 26,,, | Performed by: RADIOLOGY

## 2025-05-15 PROCEDURE — 25500020 PHARM REV CODE 255: Performed by: PHYSICIAN ASSISTANT

## 2025-05-15 PROCEDURE — A9577 INJ MULTIHANCE: HCPCS | Performed by: PHYSICIAN ASSISTANT

## 2025-05-15 RX ADMIN — GADOBENATE DIMEGLUMINE 13 ML: 529 INJECTION, SOLUTION INTRAVENOUS at 03:05

## 2025-05-16 ENCOUNTER — OFFICE VISIT (OUTPATIENT)
Dept: SURGERY | Facility: CLINIC | Age: 45
End: 2025-05-16
Payer: COMMERCIAL

## 2025-05-16 DIAGNOSIS — Z17.0 MALIGNANT NEOPLASM OF OVERLAPPING SITES OF LEFT BREAST IN FEMALE, ESTROGEN RECEPTOR POSITIVE: Primary | ICD-10-CM

## 2025-05-16 DIAGNOSIS — C50.812 MALIGNANT NEOPLASM OF OVERLAPPING SITES OF LEFT BREAST IN FEMALE, ESTROGEN RECEPTOR POSITIVE: Primary | ICD-10-CM

## 2025-05-16 NOTE — Clinical Note
Patient wants BCT but will be talking with family over the weekend. Will let you know if this changes but for now can look for a date for lump.

## 2025-05-16 NOTE — PROGRESS NOTES
The patient location is: home  The chief complaint leading to consultation is: biopsy result review     Visit type: audiovisual     Face to Face time with patient: 20  30 minutes of total time spent on the encounter, which includes face to face time and non-face to face time preparing to see the patient (eg, review of tests), Obtaining and/or reviewing separately obtained history, Documenting clinical information in the electronic or other health record, Independently interpreting results (not separately reported) and communicating results to the patient/family/caregiver, or Care coordination (not separately reported).         Each patient to whom he or she provides medical services by telemedicine is:  (1) informed of the relationship between the physician and patient and the respective role of any other health care provider with respect to management of the patient; and (2) notified that he or she may decline to receive medical services by telemedicine and may withdraw from such care at any time.     Notes:      Breast Surgery  [facility]  Department of Surgery      REFERRING PROVIDER: No referring provider defined for this encounter.    Chief Complaint: No chief complaint on file.      Subjective:      Patient ID: Lauren Schmid is a 44 y.o. female who presents with newly diagnosed invasive ductal carcinoma    Patient underwent screening mammogram on 4/21/25 which showed a left breast focal asymmetry. Follow-up mammogram/US (5/1/25) showed a 1.4 cm hypoechoic mass at the 12 OC 3 CFN of the left breast. A ultrasound guided biopsy was performed on 5/8/25 with pathology revealing infiltrating ductal carcinoma of the breast.     Patient does routinely do self breast exams.  Patient has not noted a change on breast exam.  Patient denies nipple discharge. Patient denies to previous breast biopsy. Patient denies a personal history of breast cancer.    Findings at that time were the following:   Tumor size: 1.7  cm   Tumor rdgrdrrdarddrderd:rd rd3rd Estrogen Receptor: +   Progesterone Receptor: +   Her-2 johanne: -   Lymph node status: not identified    Lymphatic invasion: not identified       GYN History:  Age of menarche was 16. Premenopausal - On OCP's.   Patient is .     Past Medical History:   Diagnosis Date    Herpes simplex virus (HSV) infection     Plantar fasciitis of right foot 10/2022     Past Surgical History:   Procedure Laterality Date    MOUTH SURGERY N/A 2025     Medications Ordered Prior to Encounter[1]  Social History[2]  Family History   Problem Relation Name Age of Onset    Breast cancer Neg Hx      Colon cancer Neg Hx      Ovarian cancer Neg Hx            Objective:   There were no vitals taken for this visit.    Radiology review: Images personally reviewed by me in the clinic.     25 Bilateral Screening Mammo:    Findings:  This procedure was performed using tomosynthesis. Computer-aided detection was utilized in the interpretation of this examination.        The breasts are heterogeneously dense, which may obscure small masses.      Left  There is a focal asymmetry seen in the left breast at 12 o'clock in the posterior depth.      Right  There is no evidence of suspicious masses, calcifications, or other abnormal findings in the right breast.     Impression:  Left  Focal Asymmetry: Left breast focal asymmetry at 12 o'clock in the posterior depth. Assessment: 0 - Incomplete. Diagnostic Mammogram and/or Ultrasound is recommended.      Right  There is no mammographic evidence of malignancy in the right breast.     BI-RADS Category:   Overall: 0 - Incomplete: Needs Additional Imaging Evaluation        Recommendation:  Diagnostic mammogram with possible ultrasound (if indicated) is recommended.        Your estimated lifetime risk of breast cancer (to age 85) based on Tyrer-Cuzick risk assessment model is 9.87%.  According to the American Cancer Society, patients with a lifetime breast cancer risk of 20% or  higher might benefit from supplemental screening tests, such as screening   breast MRI.    5/1/25 Left Diagnostic Mammo/US:    Findings:  This procedure was performed using tomosynthesis. Computer-aided detection was utilized in the interpretation of this examination.        Mammo Digital Diagnostic Left with Neri (XPD)  The breasts are heterogeneously dense, which may obscure small masses.  There is an irregularly shaped mass with spiculated margins seen in the left breast at 12 o'clock in the posterior depth. The mass correlates with the screening mammogram asymmetry finding.      US Breast Left Limited  There is a 1.3 cm x 1 cm x 1.4 cm irregularly shaped, hypoechoic mass with angular margins seen in the left breast at 12 o'clock, 3 cm from the nipple. The mass correlates with the mass seen on today's mammogram.     The axilla is normal.        Impression:  Left  Mass: Left breast 1.3 cm x 1 cm x 1.4 cm mass at 12 o'clock. Assessment: 4C - Suspicious finding. Ultrasound-guided biopsy is recommended.      I discussed the findings and recommendations for today's exam in detail with the patient.       BI-RADS Category:   Overall: 4 - Suspicious        Recommendation:  Ultrasound-guided biopsy is recommended.    5/15/25 MRI Breast:     Findings:  The breasts have heterogeneous fibroglandular tissue. The background parenchymal enhancement is mild and symmetric.      Left  There is a 1.4 cm x 1.2 cm x 1.7 cm irregularly shaped mass with irregular margins and heterogeneous internal enhancement seen in the central region of the left breast in the posterior depth, 6.8 cm from the nipple, 3 cm from the skin, and 2 cm from the chest wall. The mass correlates with known cancer.      Right  There is no evidence of suspicious masses, abnormal enhancement, or other abnormal findings in the right breast.     No abnormal skin or nipple enhancement.      No axillary or internal mammary lymphadenopathy.       Impression:  Left  Mass: Left breast 1.4 cm x 1.2 cm x 1.7 cm mass heterogeneous internal enhancement in the central region in the posterior depth.  Associated internal reflector for surgical localization.  Assessment: 6 - Known biopsy, proven malignancy. This patient is established in breast surgery clinic.      Right  There is no MR evidence of malignancy in the right breast.     BI-RADS Category:   Overall: 6 - Known Biopsy-Proven Malignancy     Recommendation:  This patient is established in breast surgery clinic for known left breast cancer.         Assessment:       1. Malignant neoplasm of overlapping sites of left breast in female, estrogen receptor positive        Plan:   Reviewed imaging and pathology results in detail. Discussed treatment of breast cancer will include surgery, radiation and medication. Will proceed with upfront surgery. Discussed surgical options including partial mastectomy (lumpectomy) and mastectomy. Briefly discussed the role of radiation following a partial mastectomy. Discussed she will meet with a medical oncologist following surgery for further discussion of endocrine therapy and possibly chemotherapy pending results of an Oncotype.   MRI performed and results discussed today.   Scheduled for surgical consultation with Dr. Patterson on 5/27/25.   Patient would like to undergo genetic testing which will be completed at appointment     The patient is in agreement with the plan. Questions were encouraged and answered to patient's satisfaction. Lauren will call our office with any questions or concerns.           [1]   Current Outpatient Medications on File Prior to Visit   Medication Sig Dispense Refill    ibuprofen (ADVIL,MOTRIN) 600 MG tablet Take 600 mg by mouth every 6 (six) hours as needed.      methylPREDNISolone (MEDROL DOSEPACK) 4 mg tablet use as directed 1 each 0    rizatriptan (MAXALT) 10 MG tablet Take one tablet (10 mg) at the onset of headaches; if symptoms persist or  return, may repeat dose after 2 hours. maximum dose: 20 mg per 24 hours 8 tablet 5     Current Facility-Administered Medications on File Prior to Visit   Medication Dose Route Frequency Provider Last Rate Last Admin    [COMPLETED] gadobenate dimeglumine (MULTIHANCE) injection 13 mL  13 mL Intravenous ONCE PRN Lanny Santana PA-C   13 mL at 05/15/25 1526   [2]   Social History  Socioeconomic History    Marital status:    Tobacco Use    Smoking status: Never    Smokeless tobacco: Never   Substance and Sexual Activity    Alcohol use: Yes    Drug use: No    Sexual activity: Yes     Partners: Male     Birth control/protection: OCP     Social Drivers of Health     Financial Resource Strain: High Risk (3/24/2025)    Overall Financial Resource Strain (CARDIA)     Difficulty of Paying Living Expenses: Hard   Food Insecurity: Food Insecurity Present (3/24/2025)    Hunger Vital Sign     Worried About Running Out of Food in the Last Year: Sometimes true     Ran Out of Food in the Last Year: Never true   Transportation Needs: No Transportation Needs (3/24/2025)    PRAPARE - Transportation     Lack of Transportation (Medical): No     Lack of Transportation (Non-Medical): No   Physical Activity: Sufficiently Active (3/24/2025)    Exercise Vital Sign     Days of Exercise per Week: 7 days     Minutes of Exercise per Session: 60 min   Stress: No Stress Concern Present (3/24/2025)    [place] [employer] - Occupational Stress Questionnaire     Feeling of Stress : Only a little   Housing Stability: High Risk (3/24/2025)    Housing Stability Vital Sign     Unable to Pay for Housing in the Last Year: Yes     Number of Times Moved in the Last Year: 0     Homeless in the Last Year: No

## 2025-05-18 ENCOUNTER — PATIENT MESSAGE (OUTPATIENT)
Dept: INTERNAL MEDICINE | Facility: CLINIC | Age: 45
End: 2025-05-18
Payer: COMMERCIAL

## 2025-05-18 ENCOUNTER — PATIENT MESSAGE (OUTPATIENT)
Dept: SURGERY | Facility: CLINIC | Age: 45
End: 2025-05-18
Payer: COMMERCIAL

## 2025-05-19 NOTE — TELEPHONE ENCOUNTER
Please advice to pt message.     Pt has GI appt at main campus on 6/12/25.     Placed [facility] referral just incase for earlier appt.

## 2025-05-20 NOTE — TELEPHONE ENCOUNTER
Called and spoke to patient via telephone call. Scheduled appt with Dr. Escalante for tomorrow 5/20/25.  Patient accepted appointment, and verbalized gratitude and understanding.

## 2025-05-21 ENCOUNTER — OFFICE VISIT (OUTPATIENT)
Dept: INTERNAL MEDICINE | Facility: CLINIC | Age: 45
End: 2025-05-21
Payer: COMMERCIAL

## 2025-05-21 VITALS
BODY MASS INDEX: 24.26 KG/M2 | WEIGHT: 141.31 LBS | DIASTOLIC BLOOD PRESSURE: 88 MMHG | SYSTOLIC BLOOD PRESSURE: 128 MMHG | OXYGEN SATURATION: 100 % | HEART RATE: 90 BPM

## 2025-05-21 DIAGNOSIS — R10.12 LUQ ABDOMINAL PAIN: ICD-10-CM

## 2025-05-21 DIAGNOSIS — F41.9 ANXIETY: Primary | ICD-10-CM

## 2025-05-21 PROCEDURE — 1159F MED LIST DOCD IN RCRD: CPT | Mod: CPTII,S$GLB,,

## 2025-05-21 PROCEDURE — 3074F SYST BP LT 130 MM HG: CPT | Mod: CPTII,S$GLB,,

## 2025-05-21 PROCEDURE — 3008F BODY MASS INDEX DOCD: CPT | Mod: CPTII,S$GLB,,

## 2025-05-21 PROCEDURE — 3079F DIAST BP 80-89 MM HG: CPT | Mod: CPTII,S$GLB,,

## 2025-05-21 PROCEDURE — 3044F HG A1C LEVEL LT 7.0%: CPT | Mod: CPTII,S$GLB,,

## 2025-05-21 PROCEDURE — 99999 PR PBB SHADOW E&M-EST. PATIENT-LVL III: CPT | Mod: PBBFAC,,,

## 2025-05-21 PROCEDURE — 1160F RVW MEDS BY RX/DR IN RCRD: CPT | Mod: CPTII,S$GLB,,

## 2025-05-21 PROCEDURE — 99213 OFFICE O/P EST LOW 20 MIN: CPT | Mod: S$GLB,,,

## 2025-05-21 RX ORDER — HYDROXYZINE PAMOATE 25 MG/1
25 CAPSULE ORAL DAILY PRN
Qty: 30 CAPSULE | Refills: 0 | Status: SHIPPED | OUTPATIENT
Start: 2025-05-21

## 2025-05-21 NOTE — PROGRESS NOTES
[facility]  Progress Note    SUBJECTIVE:     Chief Complaint:   Chief Complaint   Patient presents with    GI Problem       History of Present Illness:  44 y.o. female who  has a past medical history of Breast cancer, Herpes simplex virus (HSV) infection, and Plantar fasciitis of right foot (10/2022). presents to clinic today for Patient presents today for follow up of recently diagnosed breast cancer and associated GI symptoms.    #Breast cancer  She recently been diagnosed with breast cancer via mammogram, ultrasound, and biopsy. The mass is described as the size of a pencil eraser with no lymph node involvement. She has upcoming appointments scheduled with both surgical and medical oncologists.    #GI symptoms  Initially following diagnosis, she experienced inability to eat, constant anxiety-related stomach symptoms, and diarrhea. Currently reports sharp pain at the costal margin of the abdomen, rated 4-5/10 in severity. Pain is worse in the morning, with bending, and when lying on left side. She denies throbbing quality to the pain. Symptoms have improved with normalized sleep and eating patterns, and diarrhea has resolved in past 48 hours with return to normal bowel movements. She reports difficulty maintaining sleep, experiencing episodes of waking up at night with inability to return to sleep for up to 1.5 hours. She currently takes a partial dose (three-quarters or two-thirds) of a THC-CBD sleep gummy, noting that a full dose is too strong. She denies smoking marijuana. The gummy is sometimes effective for sleep maintenance, particularly during periods of heightened anxiety. She denies any history of antidepressant or anti-anxiety medication use.      Allergies:  Review of patient's allergies indicates:   Allergen Reactions    Codeine Other (See Comments)     vomitting       Home Medications:  Current Outpatient Medications on File Prior to Visit   Medication Sig    ibuprofen  (ADVIL,MOTRIN) 600 MG tablet Take 600 mg by mouth every 6 (six) hours as needed.    methylPREDNISolone (MEDROL DOSEPACK) 4 mg tablet use as directed    rizatriptan (MAXALT) 10 MG tablet Take one tablet (10 mg) at the onset of headaches; if symptoms persist or return, may repeat dose after 2 hours. maximum dose: 20 mg per 24 hours     No current facility-administered medications on file prior to visit.       Past Medical History:   Diagnosis Date    Breast cancer     Herpes simplex virus (HSV) infection     Plantar fasciitis of right foot 10/2022     Past Surgical History:   Procedure Laterality Date    MOUTH SURGERY N/A 04/07/2025     Family History   Problem Relation Name Age of Onset    Breast cancer Neg Hx      Colon cancer Neg Hx      Ovarian cancer Neg Hx       Social History[1]       OBJECTIVE:     Vital Signs:  Pulse: 90 (05/21/25 1051)  BP: 128/88 (05/21/25 1051)  SpO2: 100 % (05/21/25 1051)    Physical Exam  Constitutional:       General: She is not in acute distress.     Appearance: Normal appearance. She is not toxic-appearing.   HENT:      Head: Normocephalic and atraumatic.      Right Ear: External ear normal.      Left Ear: External ear normal.      Nose: Nose normal.      Mouth/Throat:      Mouth: Mucous membranes are moist.   Eyes:      Extraocular Movements: Extraocular movements intact.   Cardiovascular:      Rate and Rhythm: Normal rate and regular rhythm.      Pulses: Normal pulses.      Heart sounds: Normal heart sounds.   Pulmonary:      Effort: Pulmonary effort is normal. No respiratory distress.   Abdominal:      General: Abdomen is flat.      Palpations: Abdomen is soft.      Tenderness: There is no abdominal tenderness.   Musculoskeletal:      Cervical back: Normal range of motion and neck supple.   Skin:     General: Skin is warm.      Findings: No bruising or erythema.   Neurological:      General: No focal deficit present.      Mental Status: She is alert.   Psychiatric:         Mood and  Affect: Mood normal.         Laboratory:  Hemoglobin A1C   Date Value Ref Range Status   08/22/2023 4.7 4.0 - 5.6 % Final     Comment:     ADA Screening Guidelines:  5.7-6.4%  Consistent with prediabetes  >or=6.5%  Consistent with diabetes    High levels of fetal hemoglobin interfere with the HbA1C  assay. Heterozygous hemoglobin variants (HbS, HgC, etc)do  not significantly interfere with this assay.   However, presence of multiple variants may affect accuracy.       Hemoglobin A1c   Date Value Ref Range Status   03/25/2025 4.9 4.0 - 5.6 % Final     Comment:     ADA Screening Guidelines:  5.7-6.4%  Consistent with prediabetes  >=6.5%  Consistent with diabetes    High levels of fetal hemoglobin interfere with the HbA1C  assay. Heterozygous hemoglobin variants (HbS, HgC, etc)do  not significantly interfere with this assay.   However, presence of multiple variants may affect accuracy.       A/P:    Lauren was seen today for gi problem.    Diagnoses and all orders for this visit:    Anxiety  -     hydrOXYzine pamoate (VISTARIL) 25 MG Cap; Take 1 capsule (25 mg total) by mouth daily as needed.  -     Ambulatory referral/consult to Psychiatry; Future    LUQ abdominal pain    Assessed recent breast cancer diagnosis and current treatment plan with [facility].  Evaluated reported GI symptoms, likely related to stress and anxiety from cancer diagnosis, will trial hydroxyzine prn as needed for stress/panic episodes  Patient would likely benefit from therapy during this time, referral to psychology placed   Considered abdominal pain in left upper quadrant.  Discussed option for abdominal US if symptoms persist, but agreed to wait and monitor as symptoms seem to be resolving     Follow up in 2-3 months for follow up    This note was generated with the assistance of ambient listening technology. Verbal consent was obtained by the patient and accompanying visitor(s) for the recording of patient appointment to  facilitate this note. I attest to having reviewed and edited the generated note for accuracy, though some syntax or spelling errors may persist. Please contact the author of this note for any clarification.      Nicolas Escalante MD   Family Medicine   [facility]           [1]   Social History  Tobacco Use    Smoking status: Never    Smokeless tobacco: Never   Substance Use Topics    Alcohol use: Yes    Drug use: No

## 2025-05-22 ENCOUNTER — OFFICE VISIT (OUTPATIENT)
Dept: URGENT CARE | Facility: CLINIC | Age: 45
End: 2025-05-22
Payer: COMMERCIAL

## 2025-05-22 VITALS
WEIGHT: 141 LBS | BODY MASS INDEX: 24.07 KG/M2 | DIASTOLIC BLOOD PRESSURE: 89 MMHG | TEMPERATURE: 98 F | SYSTOLIC BLOOD PRESSURE: 144 MMHG | OXYGEN SATURATION: 99 % | HEIGHT: 64 IN | HEART RATE: 87 BPM | RESPIRATION RATE: 20 BRPM

## 2025-05-22 DIAGNOSIS — M79.641 RIGHT HAND PAIN: ICD-10-CM

## 2025-05-22 DIAGNOSIS — M77.8 RIGHT HAND TENDONITIS: ICD-10-CM

## 2025-05-22 DIAGNOSIS — S63.91XA HAND SPRAIN, RIGHT, INITIAL ENCOUNTER: Primary | ICD-10-CM

## 2025-05-22 PROCEDURE — 73130 X-RAY EXAM OF HAND: CPT | Mod: RT,S$GLB,, | Performed by: RADIOLOGY

## 2025-05-22 RX ORDER — KETOROLAC TROMETHAMINE 30 MG/ML
30 INJECTION, SOLUTION INTRAMUSCULAR; INTRAVENOUS
Status: COMPLETED | OUTPATIENT
Start: 2025-05-22 | End: 2025-05-22

## 2025-05-22 RX ADMIN — KETOROLAC TROMETHAMINE 30 MG: 30 INJECTION, SOLUTION INTRAMUSCULAR; INTRAVENOUS at 01:05

## 2025-05-22 NOTE — PATIENT INSTRUCTIONS
- Warm compresses to the area 1-3 times per day for 10-20 minutes at a time.   - Lightly squeezing a ball in the right hand for 5-10 squeezes. If it starts causing pain, stop.     A referral for Hand specialist has been placed. Call [phone] to schedule an appointment. Make sure to follow up in 1-2 weeks or sooner if symptoms continue or worsen.     - Today you have received a Toradol injection. DO NOT TAKE ANY NSAIDs (Ibuprofen, Motrin, Advil, Naproxen, etc.) for 24 hours after receiving the injection.     - Rest.    - Drink plenty of fluids.    - Acetaminophen (tylenol) or Ibuprofen (advil,motrin) as directed as needed for fever/pain. Avoid tylenol if you have a history of liver disease. Do not take ibuprofen if you have a history of GI bleeding, kidney disease, or if you take blood thinners.   - Ibuprofen dosing for adults: 400 mg by mouth every 4-6 hours as needed. Max: 2400 mg/day; Info: use lowest effective dose, shortest effective treatment duration; give w/ food if GI upset occurs.  - Tylenol dosing for adults: [By mouth route, immediate-release form] Dose: 325-1000 mg by mouth every 4-6h as needed; Max: 1 g/4h and 4 g/day from all sources. [By mouth route, extended-release form] Dose: 650-1300 mg Extended Release by mouth every 8h as needed; Max: 4 g/day from all sources.     - You must understand that you have received an Urgent Care treatment only and that you may be released before all of your medical problems are known or treated.   - You, the patient, will arrange for follow up care as instructed.   - If your condition worsens or fails to improve we recommend that you receive another evaluation at the ER immediately or contact your PCP to discuss your concerns or return here.   - Follow up with your PCP or specialty clinic as directed in the next 1-2 weeks if not improved or as needed.  You can call [phone] to schedule an appointment with the appropriate provider.    If your symptoms do not  improve or worsen, go to the emergency room immediately.

## 2025-05-22 NOTE — PROGRESS NOTES
"Subjective:      Patient ID: Lauren Schmid is a 44 y.o. female.    Vitals:  height is 5' 4" (1.626 m) and weight is 64 kg (141 lb). Her temperature is 98.2 °F (36.8 °C). Her blood pressure is 144/89 (abnormal) and her pulse is 87. Her respiration is 20 and oxygen saturation is 99%.     Chief Complaint: Hand Pain    Pt presents with complaint of right hand/finger pain x3-4 weeks.  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  Pt states she has pain when performing different work related tasks.  Pt states she has taken ibuprofen for her pain with some relief.  Pt states she has normal range of motion but states she has discomfort when flexing/making a fist    Hand Pain   The incident occurred more than 1 week ago. The incident occurred at home. There was no injury mechanism. The quality of the pain is described as aching. The pain does not radiate. The pain is at a severity of 6/10. The pain is moderate. The pain has been Fluctuating since the incident. The symptoms are aggravated by palpation and movement. She has tried NSAIDs for the symptoms. The treatment provided mild relief.       Musculoskeletal:  Positive for pain and joint pain.   Skin:  Negative for erythema.      Objective:     Physical Exam   Constitutional: She is oriented to person, place, and time.  Non-toxic appearance. She does not appear ill. No distress.      Comments:Patient sits comfortably in exam chair. Answers questions in complete sentences. Does not show any signs of distress or discoloration.        HENT:   Head: Normocephalic and atraumatic.   Ears:   Right Ear: External ear normal.   Left Ear: External ear normal.   Nose: Nose normal.   Eyes: Pupils are equal, round, and reactive to light. Extraocular movement intact   Pulmonary/Chest: Effort normal. No respiratory distress.   Abdominal: Normal appearance.   Musculoskeletal:         General: Swelling, tenderness and signs of injury " present. No deformity.        Hands:       Right lower leg: No edema.      Left lower leg: No edema.   Neurological: no focal deficit. She is alert and oriented to person, place, and time.   Skin: Skin is warm, dry, not diaphoretic, not pale and no rash. Capillary refill takes less than 2 seconds. No bruising, No erythema and No lesion no jaundice  Psychiatric: Her behavior is normal. Mood, judgment and thought content normal.     XR HAND COMPLETE 3 VIEW RIGHT  Result Date: 5/22/2025  EXAMINATION: XR HAND COMPLETE 3 VIEW RIGHT CLINICAL HISTORY: Pain in right hand TECHNIQUE: PA, lateral, and oblique views of the right hand were performed. COMPARISON: None FINDINGS: Bones are fairly well mineralized.  There is some widening between the scaphoid and lunate suggesting ligamentous disruption.  No acute fractures.  No significant soft tissue swelling.     There is some widening between the scaphoid and lunate suggesting ligamentous disruption.  No convincing soft tissue swelling suggesting the injury may be subacute/chronic.  Correlation advised. This report was flagged in Epic as abnormal. Electronically signed by: Ramsey Yepez MD Date:    05/22/2025 Time:    14:34      Assessment:     1. Hand sprain, right, initial encounter    2. Right hand pain    3. Right hand tendonitis        Plan:   There is some widening between the scaphoid and lunate suggesting ligamentous disruption. Patient will return to clinic tomorrow for wrist place placement since she is already at work. Hand referral already placed.     Hand sprain, right, initial encounter  -     WRIST BRACE FOR HOME USE    Right hand pain  -     XR HAND COMPLETE 3 VIEW RIGHT; Future; Expected date: 05/22/2025    Right hand tendonitis  -     ketorolac injection 30 mg  -     Cancel: Ambulatory referral/consult to Hand Surgery  -     Ambulatory referral/consult to Hand Surgery                Patient Instructions   - Warm compresses to the area 1-3 times per day for  10-20 minutes at a time.   - Lightly squeezing a ball in the right hand for 5-10 squeezes. If it starts causing pain, stop.     A referral for Hand specialist has been placed. Call [phone] to schedule an appointment. Make sure to follow up in 1-2 weeks or sooner if symptoms continue or worsen.     - Today you have received a Toradol injection. DO NOT TAKE ANY NSAIDs (Ibuprofen, Motrin, Advil, Naproxen, etc.) for 24 hours after receiving the injection.     - Rest.    - Drink plenty of fluids.    - Acetaminophen (tylenol) or Ibuprofen (advil,motrin) as directed as needed for fever/pain. Avoid tylenol if you have a history of liver disease. Do not take ibuprofen if you have a history of GI bleeding, kidney disease, or if you take blood thinners.   - Ibuprofen dosing for adults: 400 mg by mouth every 4-6 hours as needed. Max: 2400 mg/day; Info: use lowest effective dose, shortest effective treatment duration; give w/ food if GI upset occurs.  - Tylenol dosing for adults: [By mouth route, immediate-release form] Dose: 325-1000 mg by mouth every 4-6h as needed; Max: 1 g/4h and 4 g/day from all sources. [By mouth route, extended-release form] Dose: 650-1300 mg Extended Release by mouth every 8h as needed; Max: 4 g/day from all sources.     - You must understand that you have received an Urgent Care treatment only and that you may be released before all of your medical problems are known or treated.   - You, the patient, will arrange for follow up care as instructed.   - If your condition worsens or fails to improve we recommend that you receive another evaluation at the ER immediately or contact your PCP to discuss your concerns or return here.   - Follow up with your PCP or specialty clinic as directed in the next 1-2 weeks if not improved or as needed.  You can call [phone] to schedule an appointment with the appropriate provider.    If your symptoms do not improve or worsen, go to the emergency room  immediately.

## 2025-05-26 PROBLEM — C50.812 MALIGNANT NEOPLASM OF OVERLAPPING SITES OF LEFT BREAST IN FEMALE, ESTROGEN RECEPTOR POSITIVE: Status: ACTIVE | Noted: 2025-05-26

## 2025-05-26 PROBLEM — Z17.0 MALIGNANT NEOPLASM OF OVERLAPPING SITES OF LEFT BREAST IN FEMALE, ESTROGEN RECEPTOR POSITIVE: Status: ACTIVE | Noted: 2025-05-26

## 2025-05-26 NOTE — H&P (VIEW-ONLY)
Breast Surgery  [facility]  Department of Surgery      REFERRING PROVIDER: Kat Gil, MICHELLE  [address]  [place] [address]    Chief Complaint: Breast Cancer (New Patient Left Breast Cancer .)      Subjective:      Patient ID: Lauren Schmid is a 44 y.o. female who presents with newly diagnosed ER + RI + (20%) HER2 - IDC of the left breast.      Screening mammogram (25) showed a focal asymmetry seen in the left breast at 12 o'clock in the posterior depth.  Follow-up mammogram/ US (25) showed a 1.4 cm mass in the left breast at 12 o'clock, 3 cm from the nipple. A ultrasound guided biopsy was performed on 25 with pathology revealing infiltrating ductal carcinoma of the breast.     Patient does not routinely do self breast exams.  Patient has not noted a change on breast exam.  Patient denies nipple discharge. Patient denies to previous breast biopsy. Patient denies a personal history of breast cancer.    Findings at that time were the following:   Tumor size: 1.7 cm (MRI)  Tumor rdgrdrrdarddrderd:rd rd3rd Estrogen Receptor: +   Progesterone Receptor: +   Her-2 johanne: -   Lymph node status: clinically negative   Lymphatic invasion: n/a   Clip: reflector     GYN History:  Age of menarche was 16. Premenopausal - On OCP's.   Patient is .     Family history: denies family history of breast cancer as well as other cancers    Social history: denies smoking     Past Medical History:   Diagnosis Date    Breast cancer     Herpes simplex virus (HSV) infection     Plantar fasciitis of right foot 10/2022     Past Surgical History:   Procedure Laterality Date    MOUTH SURGERY N/A 2025     Medications Ordered Prior to Encounter[1]  Social History[2]  Family History   Problem Relation Name Age of Onset    Breast cancer Neg Hx      Colon cancer Neg Hx      Ovarian cancer Neg Hx          Review of Systems   Constitutional:  Negative for chills and fever.   HENT:  Negative for congestion.   "  Respiratory:  Negative for shortness of breath.    Cardiovascular:  Negative for chest pain.   Gastrointestinal:  Positive for abdominal pain (resolved). Negative for blood in stool, constipation and diarrhea.   Psychiatric/Behavioral:  Negative for agitation, behavioral problems and confusion. The patient is nervous/anxious.        Objective:   BP (!) 135/93 (BP Location: Right arm, Patient Position: Sitting)   Pulse 84   Ht 5' 4" (1.626 m)   Wt 64 kg (141 lb)   SpO2 100%   BMI 24.20 kg/m²     Physical Exam   HENT:   Head: Normocephalic and atraumatic.   Eyes: Conjunctivae are normal. No scleral icterus.   Cardiovascular:  Normal rate, regular rhythm and normal pulses.            Pulmonary/Chest: Effort normal and breath sounds normal. No accessory muscle usage or stridor. No respiratory distress. She has no wheezes. Right breast exhibits no inverted nipple, no mass, no nipple discharge, no skin change and no tenderness. Left breast exhibits mass. Left breast exhibits no inverted nipple, no nipple discharge, no skin change and no tenderness.   No adenopathy   L breast: 1.5 cm area of mass vs post biopsy changes 12 oc 2cfn  No skin changes/discharge/tenderness  Right breat without any abnormalities    Abdominal: Soft. Normal appearance. She exhibits no mass.   Musculoskeletal: No deformity. Lymphadenopathy:      Cervical: No cervical adenopathy.      Upper Body:      Right upper body: No supraclavicular or axillary adenopathy.      Left upper body: No supraclavicular or axillary adenopathy.     Neurological: She is alert.   Skin: Skin is warm and dry.     Psychiatric: Mood and judgment normal.       Radiology review: Images personally reviewed by me in the clinic.   Narrative & Impression  Result:   MRI Breast w/wo Contrast, w/CAD, Bilateral     History:  Patient is 44 y.o. with biopsy demonstrating left breast invasive ductal carcinoma.  Evaluate extent of disease.        Films Compared:  Compared to: " 05/08/2025 US Breast Biopsy with Imaging 1st site Left, 05/08/2025 Mammo Digital Diagnostic Left, 05/01/2025 Mammo Digital Diagnostic Left with Neri (XPD), 05/01/2025 US Breast Left Limited, and 04/21/2025 Mammo Digital Screening Bilat w/ Neri (XPD)     Technique:  A routine breast MRI was performed with a dedicated breast coil. Pre-contrast STIR were acquired. Then, pre and post contrast T1 weighted fat saturated images were acquired and subtracted with MIP reconstruction.  13 mL of intravenous gadolinium contrast was administered. The study was reviewed with [employer] software.     Findings:  The breasts have heterogeneous fibroglandular tissue. The background parenchymal enhancement is mild and symmetric.      Left  There is a 1.4 cm x 1.2 cm x 1.7 cm irregularly shaped mass with irregular margins and heterogeneous internal enhancement seen in the central region of the left breast in the posterior depth, 6.8 cm from the nipple, 3 cm from the skin, and 2 cm from the chest wall. The mass correlates with known cancer.      Right  There is no evidence of suspicious masses, abnormal enhancement, or other abnormal findings in the right breast.     No abnormal skin or nipple enhancement.      No axillary or internal mammary lymphadenopathy.      Impression:  Left  Mass: Left breast 1.4 cm x 1.2 cm x 1.7 cm mass heterogeneous internal enhancement in the central region in the posterior depth.  Associated internal reflector for surgical localization.  Assessment: 6 - Known biopsy, proven malignancy. This patient is established in breast surgery clinic.      Right  There is no MR evidence of malignancy in the right breast.     BI-RADS Category:   Overall: 6 - Known Biopsy-Proven Malignancy     Recommendation:  This patient is established in breast surgery clinic for known left breast cancer.      Assessment:       1. Malignant neoplasm of overlapping sites of left breast in female, estrogen receptor positive    2. Invasive  ductal carcinoma of breast, female, left        Plan:     Options for management were discussed with the patient and her family. We reviewed the existing data noting the equivalency of breast conserving surgery with radiation therapy and mastectomy. We also reviewed the guidelines of the National Comprehensive Cancer Network for Stage 1 breast carcinoma. We discussed the need for lumpectomy margins to be negative for carcinoma, the necessity for postoperative radiation therapy after breast conservation in most cases, the possibility of a failed or false negative sentinel lymph node biopsy and the potential need for complete lymphadenectomy for a failed or positive sentinel lymph node biopsy were fully discussed. In the setting of mastectomy, delayed or immediate reconstruction options are available and were discussed.     In the setting of lumpectomy, radiation therapy would be recommended majority of the time.  The duration and treatment side effects were discussed with the patient.  This will coordinated with the radiation oncologist pending final pathology.    We also discussed the role of systemic therapy in the treatment of early stage breast cancer.  We discussed that this is based on tumor biology and wil status and will be determined based on final pathology.  We discussed that if the cancer is hormone positive, endocrine therapy would be recommended in most cases and its use can reduce the risk of recurrence as well as improve survival. Side effects of treatment were briefly discussed. We also discussed the potential role for chemotherapy based on a number of factors such as tumor phenotype (ER+ vs. triple negative vs. Qyl6xqs+) and this would be determined in coordination with the medical oncologist.    The patient has elected to proceed with left partial mastectomy and sentinel lymph node biopsy.The operative risks of bleeding, infection, recurrence, scarring, and anesthetic complications and the  possibility of requiring further surgery were all noted and informed consent obtained. She will also proceed with genetic testing.     Surgery scheduled. Follow-up in clinic roughly 14 days after surgery.     Patient was educated on breast cancer, receptors, wire localization lumpectomy, mastectomy, sentinel lymph node mapping and biopsy, axillary lymph node dissection, reconstruction, breast prosthesis with post-mastectomy bra and radiation therapy. Patient was given patient information binder including [facility] breast cancer treatment brochure.  All her questions were answered.      Total time spent with the patient: 65.  45 minutes of face to face consultation and 20 minutes of chart review and coordination of care.               [1]   Current Outpatient Medications on File Prior to Visit   Medication Sig Dispense Refill    hydrOXYzine pamoate (VISTARIL) 25 MG Cap Take 1 capsule (25 mg total) by mouth daily as needed. 30 capsule 0    rizatriptan (MAXALT) 10 MG tablet Take one tablet (10 mg) at the onset of headaches; if symptoms persist or return, may repeat dose after 2 hours. maximum dose: 20 mg per 24 hours 8 tablet 5    ibuprofen (ADVIL,MOTRIN) 600 MG tablet Take 600 mg by mouth every 6 (six) hours as needed.      methylPREDNISolone (MEDROL DOSEPACK) 4 mg tablet use as directed 1 each 0     No current facility-administered medications on file prior to visit.   [2]   Social History  Socioeconomic History    Marital status:    Tobacco Use    Smoking status: Never    Smokeless tobacco: Never   Substance and Sexual Activity    Alcohol use: Yes    Drug use: No    Sexual activity: Yes     Partners: Male     Birth control/protection: OCP     Social Drivers of Health     Financial Resource Strain: High Risk (3/24/2025)    Overall Financial Resource Strain (CARDIA)     Difficulty of Paying Living Expenses: Hard   Food Insecurity: Food Insecurity Present (3/24/2025)    Hunger Vital Sign     Worried About Running  Out of Food in the Last Year: Sometimes true     Ran Out of Food in the Last Year: Never true   Transportation Needs: No Transportation Needs (3/24/2025)    PRAPARE - Transportation     Lack of Transportation (Medical): No     Lack of Transportation (Non-Medical): No   Physical Activity: Sufficiently Active (3/24/2025)    Exercise Vital Sign     Days of Exercise per Week: 7 days     Minutes of Exercise per Session: 60 min   Stress: No Stress Concern Present (3/24/2025)    [employer] - Occupational Stress Questionnaire     Feeling of Stress : Only a little   Housing Stability: High Risk (3/24/2025)    Housing Stability Vital Sign     Unable to Pay for Housing in the Last Year: Yes     Number of Times Moved in the Last Year: 0     Homeless in the Last Year: No

## 2025-05-26 NOTE — PROGRESS NOTES
Breast Surgery  [facility]  Department of Surgery      REFERRING PROVIDER: Kat Gil, MICHELLE  [address]  [place] [address]    Chief Complaint: Breast Cancer (New Patient Left Breast Cancer .)      Subjective:      Patient ID: Lauren Schmid is a 44 y.o. female who presents with newly diagnosed ER + NE + (20%) HER2 - IDC of the left breast.      Screening mammogram (25) showed a focal asymmetry seen in the left breast at 12 o'clock in the posterior depth.  Follow-up mammogram/ US (25) showed a 1.4 cm mass in the left breast at 12 o'clock, 3 cm from the nipple. A ultrasound guided biopsy was performed on 25 with pathology revealing infiltrating ductal carcinoma of the breast.     Patient does not routinely do self breast exams.  Patient has not noted a change on breast exam.  Patient denies nipple discharge. Patient denies to previous breast biopsy. Patient denies a personal history of breast cancer.    Findings at that time were the following:   Tumor size: 1.7 cm (MRI)  Tumor stgstrstastdstest:st st1st Estrogen Receptor: +   Progesterone Receptor: +   Her-2 johanne: -   Lymph node status: clinically negative   Lymphatic invasion: n/a   Clip: reflector     GYN History:  Age of menarche was 16. Premenopausal - On OCP's.   Patient is .     Family history: denies family history of breast cancer as well as other cancers    Social history: denies smoking     Past Medical History:   Diagnosis Date    Breast cancer     Herpes simplex virus (HSV) infection     Plantar fasciitis of right foot 10/2022     Past Surgical History:   Procedure Laterality Date    MOUTH SURGERY N/A 2025     Medications Ordered Prior to Encounter[1]  Social History[2]  Family History   Problem Relation Name Age of Onset    Breast cancer Neg Hx      Colon cancer Neg Hx      Ovarian cancer Neg Hx          Review of Systems   Constitutional:  Negative for chills and fever.   HENT:  Negative for congestion.   "  Respiratory:  Negative for shortness of breath.    Cardiovascular:  Negative for chest pain.   Gastrointestinal:  Positive for abdominal pain (resolved). Negative for blood in stool, constipation and diarrhea.   Psychiatric/Behavioral:  Negative for agitation, behavioral problems and confusion. The patient is nervous/anxious.        Objective:   BP (!) 135/93 (BP Location: Right arm, Patient Position: Sitting)   Pulse 84   Ht 5' 4" (1.626 m)   Wt 64 kg (141 lb)   SpO2 100%   BMI 24.20 kg/m²     Physical Exam   HENT:   Head: Normocephalic and atraumatic.   Eyes: Conjunctivae are normal. No scleral icterus.   Cardiovascular:  Normal rate, regular rhythm and normal pulses.            Pulmonary/Chest: Effort normal and breath sounds normal. No accessory muscle usage or stridor. No respiratory distress. She has no wheezes. Right breast exhibits no inverted nipple, no mass, no nipple discharge, no skin change and no tenderness. Left breast exhibits mass. Left breast exhibits no inverted nipple, no nipple discharge, no skin change and no tenderness.   No adenopathy   L breast: 1.5 cm area of mass vs post biopsy changes 12 oc 2cfn  No skin changes/discharge/tenderness  Right breat without any abnormalities    Abdominal: Soft. Normal appearance. She exhibits no mass.   Musculoskeletal: No deformity. Lymphadenopathy:      Cervical: No cervical adenopathy.      Upper Body:      Right upper body: No supraclavicular or axillary adenopathy.      Left upper body: No supraclavicular or axillary adenopathy.     Neurological: She is alert.   Skin: Skin is warm and dry.     Psychiatric: Mood and judgment normal.       Radiology review: Images personally reviewed by me in the clinic.   Narrative & Impression  Result:   MRI Breast w/wo Contrast, w/CAD, Bilateral     History:  Patient is 44 y.o. with biopsy demonstrating left breast invasive ductal carcinoma.  Evaluate extent of disease.        Films Compared:  Compared to: " 05/08/2025 US Breast Biopsy with Imaging 1st site Left, 05/08/2025 Mammo Digital Diagnostic Left, 05/01/2025 Mammo Digital Diagnostic Left with Neri (XPD), 05/01/2025 US Breast Left Limited, and 04/21/2025 Mammo Digital Screening Bilat w/ Neri (XPD)     Technique:  A routine breast MRI was performed with a dedicated breast coil. Pre-contrast STIR were acquired. Then, pre and post contrast T1 weighted fat saturated images were acquired and subtracted with MIP reconstruction.  13 mL of intravenous gadolinium contrast was administered. The study was reviewed with [employer] software.     Findings:  The breasts have heterogeneous fibroglandular tissue. The background parenchymal enhancement is mild and symmetric.      Left  There is a 1.4 cm x 1.2 cm x 1.7 cm irregularly shaped mass with irregular margins and heterogeneous internal enhancement seen in the central region of the left breast in the posterior depth, 6.8 cm from the nipple, 3 cm from the skin, and 2 cm from the chest wall. The mass correlates with known cancer.      Right  There is no evidence of suspicious masses, abnormal enhancement, or other abnormal findings in the right breast.     No abnormal skin or nipple enhancement.      No axillary or internal mammary lymphadenopathy.      Impression:  Left  Mass: Left breast 1.4 cm x 1.2 cm x 1.7 cm mass heterogeneous internal enhancement in the central region in the posterior depth.  Associated internal reflector for surgical localization.  Assessment: 6 - Known biopsy, proven malignancy. This patient is established in breast surgery clinic.      Right  There is no MR evidence of malignancy in the right breast.     BI-RADS Category:   Overall: 6 - Known Biopsy-Proven Malignancy     Recommendation:  This patient is established in breast surgery clinic for known left breast cancer.      Assessment:       1. Malignant neoplasm of overlapping sites of left breast in female, estrogen receptor positive    2. Invasive  ductal carcinoma of breast, female, left        Plan:     Options for management were discussed with the patient and her family. We reviewed the existing data noting the equivalency of breast conserving surgery with radiation therapy and mastectomy. We also reviewed the guidelines of the National Comprehensive Cancer Network for Stage 1 breast carcinoma. We discussed the need for lumpectomy margins to be negative for carcinoma, the necessity for postoperative radiation therapy after breast conservation in most cases, the possibility of a failed or false negative sentinel lymph node biopsy and the potential need for complete lymphadenectomy for a failed or positive sentinel lymph node biopsy were fully discussed. In the setting of mastectomy, delayed or immediate reconstruction options are available and were discussed.     In the setting of lumpectomy, radiation therapy would be recommended majority of the time.  The duration and treatment side effects were discussed with the patient.  This will coordinated with the radiation oncologist pending final pathology.    We also discussed the role of systemic therapy in the treatment of early stage breast cancer.  We discussed that this is based on tumor biology and wil status and will be determined based on final pathology.  We discussed that if the cancer is hormone positive, endocrine therapy would be recommended in most cases and its use can reduce the risk of recurrence as well as improve survival. Side effects of treatment were briefly discussed. We also discussed the potential role for chemotherapy based on a number of factors such as tumor phenotype (ER+ vs. triple negative vs. Fvs2gke+) and this would be determined in coordination with the medical oncologist.    The patient has elected to proceed with left partial mastectomy and sentinel lymph node biopsy.The operative risks of bleeding, infection, recurrence, scarring, and anesthetic complications and the  possibility of requiring further surgery were all noted and informed consent obtained. She will also proceed with genetic testing.     Surgery scheduled. Follow-up in clinic roughly 14 days after surgery.     Patient was educated on breast cancer, receptors, wire localization lumpectomy, mastectomy, sentinel lymph node mapping and biopsy, axillary lymph node dissection, reconstruction, breast prosthesis with post-mastectomy bra and radiation therapy. Patient was given patient information binder including [facility] breast cancer treatment brochure.  All her questions were answered.      Total time spent with the patient: 65.  45 minutes of face to face consultation and 20 minutes of chart review and coordination of care.               [1]   Current Outpatient Medications on File Prior to Visit   Medication Sig Dispense Refill    hydrOXYzine pamoate (VISTARIL) 25 MG Cap Take 1 capsule (25 mg total) by mouth daily as needed. 30 capsule 0    rizatriptan (MAXALT) 10 MG tablet Take one tablet (10 mg) at the onset of headaches; if symptoms persist or return, may repeat dose after 2 hours. maximum dose: 20 mg per 24 hours 8 tablet 5    ibuprofen (ADVIL,MOTRIN) 600 MG tablet Take 600 mg by mouth every 6 (six) hours as needed.      methylPREDNISolone (MEDROL DOSEPACK) 4 mg tablet use as directed 1 each 0     No current facility-administered medications on file prior to visit.   [2]   Social History  Socioeconomic History    Marital status:    Tobacco Use    Smoking status: Never    Smokeless tobacco: Never   Substance and Sexual Activity    Alcohol use: Yes    Drug use: No    Sexual activity: Yes     Partners: Male     Birth control/protection: OCP     Social Drivers of Health     Financial Resource Strain: High Risk (3/24/2025)    Overall Financial Resource Strain (CARDIA)     Difficulty of Paying Living Expenses: Hard   Food Insecurity: Food Insecurity Present (3/24/2025)    Hunger Vital Sign     Worried About Running  Out of Food in the Last Year: Sometimes true     Ran Out of Food in the Last Year: Never true   Transportation Needs: No Transportation Needs (3/24/2025)    PRAPARE - Transportation     Lack of Transportation (Medical): No     Lack of Transportation (Non-Medical): No   Physical Activity: Sufficiently Active (3/24/2025)    Exercise Vital Sign     Days of Exercise per Week: 7 days     Minutes of Exercise per Session: 60 min   Stress: No Stress Concern Present (3/24/2025)    [employer] - Occupational Stress Questionnaire     Feeling of Stress : Only a little   Housing Stability: High Risk (3/24/2025)    Housing Stability Vital Sign     Unable to Pay for Housing in the Last Year: Yes     Number of Times Moved in the Last Year: 0     Homeless in the Last Year: No

## 2025-05-27 ENCOUNTER — OFFICE VISIT (OUTPATIENT)
Dept: HEMATOLOGY/ONCOLOGY | Facility: CLINIC | Age: 45
End: 2025-05-27
Payer: COMMERCIAL

## 2025-05-27 ENCOUNTER — LAB VISIT (OUTPATIENT)
Dept: LAB | Facility: HOSPITAL | Age: 45
End: 2025-05-27
Attending: SURGERY
Payer: COMMERCIAL

## 2025-05-27 ENCOUNTER — DOCUMENTATION ONLY (OUTPATIENT)
Dept: SURGERY | Facility: CLINIC | Age: 45
End: 2025-05-27
Payer: COMMERCIAL

## 2025-05-27 ENCOUNTER — PATIENT MESSAGE (OUTPATIENT)
Dept: OBSTETRICS AND GYNECOLOGY | Facility: CLINIC | Age: 45
End: 2025-05-27
Payer: COMMERCIAL

## 2025-05-27 ENCOUNTER — OFFICE VISIT (OUTPATIENT)
Dept: SURGERY | Facility: CLINIC | Age: 45
End: 2025-05-27
Payer: COMMERCIAL

## 2025-05-27 VITALS
WEIGHT: 141 LBS | HEART RATE: 84 BPM | HEIGHT: 64 IN | DIASTOLIC BLOOD PRESSURE: 93 MMHG | OXYGEN SATURATION: 100 % | SYSTOLIC BLOOD PRESSURE: 135 MMHG | BODY MASS INDEX: 24.07 KG/M2

## 2025-05-27 VITALS
TEMPERATURE: 98 F | WEIGHT: 136.69 LBS | BODY MASS INDEX: 23.34 KG/M2 | DIASTOLIC BLOOD PRESSURE: 93 MMHG | OXYGEN SATURATION: 100 % | SYSTOLIC BLOOD PRESSURE: 135 MMHG | HEIGHT: 64 IN | HEART RATE: 84 BPM | RESPIRATION RATE: 18 BRPM

## 2025-05-27 DIAGNOSIS — C50.812 MALIGNANT NEOPLASM OF OVERLAPPING SITES OF LEFT BREAST IN FEMALE, ESTROGEN RECEPTOR POSITIVE: ICD-10-CM

## 2025-05-27 DIAGNOSIS — C50.912 INVASIVE DUCTAL CARCINOMA OF BREAST, FEMALE, LEFT: ICD-10-CM

## 2025-05-27 DIAGNOSIS — C50.812 MALIGNANT NEOPLASM OF OVERLAPPING SITES OF LEFT BREAST IN FEMALE, ESTROGEN RECEPTOR POSITIVE: Primary | ICD-10-CM

## 2025-05-27 DIAGNOSIS — C50.912 INVASIVE DUCTAL CARCINOMA OF BREAST, FEMALE, LEFT: Primary | ICD-10-CM

## 2025-05-27 DIAGNOSIS — Z17.0 MALIGNANT NEOPLASM OF OVERLAPPING SITES OF LEFT BREAST IN FEMALE, ESTROGEN RECEPTOR POSITIVE: Primary | ICD-10-CM

## 2025-05-27 DIAGNOSIS — Z17.0 MALIGNANT NEOPLASM OF OVERLAPPING SITES OF LEFT BREAST IN FEMALE, ESTROGEN RECEPTOR POSITIVE: ICD-10-CM

## 2025-05-27 LAB
ABSOLUTE EOSINOPHIL (OHS): 0.06 K/UL
ABSOLUTE MONOCYTE (OHS): 0.49 K/UL (ref 0.3–1)
ABSOLUTE NEUTROPHIL COUNT (OHS): 4.62 K/UL (ref 1.8–7.7)
ALBUMIN SERPL BCP-MCNC: 3.8 G/DL (ref 3.5–5.2)
ALP SERPL-CCNC: 52 UNIT/L (ref 40–150)
ALT SERPL W/O P-5'-P-CCNC: 12 UNIT/L (ref 10–44)
ANION GAP (OHS): 9 MMOL/L (ref 8–16)
AST SERPL-CCNC: 17 UNIT/L (ref 11–45)
BASOPHILS # BLD AUTO: 0.06 K/UL
BASOPHILS NFR BLD AUTO: 0.9 %
BILIRUB SERPL-MCNC: 0.5 MG/DL (ref 0.1–1)
BUN SERPL-MCNC: 12 MG/DL (ref 6–20)
CALCIUM SERPL-MCNC: 8.8 MG/DL (ref 8.7–10.5)
CHLORIDE SERPL-SCNC: 106 MMOL/L (ref 95–110)
CO2 SERPL-SCNC: 23 MMOL/L (ref 23–29)
CREAT SERPL-MCNC: 0.7 MG/DL (ref 0.5–1.4)
ERYTHROCYTE [DISTWIDTH] IN BLOOD BY AUTOMATED COUNT: 13.4 % (ref 11.5–14.5)
GFR SERPLBLD CREATININE-BSD FMLA CKD-EPI: >60 ML/MIN/1.73/M2
GLUCOSE SERPL-MCNC: 95 MG/DL (ref 70–110)
HCT VFR BLD AUTO: 44.4 % (ref 37–48.5)
HGB BLD-MCNC: 14.1 GM/DL (ref 12–16)
IMM GRANULOCYTES # BLD AUTO: 0.03 K/UL (ref 0–0.04)
IMM GRANULOCYTES NFR BLD AUTO: 0.4 % (ref 0–0.5)
LYMPHOCYTES # BLD AUTO: 1.42 K/UL (ref 1–4.8)
MCH RBC QN AUTO: 28.4 PG (ref 27–31)
MCHC RBC AUTO-ENTMCNC: 31.8 G/DL (ref 32–36)
MCV RBC AUTO: 89 FL (ref 82–98)
NUCLEATED RBC (/100WBC) (OHS): 0 /100 WBC
PLATELET # BLD AUTO: 197 K/UL (ref 150–450)
PMV BLD AUTO: 11.1 FL (ref 9.2–12.9)
POTASSIUM SERPL-SCNC: 4 MMOL/L (ref 3.5–5.1)
PROT SERPL-MCNC: 7.3 GM/DL (ref 6–8.4)
RBC # BLD AUTO: 4.97 M/UL (ref 4–5.4)
RELATIVE EOSINOPHIL (OHS): 0.9 %
RELATIVE LYMPHOCYTE (OHS): 21.3 % (ref 18–48)
RELATIVE MONOCYTE (OHS): 7.3 % (ref 4–15)
RELATIVE NEUTROPHIL (OHS): 69.2 % (ref 38–73)
SODIUM SERPL-SCNC: 138 MMOL/L (ref 136–145)
WBC # BLD AUTO: 6.68 K/UL (ref 3.9–12.7)

## 2025-05-27 PROCEDURE — 3044F HG A1C LEVEL LT 7.0%: CPT | Mod: CPTII,S$GLB,, | Performed by: INTERNAL MEDICINE

## 2025-05-27 PROCEDURE — 36415 COLL VENOUS BLD VENIPUNCTURE: CPT

## 2025-05-27 PROCEDURE — 3075F SYST BP GE 130 - 139MM HG: CPT | Mod: CPTII,S$GLB,, | Performed by: SURGERY

## 2025-05-27 PROCEDURE — G2211 COMPLEX E/M VISIT ADD ON: HCPCS | Mod: S$GLB,,, | Performed by: INTERNAL MEDICINE

## 2025-05-27 PROCEDURE — 3080F DIAST BP >= 90 MM HG: CPT | Mod: CPTII,S$GLB,, | Performed by: SURGERY

## 2025-05-27 PROCEDURE — 85025 COMPLETE CBC W/AUTO DIFF WBC: CPT

## 2025-05-27 PROCEDURE — 99999 PR PBB SHADOW E&M-EST. PATIENT-LVL IV: CPT | Mod: PBBFAC,,, | Performed by: INTERNAL MEDICINE

## 2025-05-27 PROCEDURE — 84075 ASSAY ALKALINE PHOSPHATASE: CPT

## 2025-05-27 PROCEDURE — 3075F SYST BP GE 130 - 139MM HG: CPT | Mod: CPTII,S$GLB,, | Performed by: INTERNAL MEDICINE

## 2025-05-27 PROCEDURE — 99204 OFFICE O/P NEW MOD 45 MIN: CPT | Mod: S$GLB,,, | Performed by: INTERNAL MEDICINE

## 2025-05-27 PROCEDURE — 3008F BODY MASS INDEX DOCD: CPT | Mod: CPTII,S$GLB,, | Performed by: SURGERY

## 2025-05-27 PROCEDURE — 99205 OFFICE O/P NEW HI 60 MIN: CPT | Mod: S$GLB,,, | Performed by: SURGERY

## 2025-05-27 PROCEDURE — 3044F HG A1C LEVEL LT 7.0%: CPT | Mod: CPTII,S$GLB,, | Performed by: SURGERY

## 2025-05-27 PROCEDURE — 99999 PR PBB SHADOW E&M-EST. PATIENT-LVL V: CPT | Mod: PBBFAC,,, | Performed by: SURGERY

## 2025-05-27 PROCEDURE — 3008F BODY MASS INDEX DOCD: CPT | Mod: CPTII,S$GLB,, | Performed by: INTERNAL MEDICINE

## 2025-05-27 PROCEDURE — 3080F DIAST BP >= 90 MM HG: CPT | Mod: CPTII,S$GLB,, | Performed by: INTERNAL MEDICINE

## 2025-05-27 RX ORDER — SODIUM CHLORIDE 9 MG/ML
INJECTION, SOLUTION INTRAVENOUS CONTINUOUS
OUTPATIENT
Start: 2025-05-27

## 2025-05-27 NOTE — Clinical Note
Hey!  She has a small favorable cancer.  She prefers a lumpectomy and radiation, so will proceed that way.  Making sure her genetics is negative but I think she has a good plan.  I'll let you know is anything major changes.    Thanks for sending her! Tamar Patterson MD Breast Surgical Oncology

## 2025-05-27 NOTE — Clinical Note
Hey, this patient was just diagnosed with ER+ breast cancer. She has been on OCP x 10 years for very heavy menses and she is very concerned regarding stopping this as well as endocrine therapy. I put in a referral. Hoping you can help out Naomi

## 2025-05-27 NOTE — NURSING
Nurse Navigator Note:     Met with patient during her consult with Dr. Patterson.  Patient and I reviewed the information she discussed with Dr. Patterson, including treatment options, diagnosis, and future plans for workup. Patient and I went through the new patient booklet, explained some of the information and why it is provided.     Also offered patient consults with our other specialty clinics: Integrative Oncology, Survivorship and/or Women's Gynecologic needs, our breast physical therapy department for pre-op and post-operative assessments, Oncologic Psychology for psychological support, and Oncologic Nutrition for nutritional counseling. Explained to patient that all of these support services are completely optional. Discussed that physical therapy may call patient to offer pre-op appt, and what that appt would entail.     Patient was given a copy of her appointments, Dr. Patterson's card, and my card. Encouraged her to call me if she has any questions or concerns or would like to schedule any additional appointments. Verbalized understanding of all information.Met with  and reviewed Breast Cancer Patient Guidebook, email added to support group, referrals sent for Integrative Oncology and PT,   Oncology Navigation   Intake  Date of Diagnosis: 05/08/25  Cancer Type: Breast  Type of Referral: Internal  Date of Referral: 05/12/25  Initial Nurse Navigator Contact: 05/14/25  Referral to Initial Contact Timeline (days): 2  First Appointment Available: 05/16/25  Appointment Date: 05/16/25  First Available Date vs. Scheduled Date (days): 0     Treatment  Current Status: Staging work-up    Surgical Oncologist: Dr. Patterson  Consult Date: 05/27/25    Medical Oncologist: Dr. Jacob  Consult Date: 05/27/25       Procedures: Biopsy  Biopsy Schedule Date: 05/08/25    Physical Therapy Referral Date: 05/27/25    ER: Positive  LA: Positive       Support Systems: Family members     Acuity      Follow Up  No follow-ups on file.

## 2025-05-27 NOTE — PROGRESS NOTES
Genetics Lay Navigator Note:    Nurse Navigator : NANETTE Costa RN    MULTI-D PT. :  Surg Onc. : Yesenia  Med Onc. : Nidia    Met with patient at her multi-d consult with Dr. Patterson (5/27/2025), to facilitate genetic testing. Set patient up with [facility] genetic counselor over the phone to complete counseling prior to testing. Patient verbalized understanding to all counseling information. [facility] brochure with number to call with questions or concerns provided to patient as well as my card. Encouraged patient to call me or [facility] at any time.     Lab appointment made and patient escorted with [facility] kit to lab for specimen draw and processing. Patient instructed that results will be provided as soon as they are available. No questions or concerns from patient about plan of care.     [facility] Genetic Pedigree & TRF scanned in media and attached to this documentation note.    [facility] Tracking # [id number]

## 2025-05-29 NOTE — PROGRESS NOTES
[facility]/ [facility]   at [facility] Note:      Chief Complaint:   Encounter Diagnosis   Name Primary?    Invasive ductal carcinoma of breast, female, left        Cancer Staging   Malignant neoplasm of overlapping sites of left breast in female, estrogen receptor positive  Staging form: Breast, AJCC 8th Edition  - Clinical stage from 2025: Stage IA (cT1b, cN0, cM0, G2, ER+, MD+, HER2-) - Signed by Naomi Jacob MD on 2025      HPI:  Lauren Schmid is a 44 y.o. female who presents today for evaluation of newly diagnosed breast cancer.     Oncology History  Screening mammogram 25:  focal asymmetry seen in the left breast at 12 o'clock in the posterior depth  Mammogram/ US 25: 1.4 cm mass in the left breast at 12 o'clock, 3 cm from the nipple.    Biopsy 25: IDC, grade 2, ER 90%/ MD 20%/ Her2 1+; Ki67 30%    She is concerned as she has been on OCP x 10 yrs for significant menses      GYN History:  Age of menarche was 16.   Premenopausal - On OCP's.   Patient is .       Social History[1]  Family History   Problem Relation Name Age of Onset    Breast cancer Neg Hx      Colon cancer Neg Hx      Ovarian cancer Neg Hx       Past Medical History:   Diagnosis Date    Breast cancer     Herpes simplex virus (HSV) infection     Plantar fasciitis of right foot 10/2022     Past Surgical History:   Procedure Laterality Date    MOUTH SURGERY N/A 2025       Problem List[2]    Current Outpatient Medications   Medication Instructions    hydrOXYzine pamoate (VISTARIL) 25 mg, Oral, Daily PRN    ibuprofen (ADVIL,MOTRIN) 600 mg, Every 6 hours PRN    methylPREDNISolone (MEDROL DOSEPACK) 4 mg tablet use as directed    rizatriptan (MAXALT) 10 MG tablet Take one tablet (10 mg) at the onset of headaches; if symptoms persist or return, may repeat dose after 2 hours. maximum dose: 20 mg per 24 hours       Review of Systems:   Review of Systems  "  Constitutional: Negative.    HENT: Negative.     Respiratory: Negative.     Cardiovascular: Negative.    Gastrointestinal: Negative.    Musculoskeletal: Negative.    Neurological: Negative.    All other systems reviewed and are negative.      PHYSICAL EXAM:  BP (!) 135/93 (BP Location: Right arm, Patient Position: Sitting)   Pulse 84   Temp 98 °F (36.7 °C) (Oral)   Resp 18   Ht 5' 4" (1.626 m)   Wt 62 kg (136 lb 11 oz)   LMP  (LMP Unknown)   SpO2 100%   BMI 23.46 kg/m²     Deferred       Pertinent Labs & Imaging:  Pathology Results  (Last 30 days)                 05/08/25 1030  Specimen to Pathology Breast Edited Result - FINAL            No results found for this or any previous visit (from the past 24 hours).    Assessment & Plan:    1. Invasive ductal carcinoma of breast, female, left  - Ambulatory referral/consult to Hematology / Oncology      Reviewed patients referring notes, imaging and pathology. Discussed diagnosis, staging, and treatment in detail with patient.   Patient with stage I ER+ breast cancer   Planning for upfront surgery. Recommend oncotype  Standard of care for clinically low risk ER+ disease is genomic assay. OncotypeDX is a genomic assay of 21 genes unique to the tumor. The test reveals prognostic and predictive results based on NSABP B-14, NSABP B-20, and TAILORx clinical trials. These trials demonstrated that patients with low recurrence score do not benefit from the addition of chemotherapy to adjuvant endocrine therapy. Those patient with a high recurrence score do benefit from the addition of chemotherapy to adjuvant endocrine therapy.  Testing is done on patient's original tumor.     Will follow up post op for discussion    Will refer to IO for symptom management as she stops OCP    Route Chart for Scheduling    Med Onc Chart Routing      Follow up with physician . 2 weeks post op   Follow up with [facility]    Infusion scheduling note    Injection scheduling note    Labs    Imaging  "   Pharmacy appointment    Other referrals                     MDM includes  :    - Acute or chronic illness or injury that poses a threat to life or bodily function  - Review of prior external notes from unique source  - Independent review and explanation of 3+ results from unique tests  - Extensive discussion of treatment and management    Visit today included increased complexity associated with the care of the episodic problem breast cancer addressed and managing the longitudinal care of the patient due to serious and/or complex managed problem(s)        Naomi Jacob MD   05/29/2025         [1]   Social History  Tobacco Use    Smoking status: Never    Smokeless tobacco: Never   Substance Use Topics    Alcohol use: Yes    Drug use: No   [2]   Patient Active Problem List  Diagnosis    Lack of coordination    Myalgia    Malignant neoplasm of overlapping sites of left breast in female, estrogen receptor positive

## 2025-05-30 ENCOUNTER — ANESTHESIA EVENT (OUTPATIENT)
Dept: SURGERY | Facility: OTHER | Age: 45
End: 2025-05-30
Payer: COMMERCIAL

## 2025-05-30 ENCOUNTER — PATIENT MESSAGE (OUTPATIENT)
Dept: HEMATOLOGY/ONCOLOGY | Facility: CLINIC | Age: 45
End: 2025-05-30
Payer: COMMERCIAL

## 2025-05-31 ENCOUNTER — PATIENT MESSAGE (OUTPATIENT)
Dept: SURGERY | Facility: CLINIC | Age: 45
End: 2025-05-31
Payer: COMMERCIAL

## 2025-06-02 ENCOUNTER — PATIENT MESSAGE (OUTPATIENT)
Dept: INTERNAL MEDICINE | Facility: CLINIC | Age: 45
End: 2025-06-02
Payer: COMMERCIAL

## 2025-06-02 DIAGNOSIS — R10.12 LUQ ABDOMINAL PAIN: Primary | ICD-10-CM

## 2025-06-02 NOTE — ANESTHESIA PREPROCEDURE EVALUATION
06/02/2025  Lauren Schmid is a 44 y.o., female.      Pre-op Assessment    I have reviewed the Patient Summary Reports.     I have reviewed the Nursing Notes. I have reviewed the NPO Status.   I have reviewed the Medications.     Review of Systems  Anesthesia Hx:  No previous Anesthesia             Denies Family Hx of Anesthesia complications.    Denies Personal Hx of Anesthesia complications.                    Social:  Non-Smoker, Alcohol Use       Hematology/Oncology:                      Current/Recent Cancer.  Breast              Cardiovascular:  Cardiovascular Normal                                              Pulmonary:  Pulmonary Normal                       Hepatic/GI:  Hepatic/GI Normal                    Neurological:      Headaches                                 Endocrine:  Endocrine Normal            Psych:   anxiety               Physical Exam  General: Well nourished and Cooperative    Airway:  Mallampati: I   Mouth Opening: Normal  TM Distance: Normal  Neck ROM: Normal ROM    Dental:  Intact    Anesthesia Plan  Type of Anesthesia, risks & benefits discussed:    Anesthesia Type: Gen ETT, Gen Supraglottic Airway  Intra-op Monitoring Plan: Standard ASA Monitors  Post Op Pain Control Plan: multimodal analgesia  Induction:  IV  Airway Plan: Video  Informed Consent: Informed consent signed with the Patient and all parties understand the risks and agree with anesthesia plan.  All questions answered.   ASA Score: 2  Day of Surgery Review of History & Physical: H&P Update referred to the surgeon/provider.  Anesthesia Plan Notes: CBC, CMP 05/2025 in [facility]      Ready For Surgery From Anesthesia Perspective.     .

## 2025-06-04 ENCOUNTER — OFFICE VISIT (OUTPATIENT)
Dept: ORTHOPEDICS | Facility: CLINIC | Age: 45
End: 2025-06-04
Payer: COMMERCIAL

## 2025-06-04 DIAGNOSIS — S63.650A SPRAIN OF METACARPOPHALANGEAL (MCP) JOINT OF RIGHT INDEX FINGER, INITIAL ENCOUNTER: Primary | ICD-10-CM

## 2025-06-04 DIAGNOSIS — M25.541 PAIN INVOLVING JOINT OF FINGER OF RIGHT HAND: ICD-10-CM

## 2025-06-04 LAB — BEAKER SEE SCANNED REPORT: NORMAL

## 2025-06-04 PROCEDURE — 99999 PR PBB SHADOW E&M-EST. PATIENT-LVL II: CPT | Mod: PBBFAC,,,

## 2025-06-04 PROCEDURE — 1159F MED LIST DOCD IN RCRD: CPT | Mod: CPTII,S$GLB,,

## 2025-06-04 PROCEDURE — 99204 OFFICE O/P NEW MOD 45 MIN: CPT | Mod: S$GLB,,,

## 2025-06-04 PROCEDURE — 1160F RVW MEDS BY RX/DR IN RCRD: CPT | Mod: CPTII,S$GLB,,

## 2025-06-04 PROCEDURE — 3044F HG A1C LEVEL LT 7.0%: CPT | Mod: CPTII,S$GLB,,

## 2025-06-06 ENCOUNTER — TELEPHONE (OUTPATIENT)
Dept: SURGERY | Facility: CLINIC | Age: 45
End: 2025-06-06
Payer: COMMERCIAL

## 2025-06-08 NOTE — PROGRESS NOTES
[facility]  History & Physical  Orthopedics    Subjective:      Chief Complaint: Pain of the Right Hand    Referring Provider: Nicolas Escalante MD     History of Present Illness:  Lauren Schmid is a 44 y.o. 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 She locates the pain and swelling on the index MCP joint, most prominent ulnarly. Pain fluctuates in severity, and it is not painful at this current moment. She describes sudden sharp, hot pains in the index MCPJ throughout the day, even when at rest. She reports occasional feelings of heat in the area, along with intermittent redness and swelling. She has difficulty pinching items with any weight or resistance, such as turning a key in a lock and holding a fork, describing it as significantly painful. Activities such as picking up a chip to eat chips and salsa have become difficult. She also has pain when holding a tray on her palm with fingers extended and spread out. She has begun using the left hand more frequently and is starting to feel mild symptoms in the left hand as well, though not as severe as in the right. Patient denies any clicking, catching, triggering, or locking of the finger. She denies any radiating pains, numbness, or tingling.    Patient reports she sought care at urgent care about 2 weeks ago and was given an anti-inflammatory shot, which provided temporary relief for about 48 hours. She was advised to use warm compresses and squeeze a tennis ball 3 times daily. An XR was performed, initially ruling out a stress fracture. However, she was later called back due to a concerning finding in the wrist, described as two bones being too far apart or spread. A wrist brace was given, which does  not help the finger pain and causes irritation. Patient denies any wrist pain or limitations in wrist motion. She denies any prior wrist traumas, injuries, or surgeries.     She discloses a recent breast cancer diagnosis and upcoming surgery on June 12, causing significant stress.    WORK STATUS:  - Works as a manager at [employer]  - Work activities cause pain in right hand, particularly index finger  - Has started using left hand more for tasks   - Will be off work from June 12th to June 21st due to upcoming surgery    Patient denies any prior history of trauma or surgeries to the wrist or hand.    The patient denies any fevers, chills, N/V, D/C and presents for evaluation.    Vitals:    06/04/25 1111   PainSc: 0-No pain   PainLoc: Hand     Review of Systems:  As per HPI otherwise noncontributory     Past Medical History:   Diagnosis Date    Breast cancer     Herpes simplex virus (HSV) infection     Plantar fasciitis of right foot 10/2022     Past Surgical History:   Procedure Laterality Date    MOUTH SURGERY N/A 04/07/2025     Family History   Problem Relation Name Age of Onset    Breast cancer Neg Hx      Colon cancer Neg Hx      Ovarian cancer Neg Hx       Social History     Socioeconomic History    Marital status:    Tobacco Use    Smoking status: Never    Smokeless tobacco: Never   Substance and Sexual Activity    Alcohol use: Yes    Drug use: No    Sexual activity: Yes     Partners: Male     Birth control/protection: OCP     Social Drivers of Health     Financial Resource Strain: High Risk (3/24/2025)    Overall Financial Resource Strain (CARDIA)     Difficulty of Paying Living Expenses: Hard   Food Insecurity: Food Insecurity Present (3/24/2025)    Hunger Vital Sign     Worried About Running Out of Food in the Last Year: Sometimes true     Ran Out of Food in the Last Year: Never true   Transportation Needs: No Transportation Needs (3/24/2025)    PRAPARE - Transportation     Lack of  Transportation (Medical): No     Lack of Transportation (Non-Medical): No   Physical Activity: Sufficiently Active (3/24/2025)    Exercise Vital Sign     Days of Exercise per Week: 7 days     Minutes of Exercise per Session: 60 min   Stress: No Stress Concern Present (3/24/2025)    [employer] [place] [employer] - Occupational Stress Questionnaire     Feeling of Stress : Only a little   Housing Stability: High Risk (3/24/2025)    Housing Stability Vital Sign     Unable to Pay for Housing in the Last Year: Yes     Number of Times Moved in the Last Year: 0     Homeless in the Last Year: No       Current Medications[1]  Review of patient's allergies indicates:   Allergen Reactions    Codeine Other (See Comments)     vomitting       Objective:   Vital Signs (Most Recent):  There were no vitals filed for this visit.  There is no height or weight on file to calculate BMI.    Physical Exam:  Constitutional: The patient appears well-developed and well-nourished. No distress.   Skin: No lesions appreciated  Head: Normocephalic and atraumatic.   Nose: Nose normal.   Ears: No deformities seen  Eyes: Conjunctivae and EOM are normal.   Neck: No tracheal deviation present.   Cardiovascular: Normal rate and intact distal pulses.    Pulmonary/Chest: Effort normal. No respiratory distress.   Abdominal: There is no guarding.   Neurological: The patient is alert.   Psychiatric: The patient has a normal mood and affect.     Bilateral Hand/Wrist/Elbow Examination:    Observation/Inspection:  Swelling  Mild, right index dorsal MCPJ    Deformity  none  Discoloration  No erythema or ecchymosis     Scars   none    Atrophy  none    HAND/WRIST/ELBOW EXAMINATION:  Right index finger:  Mild edema dorsal MCP joint. TTP over ulnar aspect of dorsal MCP joint. No warmth, erythema, or ecchymosis. Mild TTP over A1 pulley. Full active/passive flexion and extension of right index finger with pain upon finger flexion, no active catching,  triggering, or locking. No instability of MCP collateral ligaments with varus/valgus stress testing. Flexor and extensor tendon function intact. Able to make full composite fist.   Right wrist: Non-tender to palpation. No tenderness over scapholunate interval. Full and painless wrist ROM. No wrist instability.   Bilateral ROM hand/wrist/elbow full    Finkelstein's Test   Neg  WHAT Test    Neg  CMC grind    Neg  Circumduction test   Neg  Snuff box Tenderness   Neg  [place]'s Test    Neg  TFCC Compression Test  Neg  Hook of [place] Tenderness  Neg  Median Nerve Compression Test Neg  Tinel's Test - Carpal Tunnel  Neg  Phalen's Test    Neg  Tinel's Test - Cubital Tunnel  Neg  Elbow Flexion Test    Neg    Neurovascular Exam:  Digits WWP, brisk CR < 3s throughout  NVI motor/LTS to M/R/U nerves, radial pulse 2+  2+ biceps and brachioradialis reflexes    Diagnostics Review:   Imaging: I independently viewed the patient's imaging as well as the radiology report.    My personal interpretation of X-rays AP, lateral, oblique right hand taken 5/22/2025 demonstrate widening of scapholunate interval with well preserved cartilage spaces and no acute fracture or dislocation.    Assessment:   44 y.o. yo female with   Encounter Diagnoses   Name Primary?    Sprain of metacarpophalangeal (MCP) joint of right index finger, initial encounter Yes    Pain involving joint of finger of right hand       Plan:   The patient and I had a thorough discussion today. We discussed the working diagnosis as well as several other potential alternative diagnoses. Treatment options were discussed, both conservative and surgical. Conservative treatment options would include things such as activity modifications, workplace modifications, a period of rest, ice/heat, oral vs topical OTC and prescription anti-inflammatory medications, acetaminophen, occupational therapy to include strengthening and range of motion exercises, splinting/bracing, immobilization,  corticosteroid injections for temporary relief. Surgical options were discussed as well.     I have recommended a period of rest, anti-inflammatories, ice/heat, and occupational therapy for right index finger MCP joint sprain. Patient declines OT referral at this time due to time constraints related to breast cancer diagnosis. Advised patient to rest the finger/hand as much as possible and avoid activities that involve hyperextending the finger, heavy gripping, or squeezing. I recommend massaging the joint with voltaren gel (topical NSAID) 3-4 times per day to decrease inflammation; discontinue all topical and oral NSAIDs 7 days prior to upcoming surgery. We discussed potential steroid injection if symptoms persist.   We discussed incidental finding of scapholunate interval widening on x-ray. Patient is currently asymptomatic at wrist and denies prior wrist injury or trauma. We can order MRI for further evaluation if she develops symptoms. Discontinue wrist brace.   Follow up as needed  Call with any questions/concerns in the interim    Should the patient's symptoms worsen, persist, or fail to improve they should return for reevaluation.     The patient's pathophysiology was explained in detail with reference to x-rays, models, other visual aids as appropriate. Treatment options were discussed in detail.  Questions were invited and answered to the patient's satisfaction. I reviewed Primary care and other specialty's notes to better coordinate patient's care.    Gayathri Ragland PA-C  [facility]     This note was generated with the assistance of ambient listening technology. Verbal consent was obtained by the patient and accompanying visitor(s) for the recording of patient appointment to facilitate this note. I attest to having reviewed and edited the generated note for accuracy, though some syntax or spelling errors may persist. Please contact the author of this note for any clarification.     Please  be aware that this note has been generated with the assistance of [employer] voice-to-text.  Please excuse any spelling or grammatical errors.         [1]   Current Outpatient Medications   Medication Sig Dispense Refill    hydrOXYzine pamoate (VISTARIL) 25 MG Cap Take 1 capsule (25 mg total) by mouth daily as needed. 30 capsule 0    ibuprofen (ADVIL,MOTRIN) 600 MG tablet Take 600 mg by mouth every 6 (six) hours as needed.      methylPREDNISolone (MEDROL DOSEPACK) 4 mg tablet use as directed 1 each 0    rizatriptan (MAXALT) 10 MG tablet Take one tablet (10 mg) at the onset of headaches; if symptoms persist or return, may repeat dose after 2 hours. maximum dose: 20 mg per 24 hours 8 tablet 5     No current facility-administered medications for this visit.

## 2025-06-09 ENCOUNTER — CLINICAL SUPPORT (OUTPATIENT)
Dept: OBSTETRICS AND GYNECOLOGY | Facility: CLINIC | Age: 45
End: 2025-06-09
Payer: COMMERCIAL

## 2025-06-09 DIAGNOSIS — N95.1 MENOPAUSAL SYMPTOMS: Primary | ICD-10-CM

## 2025-06-09 NOTE — PROGRESS NOTES
Personal Information    Full Name: Lauren Schmid 1980    PCP- Dr. Nicolas Escalante     Medical History    Do you have a chronic medical condition? (e.g., diabetes, hypertension, thyroid issues)   If yes, please specify:   No    2.   Do you have a history of hormone- related conditions? (e.g., endometriosis, breast cancer, or PCOS)   If yes, please explain:   Yes - Breast Cancer - 5/2025 Left breast - Er(+) Pr(+). Left breast lumpectomy - 6/12/2025    3.   Have you previously or are you currently taking hormone replacement therapy?   If yes, please list:   No    Menstrual History    At what age did you begin menstruating?   16    2.   Have you experienced any changes in your menstrual cycle in the last year?   If yes, please describe:   Yes - OCP- continuous use-     3.   When was your last menstrual period or age of last period?   6/4/2025    4.   Have you had a hysterectomy or ablation?   No    5.   Do you still have your ovaries?   If yes, month or year:  N/A    Symptoms Assesments      Are you experiencing any of the following symptoms:  Hot Flashes: No   Night Sweats: No   Vaginal Dryness or Pain/ Discomfort with [place]: No   Mood Swings: No   Anxiety or Depression: Yes   Sleep Disturbances: No   Fatigue: No   Weight Gain: No   Joint or Muscle Pain: No   Memory Issues, Brain Fog or Loss of Focus: No   Decreased Interest in Sex (Low Libido): No     Lifestyle Factors    How would you describe your diet?  Balanced    2.   How often do you exercise?   Regularly    3.   Do you smoke or consume alcohol?   If yes, please specify frequency:   Yes - Does not smoke - Social drinker     4.   How would you rate your stress levels?   Moderate    Family History    Is there a family history of menopause-related conditions? (e.g., osteoporosis, breast cancer)  If yes, please specify  No    2.   Is there a family history of heart disease?   If yes, please specify   No    3.   Have you had a colonoscopy?  If yes, month  or year:  No    4.   Have you had a mammogram?  If yes, month or year:  Yes - 5/2025 & MRI 5/2025    5.   Have you had an Annual/ Pap?   If yes, month or year:   Yes - 10/2022    6.   BMD (If patient is over 50)    N/A  ---------------------------------------------  Spoke with patient for a total of 30 minutes during virtual visit.  Patient was guided through expectations and treatment options.     Questions answered. Encouraged to send message or call office with any questions/concerns. Verbalized understanding.       Rosa Isela

## 2025-06-10 ENCOUNTER — HOSPITAL ENCOUNTER (OUTPATIENT)
Dept: RADIOLOGY | Facility: OTHER | Age: 45
Discharge: HOME | End: 2025-06-10
Payer: COMMERCIAL

## 2025-06-10 DIAGNOSIS — R10.12 LUQ ABDOMINAL PAIN: ICD-10-CM

## 2025-06-10 PROCEDURE — 76700 US EXAM ABDOM COMPLETE: CPT | Mod: TC

## 2025-06-10 PROCEDURE — 76700 US EXAM ABDOM COMPLETE: CPT | Mod: 26,,, | Performed by: RADIOLOGY

## 2025-06-10 NOTE — PRE-PROCEDURE INSTRUCTIONS
Information to Prepare you for your Surgery    PRE-ADMIT TESTING -  [phone]    [address] [facility]          Your surgery has been scheduled at [facility]. We are pleased to have the opportunity to serve you. For Further Information please call [phone].    On the day of surgery please report to the Information Desk on the 1st floor.    CONTACT YOUR PHYSICIAN'S OFFICE THE DAY PRIOR TO YOUR SURGERY TO OBTAIN YOUR ARRIVAL TIME.     The evening before surgery do not eat anything after 9 p.m. ( this includes hard candy, chewing gum and mints).  You may only have GATORADE, POWERADE AND WATER  from 9 p.m. until you leave your home.   DO NOT DRINK ANY LIQUIDS ON THE WAY TO THE HOSPITAL.      Why does your anesthesiologist allow you to drink Gatorade/Powerade before surgery?  Gatorade/Powerade helps to increase your comfort before surgery and to decrease your nausea after surgery. The carbohydrates in Gatorade/Powerade help reduce your body's stress response to surgery.  If you are a diabetic-drink only water prior to surgery.    Outpatient Surgery- May allow 2 adult (18 and older) Support Persons (1 being the designated ) for all surgical/procedural patients. A breastfeeding mother will be allowed her infant and 2 adult Support Persons. No one under the age of 18 will be allowed in the building.       MEDICATION INSTRUCTIONS:     Take the below medicines morning of surgery:    None     If you take ASPIRIN - Your PHYSICIAN/SURGEON will inform you IF/OR when you need to stop taking aspirin prior to your surgery.     The week prior to surgery do not ot take any medications containing IBUPROFEN or NSAIDS ( Advil, Motrin, Goodys, BC, Aleve, Naproxen etc) If you are not sure if you should take a medicine please call your surgeon's office.  Ok to take Tylenol    Do Not Wear any make-up (especially eye make-up) to surgery. Please remove any false eyelashes or eyelash extensions.  If you arrive the day of surgery with makeup/eyelashes on you will be required to remove prior to surgery. (There is a risk of corneal abrasions if eye makeup/eyelash extensions are not removed)      Leave all valuables at home.   Do Not wear any jewelry or watches, including any metal in body piercings. Jewelry must be removed prior to coming to the hospital.  There is a possibility that rings that are unable to be removed may be cut off if they are on the surgical extremity.    Please remove all hair extensions, wigs, clips and any other metal accessories/ ornaments from your hair.  These items may pose a flammable/fire risk in Surgery and must be removed.    Do not shave your surgical area at least 5 days prior to your surgery. The surgical prep will be performed at the hospital according to Infection Control regulations.    Contact Lens must be removed before surgery. Either do not wear the contact lens or bring a case and solution for storage.  Please bring a container for eyeglasses or dentures as required.  Bring any paperwork your physician has provided, such as consent forms,  history and physicals, doctor's orders, etc.   Bring comfortable clothes that are loose fitting to wear upon discharge. Take into consideration the type of surgery being performed.  Maintain your diet as advised per your physician the day prior to surgery.      Adequate rest the night before surgery is advised.   Park in the Parking lot behind the hospital or in the [facility] across the street from the parking lot. Parking is complimentary.  If you will be discharged the same day as your procedure, please arrange for a responsible adult to drive you home or to accompany you if traveling by taxi.   YOU WILL NOT BE PERMITTED TO DRIVE OR TO LEAVE THE HOSPITAL ALONE AFTER SURGERY.   If you are being discharged the same day, it is strongly recommended that you arrange for someone to remain with you for the first 24 hrs  following your surgery.    The Surgeon will speak to your family/visitor after your surgery regarding the outcome of your surgery and post op care.  The Surgeon may speak to you after your surgery, but there is a possibility you may not remember the details.  Please check with your family members regarding the conversation with the Surgeon.    We strongly recommend whoever is bringing you home be present for discharge instructions.  This will ensure a thorough understanding for your post op home care.    If the patient has fever, cough, or signs/symptoms of Flu or Covid please do not come in for your surgery. Contact your surgeon and your primary care physician for further instructions.           Thank you for your cooperation.  The Staff of [facility].            Bathing Instructions with Hibiclens    Shower the evening before and morning of your procedure with Chlorhexidine (Hibiclens)  do not use Chlorhexidine on your face or genitals. Do not get in your eyes.  Wash your face with water and your regular face wash/soap  Use your regular shampoo  Apply Chlorhexidine (Hibiclens) directly on your skin or on a wet washcloth and wash gently. When showering: Move away from the shower stream when applying Chlorhexidine (Hibiclens) to avoid rinsing off too soon.  Rinse thoroughly with warm water  Do not dilute Chlorhexidine (Hibiclens)   Dry off as usual, do not use any deodorant, powder, body lotions, perfume, after shave or cologne.

## 2025-06-11 ENCOUNTER — TELEPHONE (OUTPATIENT)
Dept: SURGERY | Facility: CLINIC | Age: 45
End: 2025-06-11
Payer: COMMERCIAL

## 2025-06-11 NOTE — TELEPHONE ENCOUNTER
Spoke to patient to give surgery arrival time of 0930 tomorrow at [facility].  Pt verbalized understanding of arrival time and location.  Patient had no other concerns at this time.

## 2025-06-12 ENCOUNTER — HOSPITAL ENCOUNTER (OUTPATIENT)
Dept: RADIOLOGY | Facility: OTHER | Age: 45
Discharge: HOME | End: 2025-06-12
Attending: SURGERY
Payer: COMMERCIAL

## 2025-06-12 ENCOUNTER — HOSPITAL ENCOUNTER (OUTPATIENT)
Facility: OTHER | Age: 45
Discharge: HOME | End: 2025-06-12
Attending: SURGERY | Admitting: SURGERY
Payer: COMMERCIAL

## 2025-06-12 ENCOUNTER — PATIENT MESSAGE (OUTPATIENT)
Dept: INTERNAL MEDICINE | Facility: CLINIC | Age: 45
End: 2025-06-12
Payer: COMMERCIAL

## 2025-06-12 ENCOUNTER — ANESTHESIA (OUTPATIENT)
Dept: SURGERY | Facility: OTHER | Age: 45
End: 2025-06-12
Payer: COMMERCIAL

## 2025-06-12 VITALS
SYSTOLIC BLOOD PRESSURE: 130 MMHG | TEMPERATURE: 97 F | WEIGHT: 140 LBS | BODY MASS INDEX: 23.9 KG/M2 | RESPIRATION RATE: 18 BRPM | DIASTOLIC BLOOD PRESSURE: 80 MMHG | HEIGHT: 64 IN | HEART RATE: 88 BPM | OXYGEN SATURATION: 98 %

## 2025-06-12 DIAGNOSIS — Z17.0 MALIGNANT NEOPLASM OF OVERLAPPING SITES OF LEFT BREAST IN FEMALE, ESTROGEN RECEPTOR POSITIVE: ICD-10-CM

## 2025-06-12 DIAGNOSIS — C50.812 MALIGNANT NEOPLASM OF OVERLAPPING SITES OF LEFT BREAST IN FEMALE, ESTROGEN RECEPTOR POSITIVE: ICD-10-CM

## 2025-06-12 DIAGNOSIS — F41.9 ANXIETY: ICD-10-CM

## 2025-06-12 DIAGNOSIS — Z17.0 MALIGNANT NEOPLASM OF OVERLAPPING SITES OF LEFT BREAST IN FEMALE, ESTROGEN RECEPTOR POSITIVE: Primary | ICD-10-CM

## 2025-06-12 DIAGNOSIS — C50.812 MALIGNANT NEOPLASM OF OVERLAPPING SITES OF LEFT BREAST IN FEMALE, ESTROGEN RECEPTOR POSITIVE: Primary | ICD-10-CM

## 2025-06-12 LAB
B-HCG UR QL: NEGATIVE
CTP QC/QA: YES

## 2025-06-12 PROCEDURE — 11200 RMVL SKIN TAGS UP TO&INC 15: CPT | Mod: 51,,, | Performed by: SURGERY

## 2025-06-12 PROCEDURE — 71000016 HC POSTOP RECOV ADDL HR: Performed by: SURGERY

## 2025-06-12 PROCEDURE — 88307 TISSUE EXAM BY PATHOLOGIST: CPT | Mod: 26,,, | Performed by: PATHOLOGY

## 2025-06-12 PROCEDURE — 36000706: Performed by: SURGERY

## 2025-06-12 PROCEDURE — A9520 TC99 TILMANOCEPT DIAG 0.5MCI: HCPCS | Performed by: SURGERY

## 2025-06-12 PROCEDURE — 38525 BIOPSY/REMOVAL LYMPH NODES: CPT | Mod: 51,LT,, | Performed by: SURGERY

## 2025-06-12 PROCEDURE — 88342 IMHCHEM/IMCYTCHM 1ST ANTB: CPT | Mod: 26,,, | Performed by: PATHOLOGY

## 2025-06-12 PROCEDURE — 63600175 PHARM REV CODE 636 W HCPCS: Performed by: NURSE ANESTHETIST, CERTIFIED REGISTERED

## 2025-06-12 PROCEDURE — 88305 TISSUE EXAM BY PATHOLOGIST: CPT | Mod: 26,,, | Performed by: PATHOLOGY

## 2025-06-12 PROCEDURE — 19301 PARTIAL MASTECTOMY: CPT | Mod: LT,,, | Performed by: SURGERY

## 2025-06-12 PROCEDURE — 88341 IMHCHEM/IMCYTCHM EA ADD ANTB: CPT | Mod: 26,,, | Performed by: PATHOLOGY

## 2025-06-12 PROCEDURE — 71000039 HC RECOVERY, EACH ADD'L HOUR: Performed by: SURGERY

## 2025-06-12 PROCEDURE — 81025 URINE PREGNANCY TEST: CPT

## 2025-06-12 PROCEDURE — 38900 IO MAP OF SENT LYMPH NODE: CPT | Mod: LT,,, | Performed by: SURGERY

## 2025-06-12 PROCEDURE — 25000003 PHARM REV CODE 250: Performed by: ANESTHESIOLOGY

## 2025-06-12 PROCEDURE — 63600175 PHARM REV CODE 636 W HCPCS: Performed by: SURGERY

## 2025-06-12 PROCEDURE — 37000008 HC ANESTHESIA 1ST 15 MINUTES: Performed by: SURGERY

## 2025-06-12 PROCEDURE — 36000707: Performed by: SURGERY

## 2025-06-12 PROCEDURE — 88304 TISSUE EXAM BY PATHOLOGIST: CPT | Mod: 26,,, | Performed by: PATHOLOGY

## 2025-06-12 PROCEDURE — 25000003 PHARM REV CODE 250

## 2025-06-12 PROCEDURE — 71000033 HC RECOVERY, INTIAL HOUR: Performed by: SURGERY

## 2025-06-12 PROCEDURE — 27201423 OPTIME MED/SURG SUP & DEVICES STERILE SUPPLY: Performed by: SURGERY

## 2025-06-12 PROCEDURE — 88342 IMHCHEM/IMCYTCHM 1ST ANTB: CPT | Mod: TC,91 | Performed by: SURGERY

## 2025-06-12 PROCEDURE — 71000015 HC POSTOP RECOV 1ST HR: Performed by: SURGERY

## 2025-06-12 PROCEDURE — 37000009 HC ANESTHESIA EA ADD 15 MINS: Performed by: SURGERY

## 2025-06-12 RX ORDER — DEXAMETHASONE SODIUM PHOSPHATE 4 MG/ML
INJECTION, SOLUTION INTRA-ARTICULAR; INTRALESIONAL; INTRAMUSCULAR; INTRAVENOUS; SOFT TISSUE
Status: DISCONTINUED | OUTPATIENT
Start: 2025-06-12 | End: 2025-06-12

## 2025-06-12 RX ORDER — ACETAMINOPHEN 500 MG
1000 TABLET ORAL 2 TIMES DAILY
Qty: 20 TABLET | Refills: 0 | Status: SHIPPED | OUTPATIENT
Start: 2025-06-12 | End: 2025-06-17

## 2025-06-12 RX ORDER — PROCHLORPERAZINE EDISYLATE 5 MG/ML
5 INJECTION INTRAMUSCULAR; INTRAVENOUS EVERY 30 MIN PRN
Status: DISCONTINUED | OUTPATIENT
Start: 2025-06-12 | End: 2025-06-12 | Stop reason: HOSPADM

## 2025-06-12 RX ORDER — GLUCAGON 1 MG
1 KIT INJECTION
Status: DISCONTINUED | OUTPATIENT
Start: 2025-06-12 | End: 2025-06-12 | Stop reason: HOSPADM

## 2025-06-12 RX ORDER — FENTANYL CITRATE 50 UG/ML
INJECTION, SOLUTION INTRAMUSCULAR; INTRAVENOUS
Status: DISCONTINUED | OUTPATIENT
Start: 2025-06-12 | End: 2025-06-12

## 2025-06-12 RX ORDER — OXYCODONE HYDROCHLORIDE 5 MG/1
5 TABLET ORAL EVERY 4 HOURS PRN
Status: DISCONTINUED | OUTPATIENT
Start: 2025-06-12 | End: 2025-06-12 | Stop reason: HOSPADM

## 2025-06-12 RX ORDER — ACETAMINOPHEN 500 MG
1000 TABLET ORAL
Status: COMPLETED | OUTPATIENT
Start: 2025-06-12 | End: 2025-06-12

## 2025-06-12 RX ORDER — SODIUM CHLORIDE 9 MG/ML
INJECTION, SOLUTION INTRAVENOUS CONTINUOUS
Status: DISCONTINUED | OUTPATIENT
Start: 2025-06-12 | End: 2025-06-12 | Stop reason: HOSPADM

## 2025-06-12 RX ORDER — ONDANSETRON HYDROCHLORIDE 2 MG/ML
INJECTION, SOLUTION INTRAVENOUS
Status: DISCONTINUED | OUTPATIENT
Start: 2025-06-12 | End: 2025-06-12

## 2025-06-12 RX ORDER — PROPOFOL 10 MG/ML
INJECTION, EMULSION INTRAVENOUS
Status: DISCONTINUED | OUTPATIENT
Start: 2025-06-12 | End: 2025-06-12

## 2025-06-12 RX ORDER — BUPIVACAINE HYDROCHLORIDE 2.5 MG/ML
INJECTION, SOLUTION EPIDURAL; INFILTRATION; INTRACAUDAL; PERINEURAL
Status: DISCONTINUED | OUTPATIENT
Start: 2025-06-12 | End: 2025-06-12 | Stop reason: HOSPADM

## 2025-06-12 RX ORDER — MIDAZOLAM HYDROCHLORIDE 1 MG/ML
INJECTION INTRAMUSCULAR; INTRAVENOUS
Status: DISCONTINUED | OUTPATIENT
Start: 2025-06-12 | End: 2025-06-12

## 2025-06-12 RX ORDER — SODIUM CHLORIDE, SODIUM LACTATE, POTASSIUM CHLORIDE, CALCIUM CHLORIDE 600; 310; 30; 20 MG/100ML; MG/100ML; MG/100ML; MG/100ML
INJECTION, SOLUTION INTRAVENOUS CONTINUOUS
Status: DISCONTINUED | OUTPATIENT
Start: 2025-06-12 | End: 2025-06-12 | Stop reason: HOSPADM

## 2025-06-12 RX ORDER — ONDANSETRON 8 MG/1
8 TABLET, ORALLY DISINTEGRATING ORAL EVERY 6 HOURS PRN
Qty: 10 TABLET | Refills: 0 | Status: SHIPPED | OUTPATIENT
Start: 2025-06-12

## 2025-06-12 RX ORDER — OXYCODONE HYDROCHLORIDE 5 MG/1
5 TABLET ORAL EVERY 6 HOURS PRN
Qty: 10 TABLET | Refills: 0 | Status: SHIPPED | OUTPATIENT
Start: 2025-06-12 | End: 2025-06-19

## 2025-06-12 RX ORDER — METOPROLOL TARTRATE 1 MG/ML
1 INJECTION, SOLUTION INTRAVENOUS EVERY 5 MIN PRN
Status: DISCONTINUED | OUTPATIENT
Start: 2025-06-12 | End: 2025-06-12 | Stop reason: HOSPADM

## 2025-06-12 RX ORDER — HYDROMORPHONE HYDROCHLORIDE 2 MG/ML
0.4 INJECTION, SOLUTION INTRAMUSCULAR; INTRAVENOUS; SUBCUTANEOUS EVERY 5 MIN PRN
Status: DISCONTINUED | OUTPATIENT
Start: 2025-06-12 | End: 2025-06-12 | Stop reason: HOSPADM

## 2025-06-12 RX ORDER — CEFAZOLIN 2 G/1
2 INJECTION, POWDER, FOR SOLUTION INTRAMUSCULAR; INTRAVENOUS
Status: COMPLETED | OUTPATIENT
Start: 2025-06-12 | End: 2025-06-12

## 2025-06-12 RX ORDER — SODIUM CHLORIDE, SODIUM LACTATE, POTASSIUM CHLORIDE, CALCIUM CHLORIDE 600; 310; 30; 20 MG/100ML; MG/100ML; MG/100ML; MG/100ML
INJECTION, SOLUTION INTRAVENOUS CONTINUOUS PRN
Status: DISCONTINUED | OUTPATIENT
Start: 2025-06-12 | End: 2025-06-12

## 2025-06-12 RX ORDER — LIDOCAINE HYDROCHLORIDE 20 MG/ML
INJECTION INTRAVENOUS
Status: DISCONTINUED | OUTPATIENT
Start: 2025-06-12 | End: 2025-06-12

## 2025-06-12 RX ORDER — NAPROXEN 500 MG/1
500 TABLET ORAL 2 TIMES DAILY WITH MEALS
Qty: 10 TABLET | Refills: 0 | Status: SHIPPED | OUTPATIENT
Start: 2025-06-12 | End: 2025-06-17

## 2025-06-12 RX ORDER — LIDOCAINE HYDROCHLORIDE 10 MG/ML
0.5 INJECTION, SOLUTION EPIDURAL; INFILTRATION; INTRACAUDAL; PERINEURAL ONCE
Status: DISCONTINUED | OUTPATIENT
Start: 2025-06-12 | End: 2025-06-12 | Stop reason: HOSPADM

## 2025-06-12 RX ORDER — HYDROXYZINE PAMOATE 25 MG/1
25 CAPSULE ORAL DAILY PRN
Qty: 90 CAPSULE | Refills: 1 | Status: SHIPPED | OUTPATIENT
Start: 2025-06-12

## 2025-06-12 RX ORDER — SODIUM CHLORIDE 0.9 % (FLUSH) 0.9 %
3 SYRINGE (ML) INJECTION
Status: DISCONTINUED | OUTPATIENT
Start: 2025-06-12 | End: 2025-06-12 | Stop reason: HOSPADM

## 2025-06-12 RX ORDER — DIPHENHYDRAMINE HYDROCHLORIDE 50 MG/ML
12.5 INJECTION, SOLUTION INTRAMUSCULAR; INTRAVENOUS EVERY 30 MIN PRN
Status: DISCONTINUED | OUTPATIENT
Start: 2025-06-12 | End: 2025-06-12 | Stop reason: HOSPADM

## 2025-06-12 RX ORDER — ONDANSETRON 8 MG/1
8 TABLET, ORALLY DISINTEGRATING ORAL ONCE
Status: COMPLETED | OUTPATIENT
Start: 2025-06-12 | End: 2025-06-12

## 2025-06-12 RX ORDER — PHENYLEPHRINE HYDROCHLORIDE 10 MG/ML
INJECTION INTRAVENOUS
Status: DISCONTINUED | OUTPATIENT
Start: 2025-06-12 | End: 2025-06-12

## 2025-06-12 RX ADMIN — ONDANSETRON HYDROCHLORIDE 4 MG: 2 INJECTION INTRAMUSCULAR; INTRAVENOUS at 01:06

## 2025-06-12 RX ADMIN — FENTANYL CITRATE 100 MCG: 50 INJECTION, SOLUTION INTRAMUSCULAR; INTRAVENOUS at 01:06

## 2025-06-12 RX ADMIN — ONDANSETRON 8 MG: 8 TABLET, ORALLY DISINTEGRATING ORAL at 07:06

## 2025-06-12 RX ADMIN — FENTANYL CITRATE 50 MCG: 50 INJECTION, SOLUTION INTRAMUSCULAR; INTRAVENOUS at 02:06

## 2025-06-12 RX ADMIN — MIDAZOLAM HYDROCHLORIDE 2 MG: 1 INJECTION, SOLUTION INTRAMUSCULAR; INTRAVENOUS at 12:06

## 2025-06-12 RX ADMIN — CEFAZOLIN 2 G: 2 INJECTION, POWDER, FOR SOLUTION INTRAMUSCULAR; INTRAVENOUS at 01:06

## 2025-06-12 RX ADMIN — PROPOFOL 40 MG: 10 INJECTION, EMULSION INTRAVENOUS at 01:06

## 2025-06-12 RX ADMIN — METOROPROLOL TARTRATE 1 MG: 5 INJECTION, SOLUTION INTRAVENOUS at 04:06

## 2025-06-12 RX ADMIN — PHENYLEPHRINE HYDROCHLORIDE 100 MCG: 10 INJECTION INTRAVENOUS at 02:06

## 2025-06-12 RX ADMIN — FENTANYL CITRATE 50 MCG: 50 INJECTION, SOLUTION INTRAMUSCULAR; INTRAVENOUS at 01:06

## 2025-06-12 RX ADMIN — OXYCODONE 5 MG: 5 TABLET ORAL at 03:06

## 2025-06-12 RX ADMIN — PHENYLEPHRINE HYDROCHLORIDE 100 MCG: 10 INJECTION INTRAVENOUS at 01:06

## 2025-06-12 RX ADMIN — DEXAMETHASONE SODIUM PHOSPHATE 8 MG: 4 INJECTION, SOLUTION INTRAMUSCULAR; INTRAVENOUS at 01:06

## 2025-06-12 RX ADMIN — GLYCOPYRROLATE 0.2 MG: 0.2 INJECTION, SOLUTION INTRAMUSCULAR; INTRAVITREAL at 01:06

## 2025-06-12 RX ADMIN — PROPOFOL 200 MG: 10 INJECTION, EMULSION INTRAVENOUS at 01:06

## 2025-06-12 RX ADMIN — TILMANOCEPT 456 MICROCURIE: KIT at 01:06

## 2025-06-12 RX ADMIN — ACETAMINOPHEN 1000 MG: 500 TABLET, FILM COATED ORAL at 10:06

## 2025-06-12 RX ADMIN — SODIUM CHLORIDE, SODIUM LACTATE, POTASSIUM CHLORIDE, AND CALCIUM CHLORIDE: 600; 310; 30; 20 INJECTION, SOLUTION INTRAVENOUS at 12:06

## 2025-06-12 RX ADMIN — LIDOCAINE HYDROCHLORIDE 50 MG: 20 INJECTION, SOLUTION INTRAVENOUS at 01:06

## 2025-06-12 NOTE — TRANSFER OF CARE
"Anesthesia Transfer of Care Note    Patient: Lauren Schmid    Procedure(s) Performed: Procedure(s) (LRB):  MASTECTOMY, PARTIAL-left with radiological marker (Left)  BIOPSY, SENTINEL LYMPH NODE (Left)  INJECTION, FOR SENTINEL NODE IDENTIFICATION (Left)    Patient location: PACU    Anesthesia Type: general    Transport from OR: Transported from OR on 6-10 L/min O2 by face mask with adequate spontaneous ventilation    Post pain: adequate analgesia    Post assessment: no apparent anesthetic complications    Post vital signs: stable    Level of consciousness: awake and sedated    Nausea/Vomiting: no nausea/vomiting    Complications: none    Transfer of care protocol was followed      Last vitals: Visit Vitals  BP (!) 151/95   Pulse 90   Temp 36.8 °C (98.2 °F) (Oral)   Resp 18   Ht 5' 4" (1.626 m)   Wt 63.5 kg (140 lb)   LMP 06/04/2025 (Exact Date)   SpO2 100%   Breastfeeding No   BMI 24.03 kg/m²     "

## 2025-06-12 NOTE — DISCHARGE INSTRUCTIONS
POSTOPERATIVE INSTRUCTIONS FOLLOWING BIOPSY OR LUMPECTOMY    The following are post-operative instructions that will help you to recover from your surgery.  Please read over these instructions carefully and contact us if we can answer any of your questions or concerns.    Dressing/breast binder (surgi-bra)  A surgical bra will be placed around your chest after your surgery.  Please wear it as close to 24 hours a day as possible until your post-operative clinic appointment.  If the elastic around the bra irritates your skin, you may wear a soft t-shirt underneath the bra.    You may go without wearing the bra long enough to shower, to launder and dry the bra.  If the bra is extremely uncomfortable, you may wear a supportive sports bra instead after 2 days.  You may shower the day after surgery.  The incision can get wet in the shower but do not take a tub bath and do not soak the surgical site. Pat the incision dry.     Activity   You should be able to return to your regular activities 2 days after your surgery.  However, do not engage in strenuous activities in which you use your upper body such as: golf, tennis, aerobics, washing windows, raking the yard, mopping, vacuuming, heavy lifting (e.g children) until you are seen for your follow-up appointment in clinic.    Medication for pain  You may find that over the counter pain medications may be sufficient for your pain (tylenol or ibuprofen).  You may be given a prescription for pain medication for more severe pain.  You should not drive or operate machinery while taking these.  Please take narcotics with food.  Narcotics can cause, or worsen, constipation.  You will need to increase your fluid intake, eat high fiber foods (such as fruits and bran) and make sure that you are up and walking. You may need to take an over the counter stool softener for constipation.    Please report the following:  Temperature greater than 101 degrees  Discharge or bad odor from the  wound  Excessive bleeding, such as bloody dressing or extreme bruising  Redness at incision and/or drain sites  Swelling or buildup of fluid around incision    Additional information  I will see you approximately 2 weeks following your surgery.  If this follow-up appointment has not been made, please call the office.    If you have any questions or problems, please call my office or my nurse.    MD Wilda Sevilla, RN  [phone]    After hours and on weekends, you may call the main [facility] line at [phone] and ask to have the general surgery resident paged or have me paged.

## 2025-06-12 NOTE — OR NURSING
Ellie score of 9, patient easily arouses to voice, reports tolerable 2/10 pain, denies nausea, maintaining SPO2 on room air. Patient has met criteria and is ready for transfer.

## 2025-06-12 NOTE — ANESTHESIA PROCEDURE NOTES
Intubation    Date/Time: 6/12/2025 1:04 PM    Performed by: Roma Hansen CRNA  Authorized by: Johnathan Cotto MD    Intubation:     Induction:  Intravenous    Intubated:  Postinduction    Mask Ventilation:  Easy mask    Attempts:  1    Attempted By:  CRNA    Difficult Airway Encountered?: No      Complications:  None    Airway Device:  Supraglottic airway/LMA    Airway Device Size:  4.0    Style/Cuff Inflation:  Uncuffed    Secured at:  The lips    Placement Verified By:  Capnometry    Complicating Factors:  None    Findings Post-Intubation:  Atraumatic/condition of teeth unchanged

## 2025-06-12 NOTE — BRIEF OP NOTE
[facility] - Surgery ([place])  Brief Operative Note    Surgery Date: 6/12/2025     Surgeons and Role:     * Tamar Patterson MD - Primary    Assisting Surgeon: None    Pre-op Diagnosis:  Malignant neoplasm of overlapping sites of left breast in female, estrogen receptor positive [C50.812, Z17.0]    Post-op Diagnosis:  Post-Op Diagnosis Codes:     * Malignant neoplasm of overlapping sites of left breast in female, estrogen receptor positive [C50.812, Z17.0]    Procedure(s) (LRB):  MASTECTOMY, PARTIAL-left with radiological marker (Left)  BIOPSY, SENTINEL LYMPH NODE (Left)  INJECTION, FOR SENTINEL NODE IDENTIFICATION (Left)    Anesthesia: General    Operative Findings: left lumpectomy with sentinel lymph node biopsy    Estimated Blood Loss: Minimal         Specimens:   Specimen (24h ago, onward)      None            ID Type Source Tests Collected by Time Destination   1 : 1. LEFT AXILLARY SKIN TAG (fresh) Tissue Axilla, Left SPECIMEN TO PATHOLOGY Tamar aPtterson MD 6/12/2025 1340    2 : 2. LEFT AXILLARY SENTINEL LYMPH NODE HOT 8900 (fresh) Tissue Lymph Node(s), Axillary, Left SPECIMEN TO PATHOLOGY Tamar Patterson MD 6/12/2025 1347    3 : 3. LEFT AXILLARY SENTINEL LYMPH NODE HOT 1900 (fresh) Tissue Lymph Node(s), Axillary, Left SPECIMEN TO PATHOLOGY Tamar Patterson MD 6/12/2025 1354    4 : 4. LEFT LUMPECTOMY (GREEN-INFERIOR, BLUE-SUPERIOR, ORANGE-LATERAL, YELLOW-ANTERIOR,BLACK-POSTERIOR, RED-MEDIAL) (fresh) Tissue Breast, Left SPECIMEN TO PATHOLOGY Tamar Patterson MD 6/12/2025 1416    5 : 5. LEFT BREAST SKIN (fresh) Tissue Breast, Left SPECIMEN TO Tamar Licona MD 6/12/2025 1431    6 : 6. LEFT BREAST NEW SUPERIOR MARGIN, INK CALI NEW MARGIN (fresh) Tissue Breast, Left SPECIMEN TO Tamar Licona MD 6/12/2025 1432    7 : 7. LEFT BREAST NEW MEDIAL MARGIN, INK MARKS NEW MARGIN (fresh) Tissue Breast, Left SPECIMEN TO Tamar Licona MD 6/12/2025 1433    8 : 7. LEFT BREAST NEW INFERIOR MARGIN,  INK MARKS NEW MARGIN (fresh) Tissue Breast, Left SPECIMEN TO PATHOLOGY Tamar Patterson MD 6/12/2025 1433            Discharge Note    OUTCOME: Patient tolerated treatment/procedure well without complication and is now ready for discharge.    DISPOSITION: Home or Self Care    FINAL DIAGNOSIS:   * Malignant neoplasm of overlapping sites of left breast in female, estrogen receptor positive [C50.812, Z17.0]    FOLLOWUP: In clinic    DISCHARGE INSTRUCTIONS:    Discharge Procedure Orders   Notify your health care provider if you experience any of the following:  increased confusion or weakness     Notify your health care provider if you experience any of the following:  persistent dizziness, light-headedness, or visual disturbances     Notify your health care provider if you experience any of the following:  worsening rash     Notify your health care provider if you experience any of the following:  severe persistent headache     Notify your health care provider if you experience any of the following:  difficulty breathing or increased cough     Notify your health care provider if you experience any of the following:  redness, tenderness, or signs of infection (pain, swelling, redness, odor or green/yellow discharge around incision site)     Notify your health care provider if you experience any of the following:  severe uncontrolled pain     Notify your health care provider if you experience any of the following:  persistent nausea and vomiting or diarrhea     Notify your health care provider if you experience any of the following:  temperature >100.4     No dressing needed     Activity as tolerated

## 2025-06-12 NOTE — TELEPHONE ENCOUNTER
Refill Encounter    PCP Visits: Recent Visits  Date Type Provider Dept   05/21/25 Office Visit Nicolas Escalante MD [facility] Internal Medicine   04/14/25 Office Visit Alden Deras NP [facility] Internal Medicine   03/25/25 Office Visit Nicolas Escalante MD [facility] Internal Medicine   Showing recent visits within past 360 days and meeting all other requirements  Future Appointments  No visits were found meeting these conditions.  Showing future appointments within next 720 days and meeting all other requirements      Last 3 Blood Pressure:   BP Readings from Last 3 Encounters:   05/27/25 (!) 135/93   05/27/25 (!) 135/93   05/22/25 (!) 144/89     Preferred Pharmacy:   [facility]/pharmacy #[id number] [place], [place] - [address]  [address] [address]  Phone: [phone] Fax: [phone]      Requested RX:  Requested Prescriptions     Pending Prescriptions Disp Refills    hydrOXYzine pamoate (VISTARIL) 25 MG Cap [Pharmacy Med Name: HYDROXYZINE SIMONE 25 MG CAP] 90 capsule 1     Sig: TAKE 1 CAPSULE (25 MG TOTAL) BY MOUTH DAILY AS NEEDED.      RX Route: Normal

## 2025-06-12 NOTE — OR NURSING
Patient states she is dizzy upon ambulation.  VSS.  Respirations regular and unlabored.  Denies any co pain Patient states she would like to rest a few minutes before getting dressed to be discharged to home.

## 2025-06-12 NOTE — OP NOTE
DATE OF PROCEDURE: 6/12/2025    SURGEON: Surgeons and Role:     * Tamar Patterson MD - Primary  Resident: Alex Rai  Assist: Miriam Tan    PREOPERATIVE DIAGNOSIS: Invasive breast carcinoma of the left breast upper outer quadrant estrogen positive    POSTOPERATIVE DIAGNOSIS: same    ANESTHESIA: local and general    PROCEDURES PERFORMED:   1. left breast ultrasound with reflector localization partial mastectomy (lumpectomy) with excision for clear margins   2. left axillary deep sentinel lymph node biopsy   3. injection of left breast with technetium-labeled radiocolloid for sentinel lymph node identification  4. Identification of sentinel lymph node   5. Axillary skin tag removal        PROCEDURE IN DETAIL:   The patient underwent informed consent.  The films were reviewed.    She was then brought to the Operating Room and placed in the supine position. local and general anesthesia was administered.    The left breast was injected in the subareolar region with the technetium-labeled radiocolloid.   The left breast, anterior chest, arm and axilla were then prepped and draped in a sterile fashion.     Using the gamma probe, activity was noted and localized in the left axilla. Local anesthetic with 1% lidocaine was injected into the skin where the incision will be placed. We made a small transverse inferior axillary incision over the area of activity. We then dissected down through the clavipectoral fascia using electrocautery, identifying a hot afferent lymphatic channel coming from the upper outer quadrant axillary tail of Yee breast tissue to a level 1 sentinel node, which was excised. It was dissected circumferentially with blunt dissection and the afferent and efferent lymphatics were tied together. The lymph node was labeled as specimen #1 for permanent sectioning, hot and not blue with an ex vivo count of 8900. A total of 2 axillary sentinel lymph nodes were removed.  The probe was placed in the cavity  and there was no significant residual background radioactivity or blue dye noted in the axilla indicating adequate and appropriate sentinel lymph node biopsy. The cavity was then palpated and 0 further palpable nodes were noted. Any additional palpable nodes were sent to pathology for permanent section.     A 3mm skin tag was removed at the incision sharply and pressure was held until hemostasis was achieved.    We then achieved hemostasis and irrigation was performed.  The incision was then closed with an interuppted 3-0 vicryl deep dermal followed by a running 4-0 vicryl subcuticular.    Next, we turned our attention to the left breast itself. Ultrasound and reflector guidance was used to identify the breast mass at 12 o'clock.  The area if interest was identified with a clip within and then was marked for localization using ultrasound guidance.  An incision was made in the upper outer quadrant of the left breast over the anticipated tract of the lesion in a oswaldo-areolar location.  The specimen was dissected circumferentially around the cancer.  We did dissect all the way down to, and including the underlying pectoralis fascia.  The lumpectomy specimen was inked on the back table using green ink inferiorly, blue ink superiorly, orange ink laterally, yellow ink anteriorly, black ink posteriorly, and the red ink medially.  It was then fixed with acetic acid and submitted for specimen radiograph with the [facility], which confirmed the clip and area of interest within the specimen, and was interpreted in the operating room. Given the appearance and location of the lesion, additional margins were taken including superior, medial and inferior.       Within the lumpectomy cavity, hemostasis was achieved with cautery. The wound was irrigated until clear. There was no evidence of bleeding. It was closed in multiple layers with deep dermal and subcutaneous interrupted Vicryl sutures and a running 4-0 vicryl subcuticular skin  closure.    Dermabond was applied. Sterile fluff gauze was placed and a post-procedure bra was placed. She tolerated the procedure well without complication and was turned over to Anesthesia for transport to the recovery area in a satisfactory condition. All specimens were sent to Pathology for permanent sectioning.    ESTIMATED BLOOD LOSS: 5ml    COMPLICATIONS: none    DISPOSITION:  PACU--hemodynamically stable    ATTESTATION:  I was present and scrubbed for the entire procedure.    [place] Node Biopsy for Breast Cancer - Left  Operation performed with curative intent Yes   Tracer(s) used to identify sentinel nodes in the upfront surgery (non-neoadjuvant) setting Radioactive tracer   Tracer(s) used to identify sentinel nodes in the neoadjuvant setting N/A   All nodes (colored or non-colored) present at the end of a dye-filled lymphatic channel were removed N/A   All significantly radioactive nodes were removed Yes   All palpably suspicious nodes were removed N/A   Biopsy-proven positive nodes marked with clips prior to chemotherapy were identified and removed N/A

## 2025-06-12 NOTE — ANESTHESIA POSTPROCEDURE EVALUATION
Anesthesia Post Evaluation    Patient: Lauren Schmid    Procedure(s) Performed: Procedure(s) (LRB):  MASTECTOMY, PARTIAL-left with radiological marker (Left)  BIOPSY, SENTINEL LYMPH NODE (Left)  INJECTION, FOR SENTINEL NODE IDENTIFICATION (Left)    Final Anesthesia Type: general      Patient location during evaluation: PACU  Patient participation: Yes- Able to Participate  Level of consciousness: awake and alert  Post-procedure vital signs: reviewed and stable  Pain management: adequate  Airway patency: patent    PONV status at discharge: No PONV  Anesthetic complications: no      Cardiovascular status: blood pressure returned to baseline  Respiratory status: spontaneous ventilation  Hydration status: euvolemic  Follow-up not needed.          Vitals Value Taken Time   /72 06/12/25 16:32   Temp 37.2 °C (99 °F) 06/12/25 16:15   Pulse 90 06/12/25 16:34   Resp 16 06/12/25 16:30   SpO2 91 % 06/12/25 16:34   Vitals shown include unfiled device data.      Event Time   Out of Recovery 16:36:15         Pain/Ellie Score: Pain Rating Prior to Med Admin: 4 (6/12/2025  3:35 PM)  Pain Rating Post Med Admin: 2 (6/12/2025  3:45 PM)  Ellie Score: 9 (6/12/2025  4:30 PM)

## 2025-06-13 ENCOUNTER — RESULTS FOLLOW-UP (OUTPATIENT)
Dept: INTERNAL MEDICINE | Facility: CLINIC | Age: 45
End: 2025-06-13

## 2025-06-13 ENCOUNTER — PATIENT MESSAGE (OUTPATIENT)
Dept: HEMATOLOGY/ONCOLOGY | Facility: CLINIC | Age: 45
End: 2025-06-13
Payer: COMMERCIAL

## 2025-06-13 NOTE — PLAN OF CARE
Lauren Schmid has met all discharge criteria from Phase II. Vital Signs are stable, ambulating  without difficulty. Discharge instructions given, patient verbalized understanding. Discharged from facility via wheelchair in stable condition.

## 2025-06-16 ENCOUNTER — PATIENT MESSAGE (OUTPATIENT)
Dept: SURGERY | Facility: CLINIC | Age: 45
End: 2025-06-16
Payer: COMMERCIAL

## 2025-06-16 ENCOUNTER — TELEPHONE (OUTPATIENT)
Dept: HEMATOLOGY/ONCOLOGY | Facility: CLINIC | Age: 45
End: 2025-06-16
Payer: COMMERCIAL

## 2025-06-16 ENCOUNTER — PATIENT MESSAGE (OUTPATIENT)
Dept: HEMATOLOGY/ONCOLOGY | Facility: CLINIC | Age: 45
End: 2025-06-16
Payer: COMMERCIAL

## 2025-06-16 DIAGNOSIS — C50.912 INVASIVE DUCTAL CARCINOMA OF BREAST, FEMALE, LEFT: ICD-10-CM

## 2025-06-16 DIAGNOSIS — N95.1 MENOPAUSAL SYMPTOMS: ICD-10-CM

## 2025-06-16 DIAGNOSIS — M54.59 OTHER LOW BACK PAIN: ICD-10-CM

## 2025-06-16 DIAGNOSIS — M79.10 MYALGIA: Primary | ICD-10-CM

## 2025-06-16 NOTE — NURSING
RN Hemant lvm with return contact information for referral to [facility], MANOHAR Hewitt.

## 2025-06-16 NOTE — NURSING
Acupuncture orders placed per protocol. Awaiting reply for scheduling provider visit, MANOHAR Hewitt.

## 2025-06-17 ENCOUNTER — LAB VISIT (OUTPATIENT)
Dept: LAB | Facility: HOSPITAL | Age: 45
End: 2025-06-17
Payer: COMMERCIAL

## 2025-06-17 ENCOUNTER — OFFICE VISIT (OUTPATIENT)
Dept: GASTROENTEROLOGY | Facility: CLINIC | Age: 45
End: 2025-06-17
Payer: COMMERCIAL

## 2025-06-17 VITALS
HEART RATE: 82 BPM | DIASTOLIC BLOOD PRESSURE: 88 MMHG | HEIGHT: 64 IN | SYSTOLIC BLOOD PRESSURE: 122 MMHG | BODY MASS INDEX: 24.01 KG/M2 | WEIGHT: 140.63 LBS

## 2025-06-17 DIAGNOSIS — R10.12 LUQ PAIN: ICD-10-CM

## 2025-06-17 DIAGNOSIS — R10.12 LUQ PAIN: Primary | ICD-10-CM

## 2025-06-17 DIAGNOSIS — Z79.1 NSAID LONG-TERM USE: ICD-10-CM

## 2025-06-17 PROCEDURE — 99999 PR PBB SHADOW E&M-EST. PATIENT-LVL III: CPT | Mod: PBBFAC,,,

## 2025-06-17 PROCEDURE — 86677 HELICOBACTER PYLORI ANTIBODY: CPT

## 2025-06-17 PROCEDURE — 99203 OFFICE O/P NEW LOW 30 MIN: CPT | Mod: S$GLB,,,

## 2025-06-17 PROCEDURE — 36415 COLL VENOUS BLD VENIPUNCTURE: CPT

## 2025-06-17 PROCEDURE — 3074F SYST BP LT 130 MM HG: CPT | Mod: CPTII,S$GLB,,

## 2025-06-17 PROCEDURE — 3044F HG A1C LEVEL LT 7.0%: CPT | Mod: CPTII,S$GLB,,

## 2025-06-17 PROCEDURE — 3008F BODY MASS INDEX DOCD: CPT | Mod: CPTII,S$GLB,,

## 2025-06-17 PROCEDURE — 1159F MED LIST DOCD IN RCRD: CPT | Mod: CPTII,S$GLB,,

## 2025-06-17 PROCEDURE — 3079F DIAST BP 80-89 MM HG: CPT | Mod: CPTII,S$GLB,,

## 2025-06-17 RX ORDER — OMEPRAZOLE 40 MG/1
40 CAPSULE, DELAYED RELEASE ORAL DAILY
Qty: 90 CAPSULE | Refills: 3 | Status: SHIPPED | OUTPATIENT
Start: 2025-06-17 | End: 2026-06-17

## 2025-06-17 NOTE — PROGRESS NOTES
Gastroenterology Clinic Consultation Note    Patient ID: Lauren Schmid is a 44 y.o. female.        Chief Complaint: Abdominal Pain    CHIEF COMPLAINT:  Patient presents today for persistent sharp pain in upper left abdomen    HISTORY OF PRESENT ILLNESS:  She reports sharp pain in the upper left abdomen at the costal margin for 6-7 weeks. Pain occurs daily, primarily in the morning upon waking and before bedtime. Pain is exacerbated by movement, particularly when rolling onto the left side or bending over, and worsens with fullness. She describes concern about feeling like something might rupture during certain movements. She has a history of migraines and headaches since age 16, managed with frequent but not daily NSAIDs. She also has a history of anxiety with associated decreased appetite. Previous anxiety-related bowel issues have largely resolved. She denies current constipation, diarrhea, or bloody stools. Recent abdominal sonogram showed no abnormalities in any organs. Denies any family history of GI cancers, dysphagia, odynophagia, reflux symptoms, dark tarry stools are bright red blood in stool.    RECENT SURGERY:  She underwent a lumpectomy on Thursday for recently diagnosed breast cancer and is currently in recovery. She is following post-surgical pain management regimen of alternating naproxen and Tylenol every 6 hours for 5 days.    ROS:  Review of Systems   Constitutional:  Negative for chills and fever.   Respiratory:  Negative for cough and shortness of breath.    Cardiovascular:  Negative for chest pain.   Gastrointestinal:  Positive for abdominal pain (LUQ). Negative for blood in stool, constipation, diarrhea, heartburn, melena, nausea and vomiting.   Skin:  Negative for rash.   Neurological:  Negative for seizures, loss of consciousness and weakness.        Medical History:  has a past medical history of Breast cancer, Herpes simplex virus (HSV) infection, and Plantar fasciitis  "of right foot (10/2022).    Surgical History:  has a past surgical history that includes Mouth surgery (N/A, 04/07/2025); Mastectomy, partial (Left, 6/12/2025); [place] lymph node biopsy (Left, 6/12/2025); and Injection for sentinel node identification (Left, 6/12/2025).    Family History: family history is not on file..       Review of patient's allergies indicates:   Allergen Reactions    Codeine Other (See Comments)     vomitting       Medications Ordered Prior to Encounter[1]    Labs:  Lab Results   Component Value Date    WBC 6.68 05/27/2025    HGB 14.1 05/27/2025    HCT 44.4 05/27/2025     05/27/2025    CHOL 127 03/25/2025    TRIG 60 03/25/2025    HDL 64 03/25/2025    ALKPHOS 52 05/27/2025    ALT 12 05/27/2025    AST 17 05/27/2025     05/27/2025    K 4.0 05/27/2025     05/27/2025    CREATININE 0.7 05/27/2025    BUN 12 05/27/2025    CO2 23 05/27/2025    TSH 3.124 03/25/2025    HGBA1C 4.9 03/25/2025       Vital Signs:  /88 (BP Location: Right arm, Patient Position: Sitting)   Pulse 82   Ht 5' 4" (1.626 m)   Wt 63.8 kg (140 lb 10.5 oz)   LMP 06/04/2025 (Exact Date)   BMI 24.14 kg/m²   Body mass index is 24.14 kg/m².    Physical Exam:  Physical Exam  Vitals and nursing note reviewed.   Constitutional:       General: She is not in acute distress.     Appearance: Normal appearance. She is not ill-appearing.   HENT:      Head: Normocephalic and atraumatic.   Eyes:      Extraocular Movements: Extraocular movements intact.   Cardiovascular:      Rate and Rhythm: Normal rate and regular rhythm.   Pulmonary:      Effort: Pulmonary effort is normal. No respiratory distress.   Abdominal:      General: Bowel sounds are normal. There is no distension.      Palpations: Abdomen is soft.      Tenderness: There is no abdominal tenderness.   Neurological:      General: No focal deficit present.      Mental Status: She is alert and oriented to person, place, and time.   Psychiatric:         Mood and " Affect: Mood normal.         Behavior: Behavior normal.       Imaging reviewed: US Abdomen 06/10/2025  FINDINGS:  Pancreas: The visualized portions of pancreas appear unremarkable.     Aorta: No aneurysm.     Liver: 16.9 cm, normal in size. Homogeneous parenchymal echotexture. No focal lesions.     Gallbladder: No calculi, wall thickening, or pericholecystic fluid.  Negative sonographic Burch's sign.     Biliary system: 5 mm common bile duct.  No intrahepatic ductal dilatation.     Inferior vena cava: Unremarkable.     Right kidney: 11.1 cm. No hydronephrosis.     Left kidney: 10.8 cm. No hydronephrosis.     Spleen: 9.6 cm.  Normal in size with homogeneous echotexture.     Miscellaneous: No ascites.     Impression:     No sonographic abnormality.        Electronically signed by:Justen Perkins MD  Date:                                            06/10/2025  Time:                                           15:57    Endoscopy reviewed: No previous endoscopies    Assessment & Plan  1. LUQ pain    2. NSAID long-term use      Orders Placed This Encounter    H. PYLORI ANTIBODY, IGG    omeprazole (PRILOSEC) 40 MG capsule       LUQ PAIN:  - Suspect gastritis or ulcer due to long-term NSAID use for headaches.  - Started proton pump inhibitor therapy (omeprazole) to address potential gastric irritation, to be taken 30-45 minutes before the first meal of the day.  - If symptoms persist after 4 weeks of treatment, consider upper endoscopy for further evaluation or imaging studies.  - May need both upper endoscopy and colonoscopy if symptoms continue, given age.  - Explained potential gastric effects of long-term NSAID use, including increased risk of ulcers.  - Provided overview of upper endoscopy procedure, including its diagnostic and therapeutic capabilities.  - Consider H. pylori as potential cause of abdominal pain and reflux symptoms.  - Ordered H. pylori blood test.    NSAID LONG TERM USE:  - Patient to reduce NSAID use  and switch to Tylenol (acetaminophen) for pain relief when possible.   Started proton pump inhibitor therapy (omeprazole or pantoprazole) to address potential gastric irritation, to be taken 30-45 minutes before the first meal of the day.    FOLLOW UP:  - In 8 weeks  - Can send message via the portal if no relief in about 4 weeks.       CINTHIA GALEANO-C  Gastroenterology Department  [facility]  [phone]     This note was generated with the assistance of ambient listening technology. Verbal consent was obtained by the patient and accompanying visitor(s) for the recording of patient appointment to facilitate this note. I attest to having reviewed and edited the generated note for accuracy, though some syntax or spelling errors may persist. Please contact the author of this note for any clarification.          [1]   Current Outpatient Medications on File Prior to Visit   Medication Sig Dispense Refill    acetaminophen (TYLENOL) 500 MG tablet Take 2 tablets (1,000 mg total) by mouth 2 (two) times a day. for 5 days 20 tablet 0    hydrOXYzine pamoate (VISTARIL) 25 MG Cap TAKE 1 CAPSULE (25 MG TOTAL) BY MOUTH DAILY AS NEEDED. 90 capsule 1    naproxen (NAPROSYN) 500 MG tablet Take 1 tablet (500 mg total) by mouth 2 (two) times daily with meals. for 5 days 10 tablet 0    ondansetron (ZOFRAN-ODT) 8 MG TbDL dissolve 1 tablet (8 mg total) by mouth every 6 (six) hours as needed. 10 tablet 0    oxyCODONE (ROXICODONE) 5 MG immediate release tablet Take 1 tablet (5 mg total) by mouth every 6 (six) hours as needed for Pain. 10 tablet 0    rizatriptan (MAXALT) 10 MG tablet Take one tablet (10 mg) at the onset of headaches; if symptoms persist or return, may repeat dose after 2 hours. maximum dose: 20 mg per 24 hours 8 tablet 5     No current facility-administered medications on file prior to visit.

## 2025-06-18 ENCOUNTER — RESULTS FOLLOW-UP (OUTPATIENT)
Dept: GASTROENTEROLOGY | Facility: CLINIC | Age: 45
End: 2025-06-18
Payer: COMMERCIAL

## 2025-06-18 LAB — H PYLORI IGG SERPL QL IA: NEGATIVE

## 2025-06-19 ENCOUNTER — OFFICE VISIT (OUTPATIENT)
Dept: OBSTETRICS AND GYNECOLOGY | Facility: CLINIC | Age: 45
End: 2025-06-19
Payer: COMMERCIAL

## 2025-06-19 VITALS
SYSTOLIC BLOOD PRESSURE: 121 MMHG | WEIGHT: 139.31 LBS | BODY MASS INDEX: 23.78 KG/M2 | DIASTOLIC BLOOD PRESSURE: 90 MMHG | HEIGHT: 64 IN

## 2025-06-19 DIAGNOSIS — Z12.4 SCREENING FOR CERVICAL CANCER: ICD-10-CM

## 2025-06-19 DIAGNOSIS — Z01.419 WELL WOMAN EXAM WITH ROUTINE GYNECOLOGICAL EXAM: Primary | ICD-10-CM

## 2025-06-19 DIAGNOSIS — C50.912 INVASIVE DUCTAL CARCINOMA OF BREAST, FEMALE, LEFT: ICD-10-CM

## 2025-06-19 DIAGNOSIS — Z30.09 EVALUATION REGARDING CONTRACEPTION OPTIONS: ICD-10-CM

## 2025-06-19 PROCEDURE — 99999 PR PBB SHADOW E&M-EST. PATIENT-LVL III: CPT | Mod: PBBFAC,,, | Performed by: OBSTETRICS & GYNECOLOGY

## 2025-06-19 RX ORDER — LACTIC ACID, L-, CITRIC ACID MONOHYDRATE, AND POTASSIUM BITARTRATE 90; 50; 20 MG/5G; MG/5G; MG/5G
1 GEL VAGINAL ONCE
Qty: 60 G | Refills: 5 | Status: SHIPPED | OUTPATIENT
Start: 2025-06-19 | End: 2025-06-19

## 2025-06-19 NOTE — PROGRESS NOTES
"Subjective     Patient ID: Laurne Schmid is a 44 y.o. female.    Chief Complaint:  Well Woman      History of Present Illness  HPI  44 y.o.  presents for annual exam.  Overall doing well today, but recently underwent lumpectomy with sentinel lymph node dx due to left breast cancer dx in April/May.  Overall handling diagnosis well.  Was previously on cOCPs, but stopped .  Cycles used to be very heavy and painful, but has been on OCPs since her teens to help control them.  She did have a cycle on  that was light and only lasted 3-4 days.  She is sexually active without complaints.  Denies discharge/itch/irritation.  No other complaints today.  Of note, she has had GI issues recently, seen by GI, likely stress/anxiety related, started on protonix.      GYN & OB History  Patient's last menstrual period was 2025 (exact date).   Date of Last Pap: 10/19/2022    OB History    Para Term  AB Living   0 0 0 0 0 0   SAB IAB Ectopic Multiple Live Births   0 0 0 0 0       Review of Systems  Review of Systems   Constitutional:  Negative for chills and fever.   Respiratory:  Negative for shortness of breath.    Cardiovascular:  Negative for chest pain.   Gastrointestinal:  Negative for abdominal pain.   Genitourinary:  Negative for dyspareunia, pelvic pain and vaginal discharge.   Musculoskeletal:  Negative for myalgias.   Neurological:  Negative for headaches.          Objective   Physical Exam    BP (!) 121/90 (Patient Position: Sitting)   Ht 5' 4" (1.626 m)   Wt 63.2 kg (139 lb 5.3 oz)   LMP 2025 (Exact Date)   BMI 23.92 kg/m²     Gen: NAD  Resp: Normal respiratory effort  Breast: deferred - healing post-surgery  Abd: soft, NT  Pelvic: Normal-appearing external female genitalia.  No CMT.  Uterus small, mobile, nontender.  No adnexal masses or tenderness.    Ext: normal ROM  Psych: appropriate affect  Neuro: grossly intact         Assessment and Plan     Lauren was seen today for " well woman.    Diagnoses and all orders for this visit:    Well woman exam with routine gynecological exam    Screening for cervical cancer  -     Liquid-Based Pap Smear, Screening    Evaluation regarding contraception options  -     lactic acid-citric-potassium (PHEXXI) 1.8-1-0.4 % Gel; Place 1 Applicatorful vaginally once. for 1 dose    Invasive ductal carcinoma of breast, female, left          Plan:  Routine annual exam with pap smear and breast exam today.  Declined STD screening.  Contraception counseling done.  Last cycle was lighter, prefers to monitor cycles for now off of hormonal birth control.  Discussed Phexxi and Copper IUD - handouts given. eRx phexxi.  Also discussed possible progesterone-only options if her cycles become too heavy or painful.   Counseling done, precautions given, all questions answered.  RTC 1 year for annual, or prn.

## 2025-06-23 LAB
DHEA SERPL-MCNC: NORMAL
ESTROGEN SERPL-MCNC: NORMAL PG/ML
INSULIN SERPL-ACNC: NORMAL U[IU]/ML
LAB AP CLINICAL INFORMATION: NORMAL
LAB AP GROSS DESCRIPTION: NORMAL
LAB AP PERFORMING LOCATION(S): NORMAL
LAB AP REPORT FOOTNOTES: NORMAL
LAB AP SYNOPTIC CHECKLIST: NORMAL

## 2025-06-24 ENCOUNTER — TELEPHONE (OUTPATIENT)
Dept: HEMATOLOGY/ONCOLOGY | Facility: CLINIC | Age: 45
End: 2025-06-24
Payer: COMMERCIAL

## 2025-06-24 NOTE — TELEPHONE ENCOUNTER
Called and spoke to patient to confirm the virtual visit on 7/1/25 and to complete the questionnaire.

## 2025-06-25 ENCOUNTER — DOCUMENTATION ONLY (OUTPATIENT)
Dept: SURGERY | Facility: CLINIC | Age: 45
End: 2025-06-25
Payer: COMMERCIAL

## 2025-06-25 NOTE — NURSING
Oncotype ordered, order # [id number] . LVM for patient to return call to schedule appt with medical and radiation oncology.

## 2025-06-25 NOTE — PROGRESS NOTES
Post-Op  [facility]  Department of Surgery    REFERRING PROVIDER: No referring provider defined for this encounter. Nicolas Escalante MD  MEDICAL ONCOLOGIST:    MELONIE Jacob  RADIATION ONCOLOGIST:   VALENTINO    DIAGNOSIS:    This is a 44 y.o. female with a stage pT1c N0(sn) grade 2 ER + ID + (20%) HER2 - IDC of the left breast.     Initial presentation: 1.7 cm IDC, clinically negative nodes    TREATMENT SUMMARY:  The patient is status post left lumpectomy and left sentinel node biopsy on 6/12/2025.  Final pathology showed 1.6 cm IDC and 2.0 cm DCIS. Invasive margins clear, closest 4 mm posterior. DCIS margin clear, all greater than 5 mm. 0/3 LN.      INTERVAL HISTORY:   Lauren Schmid comes in for a post-op check.  She denies fever, chills, chest pain or shortness of breath.  Her pain is well controlled.  Persistent episodic dizziness after procedure. Noticed with standing, sudden movements and when drinking alcohol. Was instructed to drink more water which has helped a bit.     MEDICATIONS:  Current Medications[1]    ALLERGIES:   Review of patient's allergies indicates:   Allergen Reactions    Codeine Other (See Comments)     vomitting       PHYSICAL EXAMINATION:   General:  This is a well appearing female with appropriate speech, affect and gait.     Breast:  Incision clean, dry, and intact. Nipple incision and axillary incision    IMPRESSION:   The patient has had an uneventful postoperative course.    PLAN:   1. return in April for a follow up office visit and breast exam  2. bilateral mammogram in in April 2026  3. The patient is advised in continued exam of the breast chest wall and to report to this office sooner should she note any areas of abnormality or concern.   4.  She has been instructed to meet with med onc and rad onc for discussion of adjuvant treatment recommendations.  Oncotype           [1]   Current Outpatient Medications   Medication Sig Dispense Refill    hydrOXYzine pamoate (VISTARIL) 25 MG  Cap TAKE 1 CAPSULE (25 MG TOTAL) BY MOUTH DAILY AS NEEDED. 90 capsule 1    omeprazole (PRILOSEC) 40 MG capsule Take 1 capsule (40 mg total) by mouth once daily. 90 capsule 3    rizatriptan (MAXALT) 10 MG tablet Take one tablet (10 mg) at the onset of headaches; if symptoms persist or return, may repeat dose after 2 hours. maximum dose: 20 mg per 24 hours 8 tablet 5    ondansetron (ZOFRAN-ODT) 8 MG TbDL dissolve 1 tablet (8 mg total) by mouth every 6 (six) hours as needed. (Patient not taking: Reported on 6/26/2025) 10 tablet 0     No current facility-administered medications for this visit.

## 2025-06-26 ENCOUNTER — OFFICE VISIT (OUTPATIENT)
Dept: SURGERY | Facility: CLINIC | Age: 45
End: 2025-06-26
Payer: COMMERCIAL

## 2025-06-26 VITALS
HEIGHT: 64 IN | OXYGEN SATURATION: 99 % | BODY MASS INDEX: 24.1 KG/M2 | HEART RATE: 93 BPM | SYSTOLIC BLOOD PRESSURE: 134 MMHG | DIASTOLIC BLOOD PRESSURE: 95 MMHG | WEIGHT: 141.13 LBS

## 2025-06-26 DIAGNOSIS — Z12.31 SCREENING MAMMOGRAM FOR BREAST CANCER: ICD-10-CM

## 2025-06-26 DIAGNOSIS — C50.812 MALIGNANT NEOPLASM OF OVERLAPPING SITES OF LEFT BREAST IN FEMALE, ESTROGEN RECEPTOR POSITIVE: Primary | ICD-10-CM

## 2025-06-26 DIAGNOSIS — Z17.0 MALIGNANT NEOPLASM OF OVERLAPPING SITES OF LEFT BREAST IN FEMALE, ESTROGEN RECEPTOR POSITIVE: Primary | ICD-10-CM

## 2025-06-26 PROCEDURE — 99999 PR PBB SHADOW E&M-EST. PATIENT-LVL III: CPT | Mod: PBBFAC,,, | Performed by: SURGERY

## 2025-06-26 PROCEDURE — 3075F SYST BP GE 130 - 139MM HG: CPT | Mod: CPTII,S$GLB,, | Performed by: SURGERY

## 2025-06-26 PROCEDURE — 3044F HG A1C LEVEL LT 7.0%: CPT | Mod: CPTII,S$GLB,, | Performed by: SURGERY

## 2025-06-26 PROCEDURE — 1159F MED LIST DOCD IN RCRD: CPT | Mod: CPTII,S$GLB,, | Performed by: SURGERY

## 2025-06-26 PROCEDURE — 3080F DIAST BP >= 90 MM HG: CPT | Mod: CPTII,S$GLB,, | Performed by: SURGERY

## 2025-06-26 PROCEDURE — 99024 POSTOP FOLLOW-UP VISIT: CPT | Mod: S$GLB,,, | Performed by: SURGERY

## 2025-06-27 ENCOUNTER — TELEPHONE (OUTPATIENT)
Dept: HEMATOLOGY/ONCOLOGY | Facility: CLINIC | Age: 45
End: 2025-06-27
Payer: COMMERCIAL

## 2025-06-27 ENCOUNTER — DOCUMENTATION ONLY (OUTPATIENT)
Dept: SURGERY | Facility: CLINIC | Age: 45
End: 2025-06-27
Payer: COMMERCIAL

## 2025-06-27 ENCOUNTER — PATIENT MESSAGE (OUTPATIENT)
Dept: SURGERY | Facility: CLINIC | Age: 45
End: 2025-06-27
Payer: COMMERCIAL

## 2025-06-27 NOTE — TELEPHONE ENCOUNTER
LMOVM pt to remind of virtual appt on 6/30/25 with Griselda Alarcon NP. Contact information was given should pt need to contact us back.

## 2025-06-29 ENCOUNTER — PATIENT MESSAGE (OUTPATIENT)
Dept: ORTHOPEDICS | Facility: CLINIC | Age: 45
End: 2025-06-29
Payer: COMMERCIAL

## 2025-06-30 ENCOUNTER — OFFICE VISIT (OUTPATIENT)
Dept: HEMATOLOGY/ONCOLOGY | Facility: CLINIC | Age: 45
End: 2025-06-30
Payer: COMMERCIAL

## 2025-06-30 ENCOUNTER — TELEPHONE (OUTPATIENT)
Dept: HEMATOLOGY/ONCOLOGY | Facility: CLINIC | Age: 45
End: 2025-06-30
Payer: COMMERCIAL

## 2025-06-30 ENCOUNTER — CLINICAL SUPPORT (OUTPATIENT)
Dept: REHABILITATION | Facility: HOSPITAL | Age: 45
End: 2025-06-30
Payer: COMMERCIAL

## 2025-06-30 DIAGNOSIS — F43.22 ADJUSTMENT REACTION WITH ANXIOUS MOOD: ICD-10-CM

## 2025-06-30 DIAGNOSIS — Z74.09 IMPAIRED FUNCTIONAL MOBILITY AND ACTIVITY TOLERANCE: ICD-10-CM

## 2025-06-30 DIAGNOSIS — M25.612 DECREASED ROM OF LEFT SHOULDER: Primary | ICD-10-CM

## 2025-06-30 DIAGNOSIS — M79.642 LEFT HAND PAIN: Primary | ICD-10-CM

## 2025-06-30 DIAGNOSIS — G47.00 INSOMNIA, UNSPECIFIED TYPE: Primary | ICD-10-CM

## 2025-06-30 DIAGNOSIS — Z91.89 AT RISK FOR LYMPHEDEMA: ICD-10-CM

## 2025-06-30 DIAGNOSIS — C50.812 MALIGNANT NEOPLASM OF OVERLAPPING SITES OF LEFT BREAST IN FEMALE, ESTROGEN RECEPTOR POSITIVE: ICD-10-CM

## 2025-06-30 DIAGNOSIS — Z17.0 MALIGNANT NEOPLASM OF OVERLAPPING SITES OF LEFT BREAST IN FEMALE, ESTROGEN RECEPTOR POSITIVE: ICD-10-CM

## 2025-06-30 PROCEDURE — 97161 PT EVAL LOW COMPLEX 20 MIN: CPT

## 2025-06-30 PROCEDURE — 3044F HG A1C LEVEL LT 7.0%: CPT | Mod: CPTII,95,, | Performed by: NURSE PRACTITIONER

## 2025-06-30 PROCEDURE — 1160F RVW MEDS BY RX/DR IN RCRD: CPT | Mod: CPTII,95,, | Performed by: NURSE PRACTITIONER

## 2025-06-30 PROCEDURE — 98007 SYNCH AUDIO-VIDEO EST HI 40: CPT | Mod: 95,,, | Performed by: NURSE PRACTITIONER

## 2025-06-30 PROCEDURE — 97535 SELF CARE MNGMENT TRAINING: CPT

## 2025-06-30 PROCEDURE — 1159F MED LIST DOCD IN RCRD: CPT | Mod: CPTII,95,, | Performed by: NURSE PRACTITIONER

## 2025-06-30 NOTE — NURSING
RN Hemant lvm w/return contact information for virtual visit today at 1pm w/Griselda Alarcon NP. MANOHAR Hewitt.

## 2025-06-30 NOTE — PATIENT INSTRUCTIONS
PRE/POST OP PATIENT EDUCATION    Post Operative Instructions             When to call your doctor   - if any part of your affected arm or axilla feels hot, is reddened or has increased swelling   - if you develop a temperature over 101 degrees Fahrenheit      Lymphedema - Identification and Prevention     Lymphedema - is the swelling of a body area or extremity caused by the accumulation of lymphatic fluid.  There is a risk for lymphedema with the removal of lymph nodes, trauma or radiation therapy.  Treatment of breast cancer often involves surgery: mastectomy or lumpectomy. Some of the lymph nodes in the underarm (called axillary lymph nodes) may be removed and checked to see if they contain cancer cells.     During breast surgery when axillary lymph nodes are removed (with sentinel node biopsy or axillary dissection) or are treated with radiation therapy, the lymphatic system may become impaired. This may prevent lymphatic fluid from leaving the area therefore, causing lymphedema.     Lymphatic fluid is a normal part of the circulatory system. Its function is to remove waste products and to produce cells vital to fighting infection. Swelling occurs when the vessels become restricted and the lymphatic fluid is unable to freely flow through them.  If lymphedema is left untreated, the affected limb could progressively become more swollen, which could lead to hardening of the skin, bulkiness in the limb, infection and impaired wound healing.         There are things you can do to decrease the chance of developing lymphedema.                                          [email or web address]                                                                                        The information presented is intended for general information and educational purposes. It is not intended to replace the  advice of your health care provider. Contact your health care provider if  you believe you have a health problem.                POST OP EXERCISES - SAFE TO DO THE FIRST 2 WEEKS AFTER SURGERY UNTIL YOUR FOLLOW UP APPOINTMENT WITH PHYSICAL/OCCUPATIONAL THERAPY    Scapular Retraction (Standing)    With arms at sides, squeeze shoulder blades together. Do not shrug shoulders and do not hold your breath. Hold 5 seconds. Repeat 10 times, 2-3 sets, 2 x day.  Can be completed seated or standing.      Exaggerated Breathing and Relaxation      Practice deep breathing frequently in the first few days following surgery even before you begin exercising. This exercise helps with tissue extensibility in the chest wall.  Inhale slowly and deeply through the nose and exhale through pursed lips. Concentrate on relaxing as you let the air out of your lungs. Repeat three (3) to four (4) times, remembering to breath in deeply and then relaxing. This exercise helps to ease the sensation of pulling and discomfort that may be experienced while exercising.

## 2025-06-30 NOTE — PROGRESS NOTES
The patient location is: [place], [place]  The chief complaint leading to consultation is: wellness    Visit type: audiovisual    Each patient to whom he or she provides medical services by telemedicine is:  (1) informed of the relationship between the physician and patient and the respective role of any other health care provider with respect to management of the patient; and (2) notified that he or she may decline to receive medical services by telemedicine and may withdraw from such care at any time.    Notes:     Lauren Schmid  44 y.o. is here to seek an integrative approach to discuss side effects related to breast cancer treatment. Lauren Schmid  was referred by Dr. Patterson     HPI  Mrs. Schmid reports she was diagnosed with breast cancer after a screening mammogram. She had a lumpectomy 6/2025 which she reports went well. She is healing well. She feels she is managing her emotions well and has some ups and down. She is sleeping well with sleep gummies with melatonin/CBD/THC which works well. She has a good appetite and she eats healthy and enjoys cooking. She is generally active and does yoga twice weekly and walks 3 times a week for 45 minutes. She also bought a bike before her surgery so she will add biking.   She works full time at a restaurant as a manager. She has been with her boyfriend for 7 years.     Pillars Assessment    Sleep  How many hours of sleep per night? 7-9 hours  Do you have trouble falling asleep, staying asleep or waking up earlier than you need to? yes  Do you have daytime fatigue? no  Do you need medication for sleep? no  Do you use any supplements or other interventions for sleep? yes    Resilience  Rate your current level of stress- low to moderate    Environment  Any exposures:lives with a smoker, he does smoke outside only.     Spirituality- no formal Latter day practice    Nutrition   Food allergies or sensitivities: no  Do you adhere to a particular type of diet? no  Do you have  any concerns with your eating habits? no    Exercise  How would you describe your physical activity level? moderate  Do you work at a sedentary job? no    Past Medical History  Past Medical History:   Diagnosis Date    Breast cancer     Herpes simplex virus (HSV) infection     Plantar fasciitis of right foot 10/2022      Past Surgical History   Past Surgical History:   Procedure Laterality Date    INJECTION FOR SENTINEL NODE IDENTIFICATION Left 6/12/2025    Procedure: INJECTION, FOR SENTINEL NODE IDENTIFICATION;  Surgeon: Tamar Patterson MD;  Location: [facility];  Service: General;  Laterality: Left;    MASTECTOMY, PARTIAL Left 6/12/2025    Procedure: MASTECTOMY, PARTIAL-left with radiological marker;  Surgeon: Tamar Patterson MD;  Location: [facility];  Service: General;  Laterality: Left;    MOUTH SURGERY N/A 04/07/2025    SENTINEL LYMPH NODE BIOPSY Left 6/12/2025    Procedure: BIOPSY, SENTINEL LYMPH NODE;  Surgeon: Tamar Patterson MD;  Location: [facility];  Service: General;  Laterality: Left;      Family History   Family History   Problem Relation Name Age of Onset    Breast cancer Neg Hx      Colon cancer Neg Hx      Ovarian cancer Neg Hx      Esophageal cancer Neg Hx        Allergies  Review of patient's allergies indicates:   Allergen Reactions    Codeine Other (See Comments)     vomitting      Current Medications:  Current Medications[1]     Review of Systems  Review of Systems   Constitutional: Negative.    HENT: Negative.     Eyes: Negative.    Respiratory: Negative.     Cardiovascular: Negative.    Gastrointestinal: Negative.    Genitourinary: Negative.    Musculoskeletal: Negative.    Skin: Negative.    Neurological: Negative.    Endo/Heme/Allergies: Negative.    Psychiatric/Behavioral:  The patient is nervous/anxious and has insomnia.       Physical Exam      There were no vitals filed for this visit.  There is no height or weight on file to calculate BMI.  Physical Exam  Constitutional:       Appearance:  Normal appearance.   Neurological:      Mental Status: She is alert.   Psychiatric:         Mood and Affect: Mood normal.         Behavior: Behavior normal.        ASSESSMENT :  1. Insomnia, unspecified type    2. Adjustment reaction with anxious mood    3. Malignant neoplasm of overlapping sites of left breast in female, estrogen receptor positive       PLAN:  Reviewed all information discussed at today's visit and all questions were answered.    Counseled on healthy lifestyle and behavior modifications: Once released from surgeon, recommended to  Exercise at least 150 minutes per week of moderate intensity aerobic activity.  Maintaining a healthy weight, Limit red meat to no more than 2-3x per week, Reduce processed foods and meat. Also encouraged wide variety of fruits and vegetables  Continue PT  Counseled on sleep hygiene: Recommended insight timer-breathing into sleep, progressive muscle relaxation, and guided meditations.   I discussed and recommended the following support services:  Paul Chi and Yoga I suggested Paul Chi and/or Yoga as these practices reduce stress, increases flexibility and muscle strength, improves balance and promotes serenity in the power of movement to help fight disease and boost your immune system.   Music and relaxation therapy and Meditation which can decrease stress by lowering blood pressure, slowing breathing, and helping you be more present in the moment. It improves sleep by relaxing the body and mind at the end of the day.Meditation also trains you how to focus on one thing at a time, improving concentration. It also promotes emotional well-being by decreasing depression and anxiety, and helping create a more positive outlook on life.    Follow up with Integrative Services as needed    I spent a total of 41 minutes on the day of the visit.This includes face to face time and non-face to face time preparing to see the patient (eg, review of tests), obtaining and/or reviewing  separately obtained history, documenting clinical information in the electronic or other health record, independently interpreting results and communicating results to the patient/family/caregiver, or care coordinator.         [1]   Current Outpatient Medications:     hydrOXYzine pamoate (VISTARIL) 25 MG Cap, TAKE 1 CAPSULE (25 MG TOTAL) BY MOUTH DAILY AS NEEDED., Disp: 90 capsule, Rfl: 1    omeprazole (PRILOSEC) 40 MG capsule, Take 1 capsule (40 mg total) by mouth once daily., Disp: 90 capsule, Rfl: 3    ondansetron (ZOFRAN-ODT) 8 MG TbDL, dissolve 1 tablet (8 mg total) by mouth every 6 (six) hours as needed. (Patient not taking: Reported on 6/26/2025), Disp: 10 tablet, Rfl: 0    rizatriptan (MAXALT) 10 MG tablet, Take one tablet (10 mg) at the onset of headaches; if symptoms persist or return, may repeat dose after 2 hours. maximum dose: 20 mg per 24 hours, Disp: 8 tablet, Rfl: 5

## 2025-06-30 NOTE — PROGRESS NOTES
Outpatient Rehab    Physical Therapy Evaluation    Patient Name: Lauren Schmid  MRN: [id number]  YOB: 1980  Encounter Date: 6/30/2025    Therapy Diagnosis:   Encounter Diagnoses   Name Primary?    Malignant neoplasm of overlapping sites of left breast in female, estrogen receptor positive     Decreased ROM of left shoulder Yes    Impaired functional mobility and activity tolerance     At risk for lymphedema      Physician: Miriam Tan PA-C    Physician Orders: Eval and Treat  Medical Diagnosis: Malignant neoplasm of overlapping sites of left breast in female, estrogen receptor positive  Surgical Diagnosis: Not applicable for this Episode   Surgical Date: Not applicable for this Episode  Days Since Last Surgery: Not applicable for this Episode    Visit # / Visits Authorized:  1 / 1  Insurance Authorization Period: 5/27/2025 to 12/31/2025  Date of Evaluation: 6/30/2025  Plan of Care Certification: 6/30/2025 to 8/25/25     Time In: 1040   Time Out: 1125  Total Time (in minutes): 45   Total Billable Time (in minutes): 45    Intake Outcome Measure for FOTO Survey    Therapist reviewed FOTO scores for Lauren Schmid on 6/30/2025.   FOTO report - see Media section or FOTO account episode details.     Intake Score: 52%    Precautions:     Standard, Cancer. Limiting lifting > 10#      Subjective   History of Present Illness  Lauren is a 44 y.o. female who reports to physical therapy with a chief concern of decreased shoulder ROM.                 Dominant Hand: Right  History of Present Condition/Illness: Pt with left breast cancer, s/p L lumpectomy and SLNB on 6/12/25.  She will have Radiation at end of July.  She is waiting on oncotype to determine need for chemo or endocrine therapy.  Pt states she is here because she was told she needed to. She does endorse some limited left shoulder ROM since her surgery.  She needs increased time or to modify certain activities.  Her boyfriend is assisting with  heavier household chores.    Additional Lymphedema History     Treatment history potentially impacting lymphedema includes [place] lymph node biopsy. Does endorses she did have some left shoulder pain with sleeping on her left side or overhead activities prior to surgery.    Activities of Daily Living  Social history was obtained from Patient.    General Prior Level of Function Comments: completely independent  General Current Level of Function Comments: modified independent  Patient Responsibilities: Community mobility, Financial management, Driving, Health management, Home management, Laundry, Meal prep, Shopping, Personal ADL    Previously independent with activities of daily living? Yes     Currently independent with activities of daily living? Yes          Previously independent with instrumental activities of daily living? Yes     Currently independent with instrumental activities of daily living? Yes     Her boyfriend assists with heavier lifting tasks currently.        Pain     Patient reports a current pain level of 1/10. Pain at best is reported as 0/10. Pain at worst is reported as 4/10.   Location: left breast and armpit.  Pain-Relieving Factors: Ice, Compression         Treatment History  Treatments  Previously Received Treatments: Yes  Previous Treatments: Physical therapy    Living Arrangements  Living Situation  Housing: Home independently  Living Arrangements: Spouse/significant other    Home Setup  Type of Structure: House        Employment  Patient reports: Does the patient's condition impact their ability to work?  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      Past Medical History/Physical Systems Review:   Lauren Schmid  has a past medical history of Breast cancer, Herpes simplex virus (HSV) infection, and Plantar fasciitis of right foot.    Lauren Schmid  has a past surgical history that includes Mouth surgery (N/A, 04/07/2025);  Mastectomy, partial (Left, 6/12/2025); [place] lymph node biopsy (Left, 6/12/2025); and Injection for sentinel node identification (Left, 6/12/2025).    Lauren has a current medication list which includes the following prescription(s): hydroxyzine pamoate, omeprazole, ondansetron, and rizatriptan.    Review of patient's allergies indicates:   Allergen Reactions    Codeine Other (See Comments)     vomitting        Objective      Upper Extremity Sensation  General Upper Extremity Sensation  Intact: Right and Left  Right Upper Extremity Sensation  Intact: Light Touch, Sharp/Dull Discrimination, Kinesthesia, Proprioception, and Hot/Cold Discrimination  Right Upper Extremity Sensation Stocking Glove Pattern: No    Left Upper Extremity Sensation  Intact: Light Touch, Sharp/Dull Discrimination, Kinesthesia, Proprioception, and Hot/Cold Discrimination  Left Upper Extremity Sensation Stocking Glove Pattern: No             Shoulder Range of Motion  Right Shoulder   Active (deg) Passive (deg) Pain   Flexion 175       Extension 70       ABduction 170         Left Shoulder   Active (deg) Passive (deg) Pain   Flexion 160       Extension 50       ABduction 120                     Shoulder Strength - Planes of Motion   Right Strength Right Pain Left Strength Left  Pain   Flexion 5   5     ABduction 5   5     Internal Rotation 0° 5   5     External Rotation 0° 5   5         Elbow Strength   Right Strength Right Pain Left Strength Left  Pain   Flexion (C6) 5   5     Extension (C7) 5   5            Wrist Strength - Planes of Motion   Right Strength Right Pain Left Strength Left  Pain   Flexion 5   5     Extension 5   5       Wrist Strength Details      strength is good bilaterally      Upper Extremity Girth Measurements (in centimeters) taken in short sitting  [place] Right upper extremity  Date: 6/30/25 Left upper extremity  Date: 6/30/25   Thumb 6.5 cm 6.5 cm   Metacarpal Head 19 cm 18.5 cm   Wrist 14.5 cm 14 cm   (+) 4cm 15  cm 14.5  cm   (+) 8cm 16.5 cm 16.5 cm   (+) 12cm 19 cm 19 cm   (+) 16cm 22.5 cm 22.5 cm   (+) 20cm 24.5 cm 24 cm   (+) 24cm 24.5 cm 24 cm   (+) 28cm 24 cm 23.5 cm   (+) 32cm 25.5 cm 25 cm   (+) 36cm 28 cm 28 cm   (+) 40cm 29.5 cm 29.5 cm   Total Girth Measurement 269 cm 265.5 cm   Total Girth Difference 3.5 cm -   Total Girth Reduction -  -     Total Girth Difference     At site of Greatest Girth Difference     Mild= <5 cm      Mild = <2 cm        Mild/Moderate= 5.0-10 cm    Mild/Mod = 2 cm- 3.5 cm  Moderate =10.1 cm- 20 cm    Moderate= 3.6 cm - 5.0 cm  Mod/Severe = 20.1- 30 cm    Moderate /Severe= 5.1 cm- 6.5 cm  Severe = > 30 cm     Severe= > 6.5 cm            Treatment:  Therapeutic Exercise  TE 1: pt performs 5 reps each of shoulder flexion and ABD wall wash.  Verbally reviewed scapular retractions.  Other Activities  Activity 1: Self-care/home management: Discussed plan of care, scheduling.  Pt was educated in lymphatic anatomy & function, lymphedema etiology and management plans. Pt was provided with written risk reductions and precautions for managing lymphedema.    Time Entry(in minutes):  PT Evaluation (Low) Time Entry: 30  Therapeutic Exercise Time Entry: 5  Self Care/Home Management (ADLs) Time Entry: 10    Assessment & Plan   Assessment  Lauren presents with a condition of Low complexity.   Presentation of Symptoms: Stable  Will Comorbidities Impact Care: Yes  Cancer, upcoming radiation    Functional Limitations: Activity tolerance, Reaching, Range of motion  Personal Factors Affecting Prognosis: Pain    Patient Goal for Therapy (PT): return to prior level of activity/function  Prognosis: Excellent  Assessment Details: This patient is referred to outpatient physical therapy and presents with a medical diagnosis of left breast cancer and was seen today post-operatively to establish PT plan of care for impairments following surgery listed above.    Pt instructed in HEP this session and was able to  perform all exercises given independently. Pt instructed to follow up with therapist if any concerns arise with program established. Pt will continue to benefit from skilled physical therapy to address the documented impairments and goals, to provide patient/family education, and to maximize pt's level of independence in home and community environment    Plan  From a physical therapy perspective, the patient would benefit from: Skilled Rehab Services    Planned therapy interventions include: Therapeutic exercise, Therapeutic activities, Neuromuscular re-education, Manual therapy, and ADLs/IADLs.            Visit Frequency: 1 times Per Week for 8 Weeks.                     The patient's spiritual, cultural, and educational needs were considered, and the patient is agreeable to the plan of care and goals.     Education  Education was done with Patient. The patient's learning style includes Demonstration, Listening, and Pictures/video. The patient Demonstrates understanding and Verbalizes understanding.         Educated in home exercise program, POC, scheduling.       Goals:   Active       Long Term Goals       Pt will increase AROM/PROM in shoulder abduction ROM to >/=170 degrees on left to improve functional reach, carry, push, pull pain free.        Start:  06/30/25    Expected End:  08/25/25            Pt will be I with updated HEP for improved functional mobility, posture, strength, and endurance.       Start:  06/30/25    Expected End:  08/25/25            Pt will increase AROM/PROM in shoulder abduction ROM to >/=170 degrees on left to improve functional reach, carry, push, pull pain free.        Start:  06/30/25    Expected End:  08/25/25            Pt will demonstrate full/maximized tissue mobility to increase ROM and promote healthy tissue to be pain free at discharge.        Start:  06/30/25    Expected End:  08/25/25               Short Term Goals       Pt will tolerate HEP for improved strength, functional  mobility, ROM, posture, and endurance.        Start:  06/30/25    Expected End:  07/28/25            Patient will demonstrate 100% understanding of lymphedema risk reduction practices to include self monitoring for lymphedema.        Start:  06/30/25    Expected End:  07/28/25            Pt will increase AROM/PROM in shoulder abduction ROM to >/=150 degrees on left to improve functional reach, carry, push, pull pain free.        Start:  06/30/25    Expected End:  07/28/25            Pt will increase AROM/PROM in shoulder flexion to >/=170 degrees on left to improve functional reach, carry, push, pull pain free.         Start:  06/30/25    Expected End:  07/28/25            pt will increase L shoulder ext to 70 degrees for improved functional reach       Start:  06/30/25    Expected End:  07/28/25                Priscilla Gillespie, PT

## 2025-07-01 ENCOUNTER — HOSPITAL ENCOUNTER (OUTPATIENT)
Dept: RADIATION THERAPY | Facility: HOSPITAL | Age: 45
Discharge: HOME | End: 2025-07-01
Attending: RADIOLOGY
Payer: COMMERCIAL

## 2025-07-02 ENCOUNTER — RESULTS FOLLOW-UP (OUTPATIENT)
Dept: OBSTETRICS AND GYNECOLOGY | Facility: HOSPITAL | Age: 45
End: 2025-07-02

## 2025-07-02 ENCOUNTER — OFFICE VISIT (OUTPATIENT)
Dept: ORTHOPEDICS | Facility: CLINIC | Age: 45
End: 2025-07-02
Payer: COMMERCIAL

## 2025-07-02 ENCOUNTER — HOSPITAL ENCOUNTER (OUTPATIENT)
Dept: RADIOLOGY | Facility: OTHER | Age: 45
Discharge: HOME | End: 2025-07-02
Payer: COMMERCIAL

## 2025-07-02 DIAGNOSIS — M25.541 PAIN INVOLVING JOINT OF FINGER OF RIGHT HAND: ICD-10-CM

## 2025-07-02 DIAGNOSIS — M79.642 LEFT HAND PAIN: ICD-10-CM

## 2025-07-02 DIAGNOSIS — M79.642 LEFT HAND PAIN: Primary | ICD-10-CM

## 2025-07-02 DIAGNOSIS — S63.650A SPRAIN OF METACARPOPHALANGEAL (MCP) JOINT OF RIGHT INDEX FINGER, INITIAL ENCOUNTER: ICD-10-CM

## 2025-07-02 PROCEDURE — 73130 X-RAY EXAM OF HAND: CPT | Mod: 26,LT,, | Performed by: RADIOLOGY

## 2025-07-02 PROCEDURE — 73130 X-RAY EXAM OF HAND: CPT | Mod: TC,FY,LT

## 2025-07-02 PROCEDURE — 3044F HG A1C LEVEL LT 7.0%: CPT | Mod: CPTII,S$GLB,,

## 2025-07-02 PROCEDURE — 1159F MED LIST DOCD IN RCRD: CPT | Mod: CPTII,S$GLB,,

## 2025-07-02 PROCEDURE — 99999 PR PBB SHADOW E&M-EST. PATIENT-LVL II: CPT | Mod: PBBFAC,,,

## 2025-07-02 PROCEDURE — 99213 OFFICE O/P EST LOW 20 MIN: CPT | Mod: 25,S$GLB,,

## 2025-07-02 PROCEDURE — 20600 DRAIN/INJ JOINT/BURSA W/O US: CPT | Mod: RT,S$GLB,,

## 2025-07-02 RX ADMIN — METHYLPREDNISOLONE ACETATE 40 MG: 40 INJECTION, SUSPENSION INTRA-ARTICULAR; INTRALESIONAL; INTRAMUSCULAR; SOFT TISSUE at 10:07

## 2025-07-02 NOTE — PROGRESS NOTES
[facility]  Established Patient  Orthopedics  Lauren Schmid presents for follow up evaluation of   Encounter Diagnoses   Name Primary?    Left hand pain Yes    Sprain of metacarpophalangeal (MCP) joint of right index finger, initial encounter     Pain involving joint of finger of right hand      Interval HPI 7/2/2025:  Patient presents for follow up of right index MCP joint pain that began about 10 weeks ago after a busy work period in [employer] during [employer]. She reports persistent pain in the finger with no improvement, despite a period of rest while recovering from breast surgery. She has began experiencing similar pain in the left index MCP joint, which she attributes to compensating with this hand. Right hand pain is worse than left.  Pain is intermittent and sometimes excruciating, described as severe and sudden when performing certain activities. Pain is exacerbated by activities such as bending fingers, applying pressure, turning keys, and holding trays at work. It is worse when bending fingers compared to extending them. She reports difficulty with daily activities, including eating. She denies numbness or tingling. She denies any known family history of rheumatoid arthritis or lupus. We discussed steroid injections last visit and she would like to attempt steroid injection in right hand today.     HPI 6/4/2025  Lauren Schmid is a 44 y.o. 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 She locates the pain and swelling on the index MCP joint, most prominent ulnarly. Pain fluctuates in severity, and it is not painful at this current moment. She describes sudden sharp, hot pains in the index MCPJ throughout the day, even when at rest.  She reports occasional feelings of heat in the area, along with intermittent redness and swelling. She has difficulty pinching items with any weight or resistance, such as turning a key in a lock and holding a fork, describing it as significantly painful. Activities such as picking up a chip to eat chips and salsa have become difficult. She also has pain when holding a tray on her palm with fingers extended and spread out. She has begun using the left hand more frequently and is starting to feel mild symptoms in the left hand as well, though not as severe as in the right. Patient denies any clicking, catching, triggering, or locking of the finger. She denies any radiating pains, numbness, or tingling.     Patient reports she sought care at urgent care about 2 weeks ago and was given an anti-inflammatory shot, which provided temporary relief for about 48 hours. She was advised to use warm compresses and squeeze a tennis ball 3 times daily. An XR was performed, initially ruling out a stress fracture. However, she was later called back due to a concerning finding in the wrist, described as two bones being too far apart or spread. A wrist brace was given, which does not help the finger pain and causes irritation. Patient denies any wrist pain or limitations in wrist motion. She denies any prior wrist traumas, injuries, or surgeries. Patient denies any prior history of trauma or surgeries to the wrist or hand.     She discloses a recent breast cancer diagnosis and upcoming surgery on June 12, causing significant stress.     WORK STATUS:  - Works as a manager at [employer]  - Work activities cause pain in right hand, particularly index finger  - Has started using left hand more for tasks   - Will be off work from June 12th to June 21st due to upcoming surgery     Review of Systems:  As per HPI, otherwise noncontributory.    Physical Exam:  Vitals:    07/02/25 1037   PainSc:   7   PainLoc: Hand      AA&O x 4.   NAD  HEENT:  NCAT, sclera nonicteric  Lungs:  Respirations are equal and unlabored.  CV:  2+ bilateral upper and lower extremity pulses.  MSK: Mild swelling bilateral index MCP joints. TTP ulnar aspect of bilateral index MCP joints, worse on right. Full ROM bilateral fingers and hands, + pain with MCP joint flexion > extension in bilateral index fingers. Able to make full composite fists bilaterally with + pain in the index fingers, R > L. Flexor and extensor tendon function intact. No laxity, instability, or pain with varus/valgus stress bilateral index MCP joints. No catching, triggering, locking with finger ROM.   No tenderness right scapholunate interval, negative watsons test. Full ROM bilateral wrists, painless. Full ROM bilateral elbows, painless. Neurovascularly intact bilaterally.  5/5 thenar and intrinsic musculature strength.    Diagnostic studies and other clinical records review:  I independently viewed the patient's imaging as well as the radiology report.    My personal interpretation of X-rays AP, lateral, oblique left hand demonstrate well preserved cartilage spaces with no acute fracture or dislocation.    My personal interpretation of X-rays AP, lateral, oblique right hand taken 5/22/2025 demonstrate widening of scapholunate interval with well preserved cartilage spaces and no acute fracture or dislocation.   Assessment/Plan:   Encounter Diagnoses   Name Primary?    Left hand pain Yes    Sprain of metacarpophalangeal (MCP) joint of right index finger, initial encounter     Pain involving joint of finger of right hand      The patient and I had a thorough discussion today. We discussed the working diagnosis as well as several other potential alternative diagnoses. Treatment options were discussed, both conservative and surgical. Conservative treatment options would include things such as activity modifications, workplace modifications, a period of rest, oral vs topical OTC and prescription  anti-inflammatory medications, occupational therapy, splinting/bracing, immobilization, corticosteroid injections, and others. Surgical options were discussed as well.     Patient would like to attempt steroid injection for right index finger pain today. She will hold off on left index finger steroid injection at this time. Administered steroid injection into the right index MCP joint today in clinic, patient tolerated well. Take OTC pain medication and ice the area as needed for post-injection discomfort.   We discussed potential autoimmune panel to rule out rheumatoid condition if bilateral hand symptoms progress.   Continue light use of the hands, rest the index fingers  Apply topical voltaren gel as needed for hand pains  Follow up virtually or in clinic as needed for any persistent or worsening pain.   Call with any questions/concerns in the interim    Injection Procedure:  -I have offered her a selective injection. I have explained the risks, benefits, and alternatives of the procedure in detail.  The patient voices understanding and all questions have been answered. The patient agrees to proceed as planned. So after a sterile prep of the skin in the normal fashion the right index MCP joint injected using a 25 gauge needle with a combination of 0.5 cc 1% plain lidocaine and 0.5 cc of methylprednisolone.  The patient is cautioned and immediate relief of pain is secondary to the local anesthetic and will be temporary.  After the anesthetic wears off there may be a increase in pain that may last for a few hours or a few days and they should use ice to help alleviate this flair up of pain. Patient tolerated the procedure well.     The patient's pathophysiology was explained in detail with reference to x-rays, models, other visual aids as appropriate. Treatment options were discussed in detail.  Questions were invited and answered to the patient's satisfaction. I reviewed Primary care and other specialty's notes to  better coordinate patient's care.     Gayathri Ragland PA-C  [facility] Upper Extremity     This note was generated with the assistance of ambient listening technology. Verbal consent was obtained by the patient and accompanying visitor(s) for the recording of patient appointment to facilitate this note. I attest to having reviewed and edited the generated note for accuracy, though some syntax or spelling errors may persist. Please contact the author of this note for any clarification.     Please be aware that this note has been generated with the assistance of MModal voice-to-text. Please excuse any spelling or grammatical errors.

## 2025-07-02 NOTE — PROCEDURES
Small Joint Aspiration/Injection: R index MCP    Date/Time: 7/2/2025 10:30 AM    Performed by: Gayathri Ragland PA-C  Authorized by: Gayathri Ragland PA-C    Consent Done?:  Yes (Verbal)  Indications:  Pain  Timeout: prior to procedure the correct patient, procedure, and site was verified    Prep: patient was prepped and draped in usual sterile fashion      Local anesthesia used?: Yes    Anesthesia:  Local infiltration  Local anesthetic:  Lidocaine 1% without epinephrine  Anesthetic total (ml):  0.5    Location:  Index finger  Site:  R index MCP  Needle size:  25 G  Approach:  Ulnar  Medications:  40 mg methylPREDNISolone acetate 40 mg/mL  Patient tolerance:  Patient tolerated the procedure well with no immediate complications

## 2025-07-06 RX ORDER — METHYLPREDNISOLONE ACETATE 40 MG/ML
40 INJECTION, SUSPENSION INTRA-ARTICULAR; INTRALESIONAL; INTRAMUSCULAR; SOFT TISSUE
Status: DISCONTINUED | OUTPATIENT
Start: 2025-07-02 | End: 2025-07-06 | Stop reason: HOSPADM

## 2025-07-07 ENCOUNTER — PATIENT MESSAGE (OUTPATIENT)
Dept: SURGERY | Facility: CLINIC | Age: 45
End: 2025-07-07
Payer: COMMERCIAL

## 2025-07-08 ENCOUNTER — CLINICAL SUPPORT (OUTPATIENT)
Dept: REHABILITATION | Facility: HOSPITAL | Age: 45
End: 2025-07-08
Payer: COMMERCIAL

## 2025-07-08 DIAGNOSIS — Z91.89 AT RISK FOR LYMPHEDEMA: ICD-10-CM

## 2025-07-08 DIAGNOSIS — Z74.09 IMPAIRED FUNCTIONAL MOBILITY AND ACTIVITY TOLERANCE: ICD-10-CM

## 2025-07-08 DIAGNOSIS — M25.612 DECREASED ROM OF LEFT SHOULDER: Primary | ICD-10-CM

## 2025-07-08 PROCEDURE — 97110 THERAPEUTIC EXERCISES: CPT

## 2025-07-08 PROCEDURE — 97140 MANUAL THERAPY 1/> REGIONS: CPT

## 2025-07-08 PROCEDURE — 97112 NEUROMUSCULAR REEDUCATION: CPT

## 2025-07-08 NOTE — PATIENT INSTRUCTIONS
"      prayer stretch    - Begin seated in a chair, with good posture, core tight.  Place a large physioball in between your legs.  Place hands on physioball and slowly bend forward as far as you can, allowing the ball to roll forward with you.  Hold for the desired amount of time and roll back up into starting position. Now, repeat, but instead of rolling forward, roll towards the left (about 45 degrees) so you feel a stretch in the RIGHT side of the back. Now, repeat, but instead of rolling forward, roll towards the right (about 45 degrees) so you feel a stretch in the LEFT side of the back.  Do 10 reps in each direction.     Flexibility: "This Feels Great" Stretch     Holding chair, back away from it, place feet shoulder width apart and tuck head between arms. To end, walk toward chair.  Repeat 5-10 times and hold 30 sec. Do 1-2 times a day.        Gentle Lower trunk rotation with butterfly stretch 3 x 10 reps each side           Theraband shoulder external rotation    Hold the band out to the front with both hands, elbows at your sides and bent 90 degrees.     Stretch the band apart as far as you can without pain. Keep the elbows in contact with your ribs. Squeeze the shoulder blades together.   Then return to start position.  Do 10 reps per set. Do 3 sets per session. Do 1 sessions per day          ELASTIC BAND BILATERAL HORIZONTAL ABDUCTION    Start by holding an elastic band in front of your chest with your elbows straight. Then, pull your arms apart and towards the side. Return to starting position and repeat.   Do 10 reps per set. Do 3 sets per session. Do 1 session per day.  "

## 2025-07-09 NOTE — PROGRESS NOTES
Outpatient Rehab    Physical Therapy Visit    Patient Name: Lauren Schmid  MRN: [id number]  YOB: 1980  Encounter Date: 7/8/2025    Therapy Diagnosis:   Encounter Diagnoses   Name Primary?    Decreased ROM of left shoulder Yes    Impaired functional mobility and activity tolerance     At risk for lymphedema      Physician: Miriam Tan PA-C    Physician Orders: Eval and Treat  Medical Diagnosis: Malignant neoplasm of overlapping sites of left breast in female, estrogen receptor positive  Surgical Diagnosis: Not applicable for this Episode   Surgical Date: Not applicable for this Episode  Days Since Last Surgery: Not applicable for this Episode    Visit # / Visits Authorized:  1 / 20  Insurance Authorization Period: 6/27/2025 to 12/31/2025  Date of Evaluation: 6/30/2025  Plan of Care Certification:       PT/PTA:     Number of PTA visits since last PT visit:   Time In: 1121   Time Out: 1200  Total Time (in minutes): 39   Total Billable Time (in minutes): 39    FOTO:  Intake Score: 52%  Survey Score 2:  %  Survey Score 3:  %    Precautions:     Standard, Cancer. Limiting lifting > 10#      Subjective   She has been doing her exercises, and she is able to reach higher up on the wall. No pain this morning Fatigue: 1/10.  Pain reported as 0/10.      Objective      Shoulder Range of Motion  Right Shoulder   Active (deg) Passive (deg) Pain   Flexion 180       Extension 75       ABduction 170       External Rotation (Shoulder ABducted 90 degrees) 90       Internal Rotation (Shoulder ABducted 90 degrees) 90         Left Shoulder   Active (deg) Passive (deg) Pain   Flexion 170       Extension 60       ABduction 160       External Rotation (Shoulder ABducted 90 degrees) 94       Internal Rotation (Shoulder ABducted 90 degrees) 90              Treatment:  Therapeutic Exercise  TE 1: Shoulder pulleys, flex/scap 2 mintues each  TE 2: LTR with hands behind head 2 minutes  TE 3: Standing child's pose using  table 3 ways, 30 second hold 2x each  TE 4: Physioball roll outs, 3 ways, 2 minutes  TE 5: Seated posterior capsule stretch 3 x 30 second hold  TE 6: Supine pec stretch on foam roller 2 minutes  Manual Therapy  MT 1: Acromial retraction, Pectoralis stretch, Pectoralis stretch with abduction , Axillary stretch  MT 2: Gentle manual trigger point release L axilla  Balance/Neuromuscular Re-Education  NMR 1: B shoulder ER, blue band, 1 set x 10 reps  NMR 2: Horizontal abduction, blue band, 1 set x 10 reps      Time Entry(in minutes):  Manual Therapy Time Entry: 10  Neuromuscular Re-Education Time Entry: 10  Therapeutic Exercise Time Entry: 25    Assessment & Plan   Assessment: She was able to demonstrate an increase in AROM of her L shoulder as noted in the objective section. She was able to perform all of today's activities with no increase in symptoms prior to leaving the clinic. She endorsed adequate stretch with activities on the foam roller.   Evaluation/Treatment Tolerance: Patient tolerated treatment well    The patient will continue to benefit from skilled outpatient physical therapy in order to address the deficits listed in the problem list on the initial evaluation, provide patient and family education, and maximize the patients level of independence in the home and community environments.     The patient's spiritual, cultural, and educational needs were considered, and the patient is agreeable to the plan of care and goals.           Plan: Continue with the plan of care and progress as tolerated.    Goals:   Active       Long Term Goals       Pt will increase AROM/PROM in shoulder abduction ROM to >/=170 degrees on left to improve functional reach, carry, push, pull pain free.  (Progressing)       Start:  06/30/25    Expected End:  08/25/25            Pt will be I with updated HEP for improved functional mobility, posture, strength, and endurance. (Progressing)       Start:  06/30/25    Expected End:   08/25/25            Pt will increase AROM/PROM in shoulder abduction ROM to >/=170 degrees on left to improve functional reach, carry, push, pull pain free.  (Progressing)       Start:  06/30/25    Expected End:  08/25/25            Pt will demonstrate full/maximized tissue mobility to increase ROM and promote healthy tissue to be pain free at discharge.  (Progressing)       Start:  06/30/25    Expected End:  08/25/25               Short Term Goals       Pt will tolerate HEP for improved strength, functional mobility, ROM, posture, and endurance.  (Progressing)       Start:  06/30/25    Expected End:  07/28/25            Patient will demonstrate 100% understanding of lymphedema risk reduction practices to include self monitoring for lymphedema.  (Progressing)       Start:  06/30/25    Expected End:  07/28/25            Pt will increase AROM/PROM in shoulder abduction ROM to >/=150 degrees on left to improve functional reach, carry, push, pull pain free.  (Progressing)       Start:  06/30/25    Expected End:  07/28/25            Pt will increase AROM/PROM in shoulder flexion to >/=170 degrees on left to improve functional reach, carry, push, pull pain free.   (Progressing)       Start:  06/30/25    Expected End:  07/28/25            pt will increase L shoulder ext to 70 degrees for improved functional reach (Progressing)       Start:  06/30/25    Expected End:  07/28/25                Mitzi Vasquez, PT

## 2025-07-11 NOTE — PROGRESS NOTES
PATIENT IDENTIFICATION:  Patient Name: Lauren Schmid  MRN: [id number]  : 1980    DIAGNOSIS:  Cancer Staging   Malignant neoplasm of overlapping sites of left breast in female, estrogen receptor positive  Staging form: Breast, AJCC 8th Edition  - Clinical stage from 2025: Stage IA (cT1b, cN0, cM0, G2, ER+, SD+, HER2-) - Signed by Naomi Jacob MD on 2025      HISTORY OF PRESENT ILLNESS:   The patient is a 44-year-old woman with a stage IA invasive carcinoma of the left breast.  She is status post breast conserving surgery and has been referred for consideration of adjuvant radiation therapy.    Biopsy of of the left breast lesion at the 12 o'clock position revealed a grade 2 invasive ductal carcinoma.  On immunohistochemistry, the tumor cells were ER strongly positive in 90% of cells, SD weakly positive in 20% of cells and [id number] negative.  The Ki-67 proliferative index was 30%.    The patient was taken to the operating room on 2025 for left breast lumpectomy with sentinel lymph node biopsy.  Final pathology revealed invasive carcinoma with ductal and lobular features measuring 16 mm, grade 2.  There was associated intermediate nuclear grade DCIS present measuring 20 mm.  3 sentinel lymph nodes were negative for evidence of metastatic carcinoma.  The margins of resection were negative for invasive carcinoma with the closest margin being posterior at 4 mm.  The margins of resection were negative for DCIS with there being at least 5 mm clearance around the tumor.      Oncotype recurrence score is 17.  Oncology History   Malignant neoplasm of overlapping sites of left breast in female, estrogen receptor positive   2025 Cancer Staged    Staging form: Breast, AJCC 8th Edition  - Clinical stage from 2025: Stage IA (cT1b, cN0, cM0, G2, ER+, SD+, HER2-)     2025 Initial Diagnosis    Malignant neoplasm of overlapping sites of left breast in female, estrogen receptor positive           REVIEW OF SYSTEMS:   Review of Systems   Constitutional:  Negative for fever, malaise/fatigue and weight loss.   HENT:  Negative for ear pain, hearing loss, sinus pain and sore throat.    Eyes:  Negative for blurred vision, double vision and pain.   Respiratory:  Negative for cough, hemoptysis, shortness of breath and wheezing.    Cardiovascular:  Negative for chest pain, palpitations and leg swelling.   Gastrointestinal:  Negative for abdominal pain, blood in stool, constipation, diarrhea, heartburn, nausea and vomiting.   Genitourinary:  Negative for dysuria, frequency, hematuria and urgency.   Musculoskeletal:  Positive for back pain. Negative for joint pain.   Skin:  Negative for itching and rash.   Neurological:  Positive for headaches. Negative for tingling, focal weakness and seizures.   Psychiatric/Behavioral:  Negative for depression. The patient is not nervous/anxious.        PAST MEDICAL HISTORY:  Past Medical History:   Diagnosis Date    Breast cancer     Herpes simplex virus (HSV) infection     Plantar fasciitis of right foot 10/2022       PAST SURGICAL HISTORY:  Past Surgical History:   Procedure Laterality Date    INJECTION FOR SENTINEL NODE IDENTIFICATION Left 6/12/2025    Procedure: INJECTION, FOR SENTINEL NODE IDENTIFICATION;  Surgeon: Tamar Patterson MD;  Location: [facility];  Service: General;  Laterality: Left;    MASTECTOMY, PARTIAL Left 6/12/2025    Procedure: MASTECTOMY, PARTIAL-left with radiological marker;  Surgeon: Tamar Patterson MD;  Location: [facility];  Service: General;  Laterality: Left;    MOUTH SURGERY N/A 04/07/2025    SENTINEL LYMPH NODE BIOPSY Left 6/12/2025    Procedure: BIOPSY, SENTINEL LYMPH NODE;  Surgeon: Tamar Patterson MD;  Location: [facility];  Service: General;  Laterality: Left;       ALLERGIES:   Review of patient's allergies indicates:   Allergen Reactions    Codeine Other (See Comments)     vomitting       MEDICATIONS:  Current Outpatient Medications   Medication Sig     hydrOXYzine pamoate (VISTARIL) 25 MG Cap TAKE 1 CAPSULE (25 MG TOTAL) BY MOUTH DAILY AS NEEDED.    omeprazole (PRILOSEC) 40 MG capsule Take 1 capsule (40 mg total) by mouth once daily.    ondansetron (ZOFRAN-ODT) 8 MG TbDL dissolve 1 tablet (8 mg total) by mouth every 6 (six) hours as needed. (Patient not taking: Reported on 2025)    rizatriptan (MAXALT) 10 MG tablet Take one tablet (10 mg) at the onset of headaches; if symptoms persist or return, may repeat dose after 2 hours. maximum dose: 20 mg per 24 hours     No current facility-administered medications for this visit.       SOCIAL HISTORY:  Social History[1]    FAMILY HISTORY:  Family History   Problem Relation Name Age of Onset    Breast cancer Neg Hx      Colon cancer Neg Hx      Ovarian cancer Neg Hx      Esophageal cancer Neg Hx           PHYSICAL EXAMINATION:  There were no vitals filed for this visit.  There is no height or weight on file to calculate BMI.    ECO  Physical Exam  Constitutional:       Appearance: Normal appearance.   HENT:      Head: Normocephalic and atraumatic.      Nose: Nose normal.      Mouth/Throat:      Mouth: Mucous membranes are moist.   Cardiovascular:      Rate and Rhythm: Normal rate.   Pulmonary:      Effort: Pulmonary effort is normal.   Abdominal:      General: Abdomen is flat.      Palpations: Abdomen is soft.   Musculoskeletal:         General: Normal range of motion.      Cervical back: Normal range of motion.   Skin:     General: Skin is warm and dry.   Neurological:      General: No focal deficit present.      Mental Status: She is alert. Mental status is at baseline.             ASSESSMENT/PLAN:  Diagnoses and all orders for this visit:    Malignant neoplasm of overlapping sites of left breast in female, estrogen receptor positive        The patient is recommended adjuvant radiation to the left breast to reduce risk of recurrence and improve survival.  She will receive 4 weeks of daily EBRT.  The patient  will be treated using deep inspiration breath hold technique to minimize radiation dose delivered to the heart and lungs.    The risks and benefits of treatment have been discussed with the patient and she expressed full understanding. she understands the treatment plan and willing to proceed accordingly.    I spent approximately 60 minutes reviewing the available records and evaluating the patient, out of which over 50% of the time was spent face to face with the patient in counseling and coordinating this patient's care.           [1]   Social History  Socioeconomic History    Marital status:    Tobacco Use    Smoking status: Never    Smokeless tobacco: Never   Vaping Use    Vaping status: Never Used   Substance and Sexual Activity    Alcohol use: Yes     Alcohol/week: 14.0 standard drinks of alcohol     Types: 14 Glasses of wine per week    Drug use: No    Sexual activity: Yes     Partners: Male     Birth control/protection: OCP     Social Drivers of Health     Financial Resource Strain: High Risk (3/24/2025)    Overall Financial Resource Strain (CARDIA)     Difficulty of Paying Living Expenses: Hard   Food Insecurity: Food Insecurity Present (3/24/2025)    Hunger Vital Sign     Worried About Running Out of Food in the Last Year: Sometimes true     Ran Out of Food in the Last Year: Never true   Transportation Needs: No Transportation Needs (3/24/2025)    PRAPARE - Transportation     Lack of Transportation (Medical): No     Lack of Transportation (Non-Medical): No   Physical Activity: Sufficiently Active (3/24/2025)    Exercise Vital Sign     Days of Exercise per Week: 7 days     Minutes of Exercise per Session: 60 min   Stress: No Stress Concern Present (3/24/2025)    [employer] - Occupational Stress Questionnaire     Feeling of Stress : Only a little   Housing Stability: High Risk (3/24/2025)    Housing Stability Vital Sign     Unable to Pay for Housing in the Last Year: Yes      Number of Times Moved in the Last Year: 0     Homeless in the Last Year: No

## 2025-07-14 ENCOUNTER — OFFICE VISIT (OUTPATIENT)
Dept: RADIATION ONCOLOGY | Facility: CLINIC | Age: 45
End: 2025-07-14
Payer: COMMERCIAL

## 2025-07-14 DIAGNOSIS — Z17.0 MALIGNANT NEOPLASM OF OVERLAPPING SITES OF LEFT BREAST IN FEMALE, ESTROGEN RECEPTOR POSITIVE: Primary | ICD-10-CM

## 2025-07-14 DIAGNOSIS — C50.812 MALIGNANT NEOPLASM OF OVERLAPPING SITES OF LEFT BREAST IN FEMALE, ESTROGEN RECEPTOR POSITIVE: Primary | ICD-10-CM

## 2025-07-14 PROCEDURE — 3044F HG A1C LEVEL LT 7.0%: CPT | Mod: CPTII,S$GLB,, | Performed by: RADIOLOGY

## 2025-07-14 PROCEDURE — 99205 OFFICE O/P NEW HI 60 MIN: CPT | Mod: S$GLB,,, | Performed by: RADIOLOGY

## 2025-07-15 ENCOUNTER — CLINICAL SUPPORT (OUTPATIENT)
Dept: REHABILITATION | Facility: HOSPITAL | Age: 45
End: 2025-07-15
Payer: COMMERCIAL

## 2025-07-15 DIAGNOSIS — M25.612 DECREASED ROM OF LEFT SHOULDER: Primary | ICD-10-CM

## 2025-07-15 DIAGNOSIS — Z91.89 AT RISK FOR LYMPHEDEMA: ICD-10-CM

## 2025-07-15 DIAGNOSIS — Z74.09 IMPAIRED FUNCTIONAL MOBILITY AND ACTIVITY TOLERANCE: ICD-10-CM

## 2025-07-15 PROCEDURE — 97110 THERAPEUTIC EXERCISES: CPT

## 2025-07-15 PROCEDURE — 97112 NEUROMUSCULAR REEDUCATION: CPT

## 2025-07-15 NOTE — PATIENT INSTRUCTIONS
Foam Roller: Arm Scissors    -Position on foam roller: Head and tailbone resting on foam roll with knees, keeping the spine contacted with the roll at all times.     Starting with hands pointed toward the ceiling and Palms facing each other, allow one arm to lower towards your head/ground, as that arm returns to the starting position, the opposite arm should be lowering to your head/ground.  Do 10 reps per set. Do 2 sets per session. Do 1 session per day.        Snow Angles     Laying on your back with a foam roller placed long ways underneath your back.       From this position slowly wave your arms as the picture represents, hold some small weights to facilitate the stretch you should feel across the from of your shoulder and chest.   Do 10 reps per set. Do 2 sets per session. Do 1 session per day.        Scapular protraction/retraction on foam roller    Laying with a foam roller lengthwise under your spine, and holding light to medium weights in each hand (5# dumbbells pictured,) focus on first letting your shoulders relax down toward the ground as far as you can (photo on the left.)  Envision bringing the back of both shoulders as close to the ground as you can and the middle edge of your shoulder blade as close to the foam roller as you can.  Let the weight help push your shoulders down, but keep your elbows straight.  After around 5 seconds of that, you'll do the opposite- push your shoulders up toward the ceiling as far as you can (photo on the right.)  Hold here for about 3 seconds. Do 10 reps per set. Do 2 sets per session. Do 1 session per day.        Seated Rows    Stick leg out in front with knee straight, wrap elastic band around foot to anchor, slowly pull into armpits and slowly return.  If it is too easy grab closer to foot, if too hard grab farther from foot.            Banded Shoulder Diagonals    Standing with tall posture and engaged core.    Pull the band across your chest in diagonal  "direction.    Keep elbows straight through the entire pull, squeezing your shoulder blades together as you pull.    Hand going up should be "thumb up" hand position.     Repeat both sides.   Reps 10 per set. Do 3 sets per session. Do 1 session per day.           ELASTIC BAND TRICEPS EXTENSION - SELF FIXATION    While seated, hold and fixate one end of an elastic band against your chest. Hold the other end with your opposite hand with your elbow bent and arm by your side.     Start by pulling the band downward so that the elbow goes from a bent position to a straightened position as shown. Return to starting position and repeat.   Do 10 reps per set. Do 3 sets per session. Do 1 session per day.        ELASTIC BAND BICEP CURLS    Sit in chair and place a long elastic band under your feet and hold the ends with your hands.    Start with your arms at your side while holding the ends of the elastic band. Then, bend your elbows as you raise your hands and pull up the band. Lower back down and repeat.  Do 10 reps per set. Do 3 sets per session. Do 1 session per day.  "

## 2025-07-16 NOTE — PROGRESS NOTES
Outpatient Rehab    Physical Therapy Visit    Patient Name: Lauren Schmid  MRN: [id number]  YOB: 1980  Encounter Date: 7/15/2025    Therapy Diagnosis:   Encounter Diagnoses   Name Primary?    Decreased ROM of left shoulder Yes    Impaired functional mobility and activity tolerance     At risk for lymphedema      Physician: Miriam Tan PA-C    Physician Orders: Eval and Treat  Medical Diagnosis: Malignant neoplasm of overlapping sites of left breast in female, estrogen receptor positive  Surgical Diagnosis: Not applicable for this Episode   Surgical Date: Not applicable for this Episode  Days Since Last Surgery: Not applicable for this Episode    Visit # / Visits Authorized:  2 / 20  Insurance Authorization Period: 6/27/2025 to 12/31/2025  Date of Evaluation: 6/30/2025  Plan of Care Certification:  6/30/2025 to 8/25/25      PT/PTA:     Number of PTA visits since last PT visit:   Time In: 1120   Time Out: 1200  Total Time (in minutes): 40   Total Billable Time (in minutes): 40    FOTO:  Intake Score: 52%  Survey Score 2: Not applicable for this Episode%  Survey Score 3: Not applicable for this Episode%    Precautions:  Additional Precautions and Protocol Details: Standard, Cancer. Limiting lifting > 10#      Subjective   No pain this morning, some muscle soreness after last visit. No difficulty with usual work or home duties, she is not lifting anything heavy.  Fatigue: 1/10.  Pain reported as 0/10.      Objective      Shoulder Range of Motion  Right Shoulder   Active (deg) Passive (deg) Pain   Flexion 180       Extension 60       ABduction 180       External Rotation (Shoulder ABducted 90 degrees) 90       Internal Rotation (Shoulder ABducted 90 degrees) 90         Left Shoulder   Active (deg) Passive (deg) Pain   Flexion 180       Extension 60       ABduction 175       External Rotation (Shoulder ABducted 90 degrees) 95       Internal Rotation (Shoulder ABducted 90 degrees) 90             Treatment:  Therapeutic Exercise  TE 1: Shoulder pulleys, flex/scap 2 mintues each  TE 2: physioball rollouts, 3 ways, 2 minutes  TE 3: Foam roller series: serratus punches, swimmer's, goal post arms, bear hugs 1 set x 10 reps each  Balance/Neuromuscular Re-Education  NMR 1: scapula lift off 1 set x 10 reps each UE  NMR 2: scapula wall clock yellow band, 1 set x 10 reps each UE  NMR 3: diagonals, yellow band, 1 set x 10 reps  NMR 4: rows, blue band 1 set x 10 reps    Time Entry(in minutes):  Neuromuscular Re-Education Time Entry: 15  Therapeutic Exercise Time Entry: 25    Assessment & Plan   Assessment: She was able to perform all of today's new exercises and progressions with no increase in symptoms prior to leaving the clinic. She demonstrated an increase in B shoulder AROM as noted in the objective section.   Evaluation/Treatment Tolerance: Patient tolerated treatment well    The patient will continue to benefit from skilled outpatient physical therapy in order to address the deficits listed in the problem list on the initial evaluation, provide patient and family education, and maximize the patients level of independence in the home and community environments.     The patient's spiritual, cultural, and educational needs were considered, and the patient is agreeable to the plan of care and goals.           Plan: Continue with the plan of care and progress as tolerated.    Goals:   Active       Long Term Goals       Pt will increase AROM/PROM in shoulder abduction ROM to >/=170 degrees on left to improve functional reach, carry, push, pull pain free.  (Progressing)       Start:  06/30/25    Expected End:  08/25/25            Pt will be I with updated HEP for improved functional mobility, posture, strength, and endurance. (Progressing)       Start:  06/30/25    Expected End:  08/25/25            Pt will increase AROM/PROM in shoulder abduction ROM to >/=170 degrees on left to improve functional reach, carry,  push, pull pain free.  (Progressing)       Start:  06/30/25    Expected End:  08/25/25            Pt will demonstrate full/maximized tissue mobility to increase ROM and promote healthy tissue to be pain free at discharge.  (Progressing)       Start:  06/30/25    Expected End:  08/25/25               Short Term Goals       Pt will tolerate HEP for improved strength, functional mobility, ROM, posture, and endurance.  (Progressing)       Start:  06/30/25    Expected End:  07/28/25            Patient will demonstrate 100% understanding of lymphedema risk reduction practices to include self monitoring for lymphedema.  (Progressing)       Start:  06/30/25    Expected End:  07/28/25            Pt will increase AROM/PROM in shoulder abduction ROM to >/=150 degrees on left to improve functional reach, carry, push, pull pain free.  (Progressing)       Start:  06/30/25    Expected End:  07/28/25            Pt will increase AROM/PROM in shoulder flexion to >/=170 degrees on left to improve functional reach, carry, push, pull pain free.   (Progressing)       Start:  06/30/25    Expected End:  07/28/25            pt will increase L shoulder ext to 70 degrees for improved functional reach (Progressing)       Start:  06/30/25    Expected End:  07/28/25                Mitzi Vasquez, PT

## 2025-07-18 ENCOUNTER — OFFICE VISIT (OUTPATIENT)
Dept: HEMATOLOGY/ONCOLOGY | Facility: CLINIC | Age: 45
End: 2025-07-18
Payer: COMMERCIAL

## 2025-07-18 DIAGNOSIS — C50.812 MALIGNANT NEOPLASM OF OVERLAPPING SITES OF LEFT BREAST IN FEMALE, ESTROGEN RECEPTOR POSITIVE: Primary | ICD-10-CM

## 2025-07-18 DIAGNOSIS — Z17.0 MALIGNANT NEOPLASM OF OVERLAPPING SITES OF LEFT BREAST IN FEMALE, ESTROGEN RECEPTOR POSITIVE: Primary | ICD-10-CM

## 2025-07-18 RX ORDER — TAMOXIFEN CITRATE 20 MG/1
20 TABLET ORAL DAILY
Qty: 30 TABLET | Refills: 11 | Status: SHIPPED | OUTPATIENT
Start: 2025-07-18 | End: 2026-07-18

## 2025-07-18 NOTE — PROGRESS NOTES
The patient location is: [place]  The chief complaint leading to consultation is: breast cancer    Visit type: audiovisual    Each patient to whom he or she provides medical services by telemedicine is:  (1) informed of the relationship between the physician and patient and the respective role of any other health care provider with respect to management of the patient; and (2) notified that he or she may decline to receive medical services by telemedicine and may withdraw from such care at any time.    Notes:       [facility]/ [facility]   at [facility] Note:      Chief Complaint:   Encounter Diagnosis   Name Primary?    Malignant neoplasm of overlapping sites of left breast in female, estrogen receptor positive Yes        Cancer Staging   Malignant neoplasm of overlapping sites of left breast in female, estrogen receptor positive  Staging form: Breast, AJCC 8th Edition  - Clinical stage from 2025: Stage IA (cT1b, cN0, cM0, G2, ER+, AZ+, HER2-) - Signed by Naomi Jacob MD on 2025      HPI:  Lauren Schmid is a 44 y.o. female who presents today for evaluation of newly diagnosed breast cancer.     Oncology History  Screening mammogram 25:  focal asymmetry seen in the left breast at 12 o'clock in the posterior depth  Mammogram/ US 25: 1.4 cm mass in the left breast at 12 o'clock, 3 cm from the nipple.    Biopsy 25: IDC, grade 2, ER 90%/ AZ 20%/ Her2 1+; Ki67 30%  Lumpectomy with SLNBx 25: IDC, grade 2, 1.6cm; 0/2 LN+  Oncotype RS 17; RR 5% (6% with tamoxifen based on [facility])     GYN History:  Age of menarche was 16.   Premenopausal - On OCP's.   Patient is .       Problem List[1]    Current Outpatient Medications   Medication Instructions    hydrOXYzine pamoate (VISTARIL) 25 mg, Oral, Daily PRN    omeprazole (PRILOSEC) 40 mg, Oral, Daily    ondansetron (ZOFRAN-ODT) 8 MG TbDL dissolve 1 tablet (8 mg total) by mouth every  6 (six) hours as needed.    rizatriptan (MAXALT) 10 MG tablet Take one tablet (10 mg) at the onset of headaches; if symptoms persist or return, may repeat dose after 2 hours. maximum dose: 20 mg per 24 hours    tamoxifen (NOLVADEX) 20 mg, Oral, Daily, Start 1 week after completing radiation       Review of Systems:   Review of Systems   Constitutional: Negative.    HENT: Negative.     Respiratory: Negative.  Negative for cough and shortness of breath.    Cardiovascular: Negative.  Negative for chest pain.   Gastrointestinal: Negative.  Negative for abdominal pain and diarrhea.   Genitourinary:  Negative for frequency.   Musculoskeletal: Negative.    Skin:  Negative for rash.   Neurological: Negative.    Psychiatric/Behavioral:  The patient is not nervous/anxious.    All other systems reviewed and are negative.      PHYSICAL EXAM:  There were no vitals taken for this visit.    Virtual visit       Pertinent Labs & Imaging:  Pathology Results  (Last 30 days)                 06/19/25 1114  Liquid-Based Pap Smear, Screening Final result            No results found for this or any previous visit (from the past 24 hours).    Assessment & Plan:    1. Malignant neoplasm of overlapping sites of left breast in female, estrogen receptor positive  - tamoxifen (NOLVADEX) 20 MG Tab; Take 1 tablet (20 mg total) by mouth once daily Start 1 week after completing radiation.  Dispense: 30 tablet; Refill: 11    Patient with stage I ER+ breast cancer with low risk oncotype   Long discussion of risk and benefit of tamoxifen and AI  Would not recommend chemotherapy  Calculated potential benefit of OFS/AI vs Tamoxifen using [facility]. 5% distant RR with AI vs 6% distant RR with tamoxifen  Will opt for tamoxifen given hormone history. Has already been established with IO    Start treatment 1 week after radiation    Route Chart for Scheduling    Med Onc Chart Routing      Follow up with physician . January 2026   Follow up with BETTY . First week  of October - symptoms on tamoxifen   Infusion scheduling note    Injection scheduling note    Labs    Imaging    Pharmacy appointment    Other referrals                     MDM includes  :    - Acute or chronic illness or injury that poses a threat to life or bodily function  - Independent review and explanation of 2 results from unique tests  - Extensive discussion of treatment and management  - Prescription drug management  - Drug therapy requiring intensive monitoring for toxicity      Visit today included increased complexity associated with the care of the episodic problem breast cancer addressed and managing the longitudinal care of the patient due to serious and/or complex managed problem(s)        Naomi Jacob MD   07/18/2025                             [1]   Patient Active Problem List  Diagnosis    Lack of coordination    Myalgia    Malignant neoplasm of overlapping sites of left breast in female, estrogen receptor positive    Decreased ROM of left shoulder    Impaired functional mobility and activity tolerance    At risk for lymphedema    Insomnia    Adjustment reaction with anxious mood

## 2025-07-22 ENCOUNTER — CLINICAL SUPPORT (OUTPATIENT)
Dept: REHABILITATION | Facility: HOSPITAL | Age: 45
End: 2025-07-22
Payer: COMMERCIAL

## 2025-07-22 DIAGNOSIS — Z91.89 AT RISK FOR LYMPHEDEMA: ICD-10-CM

## 2025-07-22 DIAGNOSIS — Z74.09 IMPAIRED FUNCTIONAL MOBILITY AND ACTIVITY TOLERANCE: ICD-10-CM

## 2025-07-22 DIAGNOSIS — M25.612 DECREASED ROM OF LEFT SHOULDER: Primary | ICD-10-CM

## 2025-07-22 PROCEDURE — 97110 THERAPEUTIC EXERCISES: CPT

## 2025-07-22 PROCEDURE — 97140 MANUAL THERAPY 1/> REGIONS: CPT

## 2025-07-22 NOTE — PROGRESS NOTES
Outpatient Rehab    Physical Therapy Visit    Patient Name: Lauren Schmid  MRN: [id number]  YOB: 1980  Encounter Date: 7/22/2025    Therapy Diagnosis:   Encounter Diagnoses   Name Primary?    Decreased ROM of left shoulder Yes    Impaired functional mobility and activity tolerance     At risk for lymphedema      Physician: Miriam Tan PA-C    Physician Orders: Eval and Treat  Medical Diagnosis: Malignant neoplasm of overlapping sites of left breast in female, estrogen receptor positive  Surgical Diagnosis: Not applicable for this Episode   Surgical Date: Not applicable for this Episode  Days Since Last Surgery: Not applicable for this Episode    Visit # / Visits Authorized:  3 / 20  Insurance Authorization Period: 6/27/2025 to 12/31/2025  Date of Evaluation: 6/30/2025  Plan of Care Certification:       PT/PTA:     Number of PTA visits since last PT visit:   Time In: 1115   Time Out: 1155  Total Time (in minutes): 40   Total Billable Time (in minutes): 40    FOTO:  Intake Score (%): 52  Survey Score 2 (%): 75  Survey Score 3 (%): Not applicable for this Episode    Precautions:  Additional Precautions and Protocol Details: Standard, Cancer. Limiting lifting > 10#      Subjective   She is having some muscle soreness today, as she may have overdone it with some of her exercises this week. Fatigue 0-1/10.         Objective    Objective Measures updated at progress report unless specified.          Treatment:  Therapeutic Exercise  TE 1: Shoulder pulleys, flex/scap 2 mintues each  TE 2: physioball rollouts, 3 ways, 2 minutes  TE 3: Supine pec stretch on foam roller, 2 minutes  TE 4: foam rolling thoracic spine, 3 minutes  TE 5: foam rolling B lats, 3 minutes each  Manual Therapy  MT 1: IASTM to R forearm, B biceps  Self Care and ADLs  ADL 1: PT discussed with the patient the use of a vibrating massage ball to help with muscle soreness after her work outs      Time Entry(in minutes):  Manual Therapy  Time Entry: 20  Therapeutic Exercise Time Entry: 15  Self Care/Home Management (ADLs) Time Entry: 5    Assessment & Plan   Assessment: She was able to demonstrate radiation position easily during today's session. She was able to perform all of today's exercises with no increase in symptoms. She responded well to manual therapy techniques as she reported feeling less soreness at the end of the session. She endorsed adequate stretch with foam roller activities.   Evaluation/Treatment Tolerance: Patient tolerated treatment well    The patient will continue to benefit from skilled outpatient physical therapy in order to address the deficits listed in the problem list on the initial evaluation, provide patient and family education, and maximize the patients level of independence in the home and community environments.     The patient's spiritual, cultural, and educational needs were considered, and the patient is agreeable to the plan of care and goals.           Plan: Continue with the plan of care and progress as tolerated.    Goals:   Active       Long Term Goals       Pt will increase AROM/PROM in shoulder abduction ROM to >/=170 degrees on left to improve functional reach, carry, push, pull pain free.  (Met)       Start:  06/30/25    Expected End:  08/25/25    Resolved:  07/22/25         Pt will be I with updated HEP for improved functional mobility, posture, strength, and endurance. (Progressing)       Start:  06/30/25    Expected End:  08/25/25            Pt will increase AROM/PROM in shoulder abduction ROM to >/=170 degrees on left to improve functional reach, carry, push, pull pain free.  (Met)       Start:  06/30/25    Expected End:  08/25/25    Resolved:  07/22/25         Pt will demonstrate full/maximized tissue mobility to increase ROM and promote healthy tissue to be pain free at discharge.  (Progressing)       Start:  06/30/25    Expected End:  08/25/25               Short Term Goals       Pt will  tolerate HEP for improved strength, functional mobility, ROM, posture, and endurance.  (Met)       Start:  06/30/25    Expected End:  07/28/25    Resolved:  07/22/25         Patient will demonstrate 100% understanding of lymphedema risk reduction practices to include self monitoring for lymphedema.  (Progressing)       Start:  06/30/25    Expected End:  07/28/25            Pt will increase AROM/PROM in shoulder abduction ROM to >/=150 degrees on left to improve functional reach, carry, push, pull pain free.  (Met)       Start:  06/30/25    Expected End:  07/28/25    Resolved:  07/22/25         Pt will increase AROM/PROM in shoulder flexion to >/=170 degrees on left to improve functional reach, carry, push, pull pain free.   (Met)       Start:  06/30/25    Expected End:  07/28/25    Resolved:  07/22/25         pt will increase L shoulder ext to 70 degrees for improved functional reach (Progressing)       Start:  06/30/25    Expected End:  07/28/25                Mitzi Vasquez, PT

## 2025-07-23 ENCOUNTER — HOSPITAL ENCOUNTER (OUTPATIENT)
Dept: RADIATION THERAPY | Facility: HOSPITAL | Age: 45
Discharge: HOME | End: 2025-07-23
Payer: COMMERCIAL

## 2025-07-23 DIAGNOSIS — C50.812 MALIGNANT NEOPLASM OF OVERLAPPING SITES OF LEFT BREAST IN FEMALE, ESTROGEN RECEPTOR POSITIVE: Primary | ICD-10-CM

## 2025-07-23 DIAGNOSIS — Z17.0 MALIGNANT NEOPLASM OF OVERLAPPING SITES OF LEFT BREAST IN FEMALE, ESTROGEN RECEPTOR POSITIVE: Primary | ICD-10-CM

## 2025-07-23 LAB
B-HCG UR QL: NEGATIVE
CTP QC/QA: YES

## 2025-07-23 PROCEDURE — 81025 URINE PREGNANCY TEST: CPT | Performed by: RADIOLOGY

## 2025-07-29 ENCOUNTER — CLINICAL SUPPORT (OUTPATIENT)
Dept: REHABILITATION | Facility: HOSPITAL | Age: 45
End: 2025-07-29
Payer: COMMERCIAL

## 2025-07-29 DIAGNOSIS — Z91.89 AT RISK FOR LYMPHEDEMA: ICD-10-CM

## 2025-07-29 DIAGNOSIS — Z74.09 IMPAIRED FUNCTIONAL MOBILITY AND ACTIVITY TOLERANCE: ICD-10-CM

## 2025-07-29 DIAGNOSIS — M25.612 DECREASED ROM OF LEFT SHOULDER: Primary | ICD-10-CM

## 2025-07-29 PROCEDURE — 97112 NEUROMUSCULAR REEDUCATION: CPT

## 2025-07-29 PROCEDURE — 97110 THERAPEUTIC EXERCISES: CPT

## 2025-08-01 ENCOUNTER — HOSPITAL ENCOUNTER (OUTPATIENT)
Dept: RADIATION THERAPY | Facility: HOSPITAL | Age: 45
Discharge: HOME | End: 2025-08-01
Attending: RADIOLOGY
Payer: COMMERCIAL

## 2025-08-01 PROCEDURE — 77300 RADIATION THERAPY DOSE PLAN: CPT | Mod: 26,,, | Performed by: RADIOLOGY

## 2025-08-01 PROCEDURE — 77334 RADIATION TREATMENT AID(S): CPT | Mod: 26,,, | Performed by: RADIOLOGY

## 2025-08-01 PROCEDURE — 77295 3-D RADIOTHERAPY PLAN: CPT | Mod: TC | Performed by: RADIOLOGY

## 2025-08-01 PROCEDURE — 77300 RADIATION THERAPY DOSE PLAN: CPT | Mod: TC | Performed by: RADIOLOGY

## 2025-08-01 PROCEDURE — 77295 3-D RADIOTHERAPY PLAN: CPT | Mod: 26,,, | Performed by: RADIOLOGY

## 2025-08-01 PROCEDURE — 77334 RADIATION TREATMENT AID(S): CPT | Mod: TC | Performed by: RADIOLOGY

## 2025-08-03 NOTE — PROGRESS NOTES
Outpatient Rehab    Physical Therapy Progress Note    Patient Name: Lauren Schmid  MRN: [id number]  YOB: 1980  Encounter Date: 7/29/2025    Therapy Diagnosis:   Encounter Diagnoses   Name Primary?    Decreased ROM of left shoulder Yes    Impaired functional mobility and activity tolerance     At risk for lymphedema      Physician: Miriam aTn PA-C    Physician Orders: Eval and Treat  Medical Diagnosis: Malignant neoplasm of overlapping sites of left breast in female, estrogen receptor positive  Surgical Diagnosis: Not applicable for this Episode   Surgical Date: Not applicable for this Episode  Days Since Last Surgery: Not applicable for this Episode    Visit # / Visits Authorized:  4 / 20  Insurance Authorization Period: 6/27/2025 to 12/31/2025  Date of Evaluation: 6/30/2025  Plan of Care Certification:       PT/PTA:     Number of PTA visits since last PT visit:   Time In: 1115   Time Out: 1150  Total Time (in minutes): 35   Total Billable Time (in minutes): 35    FOTO:  Intake Score (%): 52  Survey Score 2 (%): 75  Survey Score 3 (%): 83    Precautions:  Additional Precautions and Protocol Details: Standard, Cancer. Limiting lifting > 10#    Subjective   She is tired this morning, did a long bike ride and walk yesterday evening. Fatigue 1/10.  Pain reported as 0/10.      Objective      Shoulder Range of Motion  Right Shoulder   Active (deg) Passive (deg) Pain   Flexion 180       Extension 80       ABduction 180       External Rotation (Shoulder ABducted 90 degrees) 100       Internal Rotation (Shoulder ABducted 90 degrees) 95         Left Shoulder   Active (deg) Passive (deg) Pain   Flexion 180       Extension 77       ABduction 180       External Rotation (Shoulder ABducted 90 degrees) 95       Internal Rotation (Shoulder ABducted 90 degrees) 95           Shoulder Strength - Planes of Motion   Right Strength Right Pain Left Strength Left  Pain   Flexion 5   5     Extension 5   5      ABduction 5   5     Internal Rotation 0° 5   5     External Rotation 0° 5   5         Elbow Strength   Right Strength Right Pain Left Strength Left  Pain   Flexion (C6) 5   5     Extension (C7) 5   5            Wrist Strength - Planes of Motion   Right Strength Right Pain Left Strength Left  Pain   Flexion 5   5     Extension 5   5                 Treatment:  Therapeutic Exercise  TE 1: Shoulder pulleys, flex/scap 2 mintues each  TE 2: ROM and MMT  TE 3: bridging with legs in physioball, 1 x 10  TE 4: Seated marching on physioball, 1 x 10 each LE  TE 5: LAQs seated on physioball 1x 10  Balance/Neuromuscular Re-Education  NMR 1: diagonals seated on physioball, red 1 x 5 each  NMR 2: B shoulder ER seated on physioball red band1 x 10  NMR 3: Horizontal abd seated on physioball, red band, 1 x 10      Time Entry(in minutes):  Neuromuscular Re-Education Time Entry: 15  Therapeutic Exercise Time Entry: 20    Assessment & Plan   Assessment: She demonstrated AROM of both shoulder WNL and pain free. Her B UE strength is 5/5 with no complaints of pain. She performed all of today's exercises with no increase in symptoms prior to leaving the clinic. She would benefit from continued physical therapy as needed while undergoing radiation treatments in order to maintain her ROM.   Evaluation/Treatment Tolerance: Patient tolerated treatment well    The patient will continue to benefit from skilled outpatient physical therapy in order to address the deficits listed in the problem list on the initial evaluation, provide patient and family education, and maximize the patients level of independence in the home and community environments.     The patient's spiritual, cultural, and educational needs were considered, and the patient is agreeable to the plan of care and goals.           Plan: Continue with the plan of care and progress as tolerated.    Goals:   Active       Long Term Goals       Pt will increase AROM/PROM in shoulder  abduction ROM to >/=170 degrees on left to improve functional reach, carry, push, pull pain free.  (Met)       Start:  06/30/25    Expected End:  08/25/25    Resolved:  07/22/25         Pt will be I with updated HEP for improved functional mobility, posture, strength, and endurance. (Progressing)       Start:  06/30/25    Expected End:  08/25/25            Pt will increase AROM/PROM in shoulder abduction ROM to >/=170 degrees on left to improve functional reach, carry, push, pull pain free.  (Met)       Start:  06/30/25    Expected End:  08/25/25    Resolved:  07/22/25         Pt will demonstrate full/maximized tissue mobility to increase ROM and promote healthy tissue to be pain free at discharge.  (Met)       Start:  06/30/25    Expected End:  08/25/25    Resolved:  08/03/25            Short Term Goals       Pt will tolerate HEP for improved strength, functional mobility, ROM, posture, and endurance.  (Met)       Start:  06/30/25    Expected End:  07/28/25    Resolved:  07/22/25         Patient will demonstrate 100% understanding of lymphedema risk reduction practices to include self monitoring for lymphedema.  (Progressing)       Start:  06/30/25    Expected End:  07/28/25            Pt will increase AROM/PROM in shoulder abduction ROM to >/=150 degrees on left to improve functional reach, carry, push, pull pain free.  (Met)       Start:  06/30/25    Expected End:  07/28/25    Resolved:  07/22/25         Pt will increase AROM/PROM in shoulder flexion to >/=170 degrees on left to improve functional reach, carry, push, pull pain free.   (Met)       Start:  06/30/25    Expected End:  07/28/25    Resolved:  07/22/25         pt will increase L shoulder ext to 70 degrees for improved functional reach (Met)       Start:  06/30/25    Expected End:  07/28/25    Resolved:  08/03/25             Mitzi Vasquez, PT

## 2025-08-07 ENCOUNTER — PATIENT MESSAGE (OUTPATIENT)
Dept: SURGERY | Facility: CLINIC | Age: 45
End: 2025-08-07
Payer: COMMERCIAL

## 2025-08-07 ENCOUNTER — OFFICE VISIT (OUTPATIENT)
Dept: OTOLARYNGOLOGY | Facility: CLINIC | Age: 45
End: 2025-08-07
Payer: COMMERCIAL

## 2025-08-07 VITALS — HEART RATE: 75 BPM | SYSTOLIC BLOOD PRESSURE: 125 MMHG | DIASTOLIC BLOOD PRESSURE: 88 MMHG

## 2025-08-07 DIAGNOSIS — J35.1 TONSILLAR HYPERTROPHY, UNILATERAL: Primary | ICD-10-CM

## 2025-08-07 PROCEDURE — 3044F HG A1C LEVEL LT 7.0%: CPT | Mod: CPTII,S$GLB,, | Performed by: PHYSICIAN ASSISTANT

## 2025-08-07 PROCEDURE — 1159F MED LIST DOCD IN RCRD: CPT | Mod: CPTII,S$GLB,, | Performed by: PHYSICIAN ASSISTANT

## 2025-08-07 PROCEDURE — 3074F SYST BP LT 130 MM HG: CPT | Mod: CPTII,S$GLB,, | Performed by: PHYSICIAN ASSISTANT

## 2025-08-07 PROCEDURE — 3079F DIAST BP 80-89 MM HG: CPT | Mod: CPTII,S$GLB,, | Performed by: PHYSICIAN ASSISTANT

## 2025-08-07 PROCEDURE — 99203 OFFICE O/P NEW LOW 30 MIN: CPT | Mod: S$GLB,,, | Performed by: PHYSICIAN ASSISTANT

## 2025-08-07 PROCEDURE — 99999 PR PBB SHADOW E&M-EST. PATIENT-LVL III: CPT | Mod: PBBFAC,,, | Performed by: PHYSICIAN ASSISTANT

## 2025-08-07 RX ORDER — METHYLPREDNISOLONE 4 MG/1
TABLET ORAL
Qty: 1 EACH | Refills: 0 | Status: SHIPPED | OUTPATIENT
Start: 2025-08-07

## 2025-08-07 RX ORDER — AMOXICILLIN AND CLAVULANATE POTASSIUM 875; 125 MG/1; MG/1
1 TABLET, FILM COATED ORAL 2 TIMES DAILY
Qty: 20 TABLET | Refills: 0 | Status: SHIPPED | OUTPATIENT
Start: 2025-08-07 | End: 2025-08-17

## 2025-08-07 NOTE — PROGRESS NOTES
Ear, Nose, & Throat  Otolaryngology - Head & Neck Surgery    Summary of Visit:  Lauren Schmid was seen for Enlarged left tonsil/ globus sensation in throat      Subjective:     Chief Complaint:   Chief Complaint   Patient presents with    Enlarged left tonsil/ globus sensation in throat       Lauren Schmid is a 44 y.o. female who presents for enlarged left tonsil noticed about July 23--2 weeks ago. She went to her dentist for regular cleaning July 25th. They recommended follow up in the abscence of any URI in the next few days. It has not increased in size. She reports slight discomfort, mild pain, mainly foreign body sensation. She felt there was improvement, but not total resolution since onset. No prior treatment/remedies. Of note, she was diagnosed with breast cancer a few months ago. She is not on immunosuppressants and has not had radiation yet. She denies recent illness, dysphagia, voice change, shortness of breath, chronic tonsill issues, tonsil stones, fever, ear pain, weight loss, night sweats.     Tobacco: none  Etoh: 2 glasses of wine per night  Head and neck sx: none    Past Medical History  Past Medical History:   Diagnosis Date    Breast cancer     Herpes simplex virus (HSV) infection     Plantar fasciitis of right foot 10/2022       Past Surgical History    Past Surgical History:   Procedure Laterality Date    INJECTION FOR SENTINEL NODE IDENTIFICATION Left 6/12/2025    Procedure: INJECTION, FOR SENTINEL NODE IDENTIFICATION;  Surgeon: Tamar Patterson MD;  Location: [facility];  Service: General;  Laterality: Left;    MASTECTOMY, PARTIAL Left 6/12/2025    Procedure: MASTECTOMY, PARTIAL-left with radiological marker;  Surgeon: Tamar Patterson MD;  Location: [facility] OR;  Service: General;  Laterality: Left;    MOUTH SURGERY N/A 04/07/2025    SENTINEL LYMPH NODE BIOPSY Left 6/12/2025    Procedure: BIOPSY, SENTINEL LYMPH NODE;  Surgeon: Tamar Patterson MD;  Location: [facility];  Service: General;  Laterality:  Left;        Family History  Family History   Problem Relation Name Age of Onset    Breast cancer Neg Hx      Colon cancer Neg Hx      Ovarian cancer Neg Hx      Esophageal cancer Neg Hx         Social History  Social History     Socioeconomic History    Marital status:    Tobacco Use    Smoking status: Never    Smokeless tobacco: Never   Vaping Use    Vaping status: Never Used   Substance and Sexual Activity    Alcohol use: Yes     Alcohol/week: 14.0 standard drinks of alcohol     Types: 14 Glasses of wine per week    Drug use: No    Sexual activity: Yes     Partners: Male     Birth control/protection: OCP     Social Drivers of Health     Financial Resource Strain: High Risk (3/24/2025)    Overall Financial Resource Strain (CARDIA)     Difficulty of Paying Living Expenses: Hard   Food Insecurity: Food Insecurity Present (3/24/2025)    Hunger Vital Sign     Worried About Running Out of Food in the Last Year: Sometimes true     Ran Out of Food in the Last Year: Never true   Transportation Needs: No Transportation Needs (3/24/2025)    PRAPARE - Transportation     Lack of Transportation (Medical): No     Lack of Transportation (Non-Medical): No   Physical Activity: Sufficiently Active (3/24/2025)    Exercise Vital Sign     Days of Exercise per Week: 7 days     Minutes of Exercise per Session: 60 min   Stress: No Stress Concern Present (3/24/2025)    [employer] - Occupational Stress Questionnaire     Feeling of Stress : Only a little   Housing Stability: High Risk (3/24/2025)    Housing Stability Vital Sign     Unable to Pay for Housing in the Last Year: Yes     Number of Times Moved in the Last Year: 0     Homeless in the Last Year: No       Allergies  Codeine    Medications  Current Outpatient Medications   Medication Sig Dispense Refill    omeprazole (PRILOSEC) 40 MG capsule Take 1 capsule (40 mg total) by mouth once daily. 90 capsule 3    hydrOXYzine pamoate (VISTARIL) 25 MG Cap  TAKE 1 CAPSULE (25 MG TOTAL) BY MOUTH DAILY AS NEEDED. (Patient not taking: Reported on 8/7/2025) 90 capsule 1    ondansetron (ZOFRAN-ODT) 8 MG TbDL dissolve 1 tablet (8 mg total) by mouth every 6 (six) hours as needed. (Patient not taking: Reported on 8/7/2025) 10 tablet 0    rizatriptan (MAXALT) 10 MG tablet Take one tablet (10 mg) at the onset of headaches; if symptoms persist or return, may repeat dose after 2 hours. maximum dose: 20 mg per 24 hours 8 tablet 5    tamoxifen (NOLVADEX) 20 MG Tab Take 1 tablet (20 mg total) by mouth once daily Start 1 week after completing radiation. 30 tablet 11     No current facility-administered medications for this visit.       ROS:  Pertinent positive and negative review of systems as noted in HPI.     Objective:     /88 (BP Location: Left arm, Patient Position: Sitting)   Pulse 75      Physical Exam    General Appearance:   Awake, Alert and Oriented. NAD. Appropriate affect and appearance      Neuro:   Spontaneous eye opening, appropriate verbal responses, follows commands  Pupils equal, round & brisk. EOMI, no proptosis  Face is symmetric, non-edematous bilaterally     Head and Face:   skin is intact with no lesions noted.  Parotid and submandibular glands are symmetric and non-tender.      Nose:   External nose is symmetric, no skin lesions  Inferior turbinate hypertrophy, No polyps or rhinorrhea     OC/OP:  Tongue midline on extension, non-edematous, soft  No labial, buccal, oral tongue or floor of mouth lesions  Soft palate symmetric, midline and without lesions or masses, there is mild hypertrophy of left tonsil compared to right. It does not feel particularly firm.  No masses or lesions of the visualized oropharynx     Neck:  Neck is symmetric, non-edematous, non-erythematous  Trachea is midline  No thyromegaly, thyroid nodules or masses, non-tender   No palpable adenopathy or masses in levels I-VI     Respiratory:  Normal work of breathing, no accessory muscle  use, no stridor     Voice:  Normal vocal quality, volume and articulation    Data Review:       Procedures:           Assessment and Plan:     1. Tonsillar hypertrophy, unilateral    Other orders  -     amoxicillin-clavulanate 875-125mg (AUGMENTIN) 875-125 mg per tablet; Take 1 tablet by mouth 2 (two) times daily. for 10 days  Dispense: 20 tablet; Refill: 0  -     methylPREDNISolone (MEDROL DOSEPACK) 4 mg tablet; Use as directed  Dispense: 1 each; Refill: 0      I had a long discussion with the patient regarding her condition and the further workup and management options. Trial of abx and steroids. She will follow up in 1 week to assess for any changes. If no improvement, will consider CT neck. Questions were encouraged and answered. The patient understands to return sooner for any new symptoms/concerns/worsening prior to any scheduled visits.      Problem List Items Addressed This Visit    None

## 2025-08-13 ENCOUNTER — DOCUMENTATION ONLY (OUTPATIENT)
Dept: RADIATION ONCOLOGY | Facility: CLINIC | Age: 45
End: 2025-08-13
Payer: COMMERCIAL

## 2025-08-20 ENCOUNTER — DOCUMENTATION ONLY (OUTPATIENT)
Dept: HEMATOLOGY/ONCOLOGY | Facility: CLINIC | Age: 45
End: 2025-08-20
Payer: COMMERCIAL

## 2025-08-21 ENCOUNTER — OFFICE VISIT (OUTPATIENT)
Dept: OTOLARYNGOLOGY | Facility: CLINIC | Age: 45
End: 2025-08-21
Payer: COMMERCIAL

## 2025-08-21 VITALS — DIASTOLIC BLOOD PRESSURE: 82 MMHG | HEART RATE: 86 BPM | SYSTOLIC BLOOD PRESSURE: 119 MMHG

## 2025-08-21 DIAGNOSIS — R22.1 NECK MASS: ICD-10-CM

## 2025-08-21 DIAGNOSIS — J35.1 TONSILLAR HYPERTROPHY, UNILATERAL: Primary | ICD-10-CM

## 2025-08-21 PROCEDURE — 99999 PR PBB SHADOW E&M-EST. PATIENT-LVL III: CPT | Mod: PBBFAC,,, | Performed by: PHYSICIAN ASSISTANT

## 2025-08-27 ENCOUNTER — DOCUMENTATION ONLY (OUTPATIENT)
Dept: RADIATION ONCOLOGY | Facility: CLINIC | Age: 45
End: 2025-08-27
Payer: COMMERCIAL

## 2025-09-02 ENCOUNTER — HOSPITAL ENCOUNTER (OUTPATIENT)
Dept: RADIATION THERAPY | Facility: HOSPITAL | Age: 45
Discharge: HOME | End: 2025-09-02
Attending: RADIOLOGY
Payer: COMMERCIAL

## 2025-09-03 ENCOUNTER — DOCUMENTATION ONLY (OUTPATIENT)
Dept: RADIATION ONCOLOGY | Facility: CLINIC | Age: 45
End: 2025-09-03
Payer: COMMERCIAL

## 2025-09-03 DIAGNOSIS — L58.9 RADIATION DERMATITIS: Primary | ICD-10-CM

## 2025-09-03 RX ORDER — CLOTRIMAZOLE AND BETAMETHASONE DIPROPIONATE 10; .64 MG/G; MG/G
CREAM TOPICAL 2 TIMES DAILY
Qty: 45 G | Refills: 0 | Status: SHIPPED | OUTPATIENT
Start: 2025-09-03

## 2025-09-05 ENCOUNTER — HOSPITAL ENCOUNTER (OUTPATIENT)
Dept: RADIOLOGY | Facility: HOSPITAL | Age: 45
Discharge: HOME | End: 2025-09-05
Attending: PHYSICIAN ASSISTANT
Payer: COMMERCIAL

## 2025-09-05 DIAGNOSIS — R22.1 NECK MASS: ICD-10-CM

## 2025-09-05 DIAGNOSIS — I77.71 DISSECTION OF CAROTID ARTERY: Primary | ICD-10-CM

## 2025-09-05 DIAGNOSIS — J35.1 TONSILLAR HYPERTROPHY, UNILATERAL: ICD-10-CM

## 2025-09-05 PROCEDURE — 25500020 PHARM REV CODE 255: Performed by: PHYSICIAN ASSISTANT

## 2025-09-05 PROCEDURE — 70491 CT SOFT TISSUE NECK W/DYE: CPT | Mod: TC

## 2025-09-05 PROCEDURE — 70491 CT SOFT TISSUE NECK W/DYE: CPT | Mod: 26,,, | Performed by: RADIOLOGY

## 2025-09-05 RX ADMIN — IOHEXOL 75 ML: 350 INJECTION, SOLUTION INTRAVENOUS at 08:09

## (undated) DEVICE — DRAPE THREE-QTR REINF 53X77IN

## (undated) DEVICE — ELECTRODE BLADE INSULATED 1 IN

## (undated) DEVICE — SUT VICRYL 3-0 27 SH

## (undated) DEVICE — ADHESIVE DERMABOND ADVANCED

## (undated) DEVICE — APPLIER LIGACLIP SM 9.38IN

## (undated) DEVICE — ELECTRODE REM PLYHSV RETURN 9

## (undated) DEVICE — BRA CLASSIC COMFORT BLK SZ 34

## (undated) DEVICE — GLOVE BIOGEL SKINSENSE PI 6.5

## (undated) DEVICE — TOWEL OR DISP STRL BLUE 4/PK

## (undated) DEVICE — COVER PROBE SHEATH SURG 6X3IN

## (undated) DEVICE — SOL IRRI STRL WATER 1000ML

## (undated) DEVICE — MARGIN MARKER STANDARD 6 COLOR

## (undated) DEVICE — GLOVE BIOGEL SKINSENSE PI 6.0

## (undated) DEVICE — CONTAINER SPEC OR STRL 4.5OZ

## (undated) DEVICE — SUT 2/0 30IN SILK BLK BRAI

## (undated) DEVICE — Device

## (undated) DEVICE — APPLICATOR CHLORAPREP ORN 26ML

## (undated) DEVICE — DRAPE STERI INSTRUMENT 1018

## (undated) DEVICE — SUT VICRYL 4-0 ANTIBACT

## (undated) DEVICE — UNDERGLOVES BIOGEL PI SZ 7 LF

## (undated) DEVICE — NDL HYPO REG 25G X 1 1/2

## (undated) DEVICE — UNDERGLOVE BIOGEL PI SZ 6.5 LF

## (undated) DEVICE — GOWN ORBIS LVL 4 XXL 49IN

## (undated) DEVICE — SPONGE BULKEE II ABSRB 6X6.75

## (undated) DEVICE — SHEATH GUIDE SCOUT SURG RADAR

## (undated) DEVICE — SYR 10CC LUER LOCK

## (undated) DEVICE — COVER PROBE US 5.5X58L NON LTX